# Patient Record
Sex: FEMALE | Race: WHITE | NOT HISPANIC OR LATINO | Employment: OTHER | URBAN - METROPOLITAN AREA
[De-identification: names, ages, dates, MRNs, and addresses within clinical notes are randomized per-mention and may not be internally consistent; named-entity substitution may affect disease eponyms.]

---

## 2017-01-27 ENCOUNTER — RESULTS ENCOUNTER (OUTPATIENT)
Dept: INTERNAL MEDICINE | Facility: CLINIC | Age: 62
End: 2017-01-27

## 2017-01-27 DIAGNOSIS — E78.5 HYPERLIPIDEMIA: ICD-10-CM

## 2017-01-27 DIAGNOSIS — E55.9 VITAMIN D DEFICIENCY: ICD-10-CM

## 2017-01-30 ENCOUNTER — LAB (OUTPATIENT)
Dept: INTERNAL MEDICINE | Facility: CLINIC | Age: 62
End: 2017-01-30

## 2017-01-30 ENCOUNTER — RESULTS ENCOUNTER (OUTPATIENT)
Dept: INTERNAL MEDICINE | Facility: CLINIC | Age: 62
End: 2017-01-30

## 2017-01-30 DIAGNOSIS — E78.5 HYPERLIPIDEMIA, UNSPECIFIED HYPERLIPIDEMIA TYPE: Primary | ICD-10-CM

## 2017-01-30 DIAGNOSIS — E55.9 VITAMIN D DEFICIENCY: ICD-10-CM

## 2017-01-30 LAB
25(OH)D3 SERPL-MCNC: 29.5 NG/ML (ref 30–100)
ALBUMIN SERPL-MCNC: 3.86 G/DL (ref 3.4–4.6)
ALBUMIN/GLOB SERPL: 1.2 G/DL
ALP SERPL-CCNC: 62 U/L (ref 46–116)
ALT SERPL W P-5'-P-CCNC: 18 U/L (ref 14–59)
ANION GAP SERPL CALCULATED.3IONS-SCNC: 9 MMOL/L
AST SERPL-CCNC: 17 U/L (ref 7–37)
BILIRUB SERPL-MCNC: 0.5 MG/DL (ref 0.2–1)
BUN BLD-MCNC: 14 MG/DL (ref 6–22)
BUN/CREAT SERPL: 16.5 (ref 7–25)
CALCIUM SPEC-SCNC: 8.8 MG/DL (ref 8.6–10.5)
CHLORIDE SERPL-SCNC: 104 MMOL/L (ref 95–107)
CHOLEST SERPL-MCNC: 223 MG/DL (ref 0–200)
CO2 SERPL-SCNC: 27 MMOL/L (ref 23–32)
CREAT BLD-MCNC: 0.85 MG/DL (ref 0.55–1.02)
GFR SERPL CREATININE-BSD FRML MDRD: 68 ML/MIN/1.73
GLOBULIN UR ELPH-MCNC: 3.3 GM/DL
GLUCOSE BLD-MCNC: 98 MG/DL (ref 70–100)
HDLC SERPL-MCNC: 53 MG/DL (ref 40–81)
LDLC SERPL CALC-MCNC: 145 MG/DL (ref 0–100)
LDLC/HDLC SERPL: 2.74 {RATIO}
POTASSIUM BLD-SCNC: 4 MMOL/L (ref 3.3–5.3)
PROT SERPL-MCNC: 7.2 G/DL (ref 6.3–8.4)
SODIUM BLD-SCNC: 140 MMOL/L (ref 136–145)
TRIGL SERPL-MCNC: 124 MG/DL (ref 0–150)
VLDLC SERPL-MCNC: 24.8 MG/DL

## 2017-01-30 PROCEDURE — 80061 LIPID PANEL: CPT | Performed by: INTERNAL MEDICINE

## 2017-01-30 PROCEDURE — 80053 COMPREHEN METABOLIC PANEL: CPT | Performed by: INTERNAL MEDICINE

## 2017-02-01 ENCOUNTER — OFFICE VISIT (OUTPATIENT)
Dept: INTERNAL MEDICINE | Facility: CLINIC | Age: 62
End: 2017-02-01

## 2017-02-01 VITALS
DIASTOLIC BLOOD PRESSURE: 60 MMHG | BODY MASS INDEX: 25.61 KG/M2 | HEIGHT: 64 IN | WEIGHT: 150 LBS | SYSTOLIC BLOOD PRESSURE: 104 MMHG

## 2017-02-01 DIAGNOSIS — E78.2 MIXED HYPERLIPIDEMIA: Primary | ICD-10-CM

## 2017-02-01 DIAGNOSIS — E55.9 VITAMIN D DEFICIENCY: ICD-10-CM

## 2017-02-01 DIAGNOSIS — Z00.00 HEALTH CARE MAINTENANCE: ICD-10-CM

## 2017-02-01 PROCEDURE — 99213 OFFICE O/P EST LOW 20 MIN: CPT | Performed by: INTERNAL MEDICINE

## 2017-02-01 NOTE — PROGRESS NOTES
"Subjective   Chris Moreno is a 61 y.o. female. Here for hyperlipidemia and Vitamin D deficiency f/u.    History of Present Illness     Visit Vitals   • /60   • Ht 64\" (162.6 cm)   • Wt 150 lb (68 kg)   • BMI 25.75 kg/m2       Current Outpatient Prescriptions:   •  Cholecalciferol (VITAMIN D) 2000 UNITS capsule, Take 2,000 Units by mouth daily., Disp: , Rfl:   •  fluticasone (FLONASE) 50 MCG/ACT nasal spray, 2 sprays into each nostril daily., Disp: 16 g, Rfl: 6  •  ketoconazole (NIZORAL) 2 % cream, Apply topically daily., Disp: , Rfl:   •  Loratadine (CLARITIN PO), Take  by mouth Daily., Disp: , Rfl:   •  Multiple Vitamins-Minerals (CENTRUM SILVER) tablet, Take 1 tablet by mouth daily., Disp: , Rfl:   •  olopatadine (PATADAY) 0.2 % solution ophthalmic solution, Apply to eye as needed., Disp: , Rfl:   •  raloxifene (EVISTA) 60 MG tablet, TAKE 1 TABLET DAILY, Disp: 90 tablet, Rfl: 1  •  rosuvastatin (CRESTOR) 20 MG tablet, Take 1 tablet by mouth daily., Disp: 90 tablet, Rfl: 3    Patient has been diagnosed with hyperlipidemia. Target LDL is < 100 mg/dl (CHD or \"CHD risk equivalent\" is present). Patient is on low fat diet with good compliance. Patient is compliant with treatment: daily use of Crestor (rozuvastatin). Side effects of medication: none. Exercise 3-4 days a week, walking and aerobic exercise.    Patient had been diagnosed with Vitamin D deficiency. Takes over the counter Vit D3 2000 IU a day. Patient admits that her compliance is not good, on average she takes supplement 2 days a week only.Patient does not have complaints of muscle or bone aches. Balance is good. No recent falls.     The following portions of the patient's history were reviewed and updated as appropriate: allergies, current medications, past family history, past medical history, past social history, past surgical history and problem list.    Review of Systems   Constitutional: Negative for chills and fever.   Eyes: Negative for pain " "and redness.   Respiratory: Negative for cough and shortness of breath.    Cardiovascular: Negative for chest pain and leg swelling.   Neurological: Negative for dizziness and headaches.       Objective   Physical Exam   Constitutional: She is oriented to person, place, and time. She appears well-developed and well-nourished.   HENT:   Head: Normocephalic and atraumatic.   Right Ear: Tympanic membrane, external ear and ear canal normal.   Left Ear: Tympanic membrane, external ear and ear canal normal.   Nose: Nose normal. Right sinus exhibits no maxillary sinus tenderness and no frontal sinus tenderness. Left sinus exhibits no maxillary sinus tenderness and no frontal sinus tenderness.   Mouth/Throat: Uvula is midline, oropharynx is clear and moist and mucous membranes are normal.   Eyes: Conjunctivae and EOM are normal. Pupils are equal, round, and reactive to light. Right eye exhibits no discharge. Left eye exhibits no discharge. No scleral icterus.   Neck: Neck supple. No JVD present.   Cardiovascular: Normal rate, regular rhythm and normal heart sounds.  Exam reveals no gallop and no friction rub.    No murmur heard.  Pulmonary/Chest: Effort normal and breath sounds normal. She has no wheezes. She has no rales.   Musculoskeletal: She exhibits no edema.   Lymphadenopathy:     She has no cervical adenopathy.   Neurological: She is alert and oriented to person, place, and time. No cranial nerve deficit.   Skin: Skin is warm and dry. No rash noted.   Psychiatric: She has a normal mood and affect. Her behavior is normal.   Vitals reviewed.      Assessment/Plan   Problems Addressed this Visit     Hyperlipidemia - Primary    Vitamin D deficiency      1. Hyperlipidemia - control had been a bit worse since the hol;iadays. Needs to improve the diet and continue medication and exercise. Hopefully her \"invisilines\" will help to reduce snacking.  2. Vit D deficiency - poor compliance with medication and the level is on a low " side: asked patient to take Vit D at the same time a Crestor, but daily.

## 2017-04-06 RX ORDER — RALOXIFENE HYDROCHLORIDE 60 MG/1
TABLET, FILM COATED ORAL
Qty: 90 TABLET | Refills: 0 | Status: SHIPPED | OUTPATIENT
Start: 2017-04-06 | End: 2017-07-05 | Stop reason: SDUPTHER

## 2017-05-01 ENCOUNTER — OFFICE VISIT (OUTPATIENT)
Dept: OBSTETRICS AND GYNECOLOGY | Facility: CLINIC | Age: 62
End: 2017-05-01

## 2017-05-01 VITALS
HEIGHT: 64 IN | SYSTOLIC BLOOD PRESSURE: 110 MMHG | WEIGHT: 153 LBS | BODY MASS INDEX: 26.12 KG/M2 | DIASTOLIC BLOOD PRESSURE: 70 MMHG

## 2017-05-01 DIAGNOSIS — Z85.3 HX: BREAST CANCER: ICD-10-CM

## 2017-05-01 DIAGNOSIS — M85.80 OSTEOPENIA: ICD-10-CM

## 2017-05-01 DIAGNOSIS — Z01.419 ENCOUNTER FOR GYNECOLOGICAL EXAMINATION WITHOUT ABNORMAL FINDING: Primary | ICD-10-CM

## 2017-05-01 PROCEDURE — 99396 PREV VISIT EST AGE 40-64: CPT | Performed by: OBSTETRICS & GYNECOLOGY

## 2017-05-03 LAB
CONV .: NORMAL
CYTOLOGIST CVX/VAG CYTO: NORMAL
CYTOLOGY CVX/VAG DOC THIN PREP: NORMAL
DX ICD CODE: NORMAL
HIV 1 & 2 AB SER-IMP: NORMAL
OTHER STN SPEC: NORMAL
PATH REPORT.FINAL DX SPEC: NORMAL
STAT OF ADQ CVX/VAG CYTO-IMP: NORMAL

## 2017-07-05 DIAGNOSIS — Z12.31 SCREENING MAMMOGRAM, ENCOUNTER FOR: ICD-10-CM

## 2017-07-05 RX ORDER — RALOXIFENE HYDROCHLORIDE 60 MG/1
TABLET, FILM COATED ORAL
Qty: 90 TABLET | Refills: 1 | Status: SHIPPED | OUTPATIENT
Start: 2017-07-05 | End: 2017-08-16 | Stop reason: SDUPTHER

## 2017-08-08 ENCOUNTER — LAB (OUTPATIENT)
Dept: LAB | Facility: HOSPITAL | Age: 62
End: 2017-08-08

## 2017-08-08 DIAGNOSIS — E55.9 VITAMIN D DEFICIENCY: ICD-10-CM

## 2017-08-08 DIAGNOSIS — Z00.00 HEALTH CARE MAINTENANCE: ICD-10-CM

## 2017-08-08 DIAGNOSIS — Z00.00 HEALTH MAINTENANCE EXAMINATION: Primary | ICD-10-CM

## 2017-08-08 LAB
25(OH)D3 SERPL-MCNC: 33.6 NG/ML (ref 30–100)
ALBUMIN SERPL-MCNC: 4.2 G/DL (ref 3.5–5.2)
ALBUMIN/GLOB SERPL: 1.3 G/DL
ALP SERPL-CCNC: 58 U/L (ref 39–117)
ALT SERPL W P-5'-P-CCNC: 16 U/L (ref 1–33)
ANION GAP SERPL CALCULATED.3IONS-SCNC: 15.2 MMOL/L
AST SERPL-CCNC: 18 U/L (ref 1–32)
BACTERIA UR QL AUTO: NORMAL /HPF
BASOPHILS # BLD AUTO: 0.04 10*3/MM3 (ref 0–0.2)
BASOPHILS NFR BLD AUTO: 0.7 % (ref 0–1.5)
BILIRUB SERPL-MCNC: 0.6 MG/DL (ref 0.1–1.2)
BILIRUB UR QL STRIP: NEGATIVE
BUN BLD-MCNC: 16 MG/DL (ref 8–23)
BUN/CREAT SERPL: 17.2 (ref 7–25)
CALCIUM SPEC-SCNC: 9.6 MG/DL (ref 8.6–10.5)
CHLORIDE SERPL-SCNC: 104 MMOL/L (ref 98–107)
CHOLEST SERPL-MCNC: 235 MG/DL (ref 0–200)
CLARITY UR: CLEAR
CO2 SERPL-SCNC: 21.8 MMOL/L (ref 22–29)
COLOR UR: YELLOW
CREAT BLD-MCNC: 0.93 MG/DL (ref 0.57–1)
DEPRECATED RDW RBC AUTO: 43.3 FL (ref 37–54)
EOSINOPHIL # BLD AUTO: 0.15 10*3/MM3 (ref 0–0.7)
EOSINOPHIL NFR BLD AUTO: 2.5 % (ref 0.3–6.2)
ERYTHROCYTE [DISTWIDTH] IN BLOOD BY AUTOMATED COUNT: 12.9 % (ref 11.7–13)
GFR SERPL CREATININE-BSD FRML MDRD: 61 ML/MIN/1.73
GLOBULIN UR ELPH-MCNC: 3.3 GM/DL
GLUCOSE BLD-MCNC: 98 MG/DL (ref 65–99)
GLUCOSE UR STRIP-MCNC: NEGATIVE MG/DL
HCT VFR BLD AUTO: 40 % (ref 35.6–45.5)
HDLC SERPL-MCNC: 48 MG/DL (ref 40–60)
HGB BLD-MCNC: 13.5 G/DL (ref 11.9–15.5)
HGB UR QL STRIP.AUTO: NEGATIVE
HYALINE CASTS UR QL AUTO: NORMAL /LPF
IMM GRANULOCYTES # BLD: 0 10*3/MM3 (ref 0–0.03)
IMM GRANULOCYTES NFR BLD: 0 % (ref 0–0.5)
KETONES UR QL STRIP: NEGATIVE
LDLC SERPL CALC-MCNC: 150 MG/DL (ref 0–100)
LDLC/HDLC SERPL: 3.12 {RATIO}
LEUKOCYTE ESTERASE UR QL STRIP.AUTO: NEGATIVE
LYMPHOCYTES # BLD AUTO: 2.6 10*3/MM3 (ref 0.9–4.8)
LYMPHOCYTES NFR BLD AUTO: 44 % (ref 19.6–45.3)
MCH RBC QN AUTO: 31.5 PG (ref 26.9–32)
MCHC RBC AUTO-ENTMCNC: 33.8 G/DL (ref 32.4–36.3)
MCV RBC AUTO: 93.5 FL (ref 80.5–98.2)
MONOCYTES # BLD AUTO: 0.35 10*3/MM3 (ref 0.2–1.2)
MONOCYTES NFR BLD AUTO: 5.9 % (ref 5–12)
NEUTROPHILS # BLD AUTO: 2.77 10*3/MM3 (ref 1.9–8.1)
NEUTROPHILS NFR BLD AUTO: 46.9 % (ref 42.7–76)
NITRITE UR QL STRIP: NEGATIVE
PH UR STRIP.AUTO: <=5 [PH] (ref 5–8)
PLATELET # BLD AUTO: 231 10*3/MM3 (ref 140–500)
PMV BLD AUTO: 11.6 FL (ref 6–12)
POTASSIUM BLD-SCNC: 4.1 MMOL/L (ref 3.5–5.2)
PROT SERPL-MCNC: 7.5 G/DL (ref 6–8.5)
PROT UR QL STRIP: NEGATIVE
RBC # BLD AUTO: 4.28 10*6/MM3 (ref 3.9–5.2)
RBC # UR: NORMAL /HPF
REF LAB TEST METHOD: NORMAL
SODIUM BLD-SCNC: 141 MMOL/L (ref 136–145)
SP GR UR STRIP: 1.02 (ref 1–1.03)
SQUAMOUS #/AREA URNS HPF: NORMAL /HPF
TRIGL SERPL-MCNC: 186 MG/DL (ref 0–150)
TSH SERPL DL<=0.05 MIU/L-ACNC: 3.02 MIU/ML (ref 0.27–4.2)
UROBILINOGEN UR QL STRIP: NORMAL
VLDLC SERPL-MCNC: 37.2 MG/DL (ref 5–40)
WBC NRBC COR # BLD: 5.91 10*3/MM3 (ref 4.5–10.7)
WBC UR QL AUTO: NORMAL /HPF

## 2017-08-08 PROCEDURE — 82306 VITAMIN D 25 HYDROXY: CPT

## 2017-08-08 PROCEDURE — 81001 URINALYSIS AUTO W/SCOPE: CPT

## 2017-08-08 PROCEDURE — 80053 COMPREHEN METABOLIC PANEL: CPT

## 2017-08-08 PROCEDURE — 36415 COLL VENOUS BLD VENIPUNCTURE: CPT

## 2017-08-08 PROCEDURE — 84443 ASSAY THYROID STIM HORMONE: CPT

## 2017-08-08 PROCEDURE — 80061 LIPID PANEL: CPT

## 2017-08-08 PROCEDURE — 85025 COMPLETE CBC W/AUTO DIFF WBC: CPT

## 2017-08-16 ENCOUNTER — OFFICE VISIT (OUTPATIENT)
Dept: INTERNAL MEDICINE | Facility: CLINIC | Age: 62
End: 2017-08-16

## 2017-08-16 VITALS
WEIGHT: 153 LBS | HEIGHT: 64 IN | BODY MASS INDEX: 26.12 KG/M2 | DIASTOLIC BLOOD PRESSURE: 62 MMHG | SYSTOLIC BLOOD PRESSURE: 104 MMHG

## 2017-08-16 DIAGNOSIS — E55.9 VITAMIN D DEFICIENCY: ICD-10-CM

## 2017-08-16 DIAGNOSIS — Z78.0 POST-MENOPAUSE: ICD-10-CM

## 2017-08-16 DIAGNOSIS — E78.2 MIXED HYPERLIPIDEMIA: ICD-10-CM

## 2017-08-16 DIAGNOSIS — M85.80 OSTEOPENIA: ICD-10-CM

## 2017-08-16 DIAGNOSIS — J30.9 ATOPIC RHINITIS: ICD-10-CM

## 2017-08-16 DIAGNOSIS — Z00.00 HEALTH CARE MAINTENANCE: Primary | ICD-10-CM

## 2017-08-16 PROCEDURE — 99396 PREV VISIT EST AGE 40-64: CPT | Performed by: INTERNAL MEDICINE

## 2017-08-16 RX ORDER — OLOPATADINE HYDROCHLORIDE 2 MG/ML
1 SOLUTION/ DROPS OPHTHALMIC DAILY
Qty: 2.5 ML | Refills: 11 | Status: SHIPPED | OUTPATIENT
Start: 2017-08-16 | End: 2018-11-02 | Stop reason: SDUPTHER

## 2017-08-16 RX ORDER — FLUTICASONE PROPIONATE 50 MCG
2 SPRAY, SUSPENSION (ML) NASAL DAILY
Qty: 16 G | Refills: 11 | Status: SHIPPED | OUTPATIENT
Start: 2017-08-16 | End: 2018-11-02 | Stop reason: SDUPTHER

## 2017-08-16 RX ORDER — RALOXIFENE HYDROCHLORIDE 60 MG/1
60 TABLET, FILM COATED ORAL DAILY
Qty: 90 TABLET | Refills: 3 | Status: SHIPPED | OUTPATIENT
Start: 2017-08-16 | End: 2018-08-24 | Stop reason: SDUPTHER

## 2017-08-16 RX ORDER — ROSUVASTATIN CALCIUM 20 MG/1
20 TABLET, COATED ORAL DAILY
Qty: 90 TABLET | Refills: 3 | Status: SHIPPED | OUTPATIENT
Start: 2017-08-16 | End: 2018-08-20 | Stop reason: SDUPTHER

## 2017-08-16 NOTE — PROGRESS NOTES
"Ish Moreno is a 61 y.o. female.     History of Present Illness   /62  Ht 64\" (162.6 cm)  Wt 153 lb (69.4 kg)  BMI 26.26 kg/m2  Patient is here for yearly CPE. Last CPE was  1 year ago.  PMH, SH and FH reviewed. Changes in the FH: none .  Patient rates her own health as: good.  Tobacco use: none.  Alcohol use: seldom.  Recreational drugs use: none  Medication list rewieved.  Diet: healthy heart.  Exercise: 2 times a week and aerobic exercise.  Marital status: .   Employment: retired.  Patient rates her stress level as: low.  Dental health: good. Brushes teeth 7 times a day, flosses 1 time a day . Dental visits: 2 times a year .  Vision correction: not needed  Hearing: normal.    Recent vaccinations:   Flu discussed and recommended, but patient declines  Tdap  is up to date  Zostavax completed    Patient is postmenopausal. Past pregnancies:none. Currently patient is not a candidate for HRT.      Current Outpatient Prescriptions:   •  Cholecalciferol (VITAMIN D) 2000 UNITS capsule, Take 2,000 Units by mouth daily., Disp: , Rfl:   •  fluticasone (FLONASE) 50 MCG/ACT nasal spray, 2 sprays into each nostril daily., Disp: 16 g, Rfl: 6  •  Loratadine (CLARITIN PO), Take  by mouth Daily., Disp: , Rfl:   •  Multiple Vitamins-Minerals (CENTRUM SILVER) tablet, Take 1 tablet by mouth daily., Disp: , Rfl:   •  olopatadine (PATADAY) 0.2 % solution ophthalmic solution, Apply to eye as needed., Disp: , Rfl:   •  raloxifene (EVISTA) 60 MG tablet, TAKE 1 TABLET DAILY, Disp: 90 tablet, Rfl: 1  •  rosuvastatin (CRESTOR) 20 MG tablet, Take 1 tablet by mouth daily., Disp: 90 tablet, Rfl: 3          The following portions of the patient's history were reviewed and updated as appropriate: allergies, current medications, past family history, past medical history, past social history, past surgical history and problem list.    Review of Systems   Constitutional: Negative for chills and fever.   HENT: Negative " for postnasal drip, sinus pressure and sore throat.    Eyes: Negative for pain and itching. Eye discharge: watery eyes from allergies.   Respiratory: Negative for cough and chest tightness.    Cardiovascular: Negative for chest pain and leg swelling.   Gastrointestinal: Negative for abdominal pain and blood in stool.   Endocrine: Negative for cold intolerance and heat intolerance.   Genitourinary: Negative for difficulty urinating and flank pain.   Musculoskeletal: Negative for back pain and neck pain.   Skin: Negative for color change and rash.   Neurological: Negative for dizziness, weakness and headaches.   Hematological: Negative for adenopathy. Does not bruise/bleed easily.   Psychiatric/Behavioral: Negative for sleep disturbance. The patient is not nervous/anxious.        Objective   Physical Exam   Constitutional: She is oriented to person, place, and time. She appears well-developed and well-nourished. No distress.   HENT:   Head: Normocephalic and atraumatic.   Right Ear: External ear normal.   Left Ear: External ear normal.   Nose: Nose normal.   Mouth/Throat: Oropharynx is clear and moist. No oropharyngeal exudate.   Eyes: Conjunctivae and EOM are normal. Pupils are equal, round, and reactive to light. Right eye exhibits no discharge. Left eye exhibits no discharge. No scleral icterus.   Neck: Normal range of motion. Neck supple. No JVD present. No thyromegaly present.   Cardiovascular: Normal rate, regular rhythm, S1 normal, S2 normal, normal heart sounds and intact distal pulses.  Exam reveals no gallop and no friction rub.    No murmur heard.  Pulmonary/Chest: Effort normal and breath sounds normal. No respiratory distress. She has no decreased breath sounds. She has no wheezes. She has no rhonchi. She has no rales. Right breast exhibits no inverted nipple, no mass, no nipple discharge, no skin change and no tenderness. Left breast exhibits no inverted nipple, no mass, no nipple discharge, no skin  change and no tenderness. Breasts are asymmetrical (s/p R mastectomy with reconstruction).   Abdominal: Soft. Bowel sounds are normal. She exhibits no distension and no mass. There is no tenderness. There is no rebound and no guarding.   Musculoskeletal: She exhibits no edema.   Lymphadenopathy:        Head (right side): No submental, no submandibular, no preauricular, no posterior auricular and no occipital adenopathy present.        Head (left side): No submental, no submandibular, no preauricular, no posterior auricular and no occipital adenopathy present.     She has no cervical adenopathy.        Right cervical: No superficial cervical, no deep cervical and no posterior cervical adenopathy present.       Left cervical: No superficial cervical, no deep cervical and no posterior cervical adenopathy present.     She has no axillary adenopathy.        Right: No supraclavicular adenopathy present.        Left: No supraclavicular adenopathy present.   Neurological: She is alert and oriented to person, place, and time. She has normal reflexes. She displays normal reflexes. No cranial nerve deficit. She exhibits normal muscle tone. Coordination normal.   Reflex Scores:       Bicep reflexes are 2+ on the right side and 2+ on the left side.       Patellar reflexes are 2+ on the right side and 2+ on the left side.  Skin: Skin is warm. No rash noted. She is not diaphoretic. No erythema.   numerous SK and cherry hemangiomas, few benign appearing papillomas   Psychiatric: She has a normal mood and affect. Her behavior is normal. Thought content normal.   Vitals reviewed.      Assessment/Plan   Chris was seen today for annual exam.    Diagnoses and all orders for this visit:    Health care maintenance    Post-menopause  -     DEXA Bone Density Axial; Future    Mixed hyperlipidemia  -     Comprehensive Metabolic Panel; Future  -     Lipid Panel; Future    Vitamin D deficiency  -     Vitamin D 25 Hydroxy; Future    Osteopenia  -      raloxifene (EVISTA) 60 MG tablet; Take 1 tablet by mouth Daily.    Atopic rhinitis  -     fluticasone (FLONASE) 50 MCG/ACT nasal spray; 2 sprays into each nostril Daily.  -     olopatadine (PATADAY) 0.2 % solution ophthalmic solution; Administer 1 drop to both eyes Daily.    Other orders  -     rosuvastatin (CRESTOR) 20 MG tablet; Take 1 tablet by mouth Daily.      Risk evaluation:  1. Cardiovascular risk factors: hyperlipidemia, family history of CAD   2. Diabetes risk factors: FH of diabetes and sedentary life style.  3. Cancer risk factors: nulliparity and skin type associated with high risk of skin cancers.  4. Risky behavior: none. Use of seat belts: regular. Use of sunscreens: regular. SPF 30+.   Tattoos: none. H/o blood transfusions/organ transplants before 1992 or clotting factor transfusion before 1987: none.     Prevention:  Cholesterol test  up to date.  Blood sugar test up to date.   Hep C testing (for patients born [id number]): completed..  Mammogram up to date. Recommended every year.. Breast self exams recommended once a month.  Colonoscopy: up to date. Recommended every 10 years. Next screening is due in 2026..  PAP smear : up to date. Recommendations per GYN..  DEXA : is due, will schedule. Benefits explained..

## 2017-08-16 NOTE — PATIENT INSTRUCTIONS
Risk evaluation:  1. Cardiovascular risk factors: hyperlipidemia, family history of CAD   2. Diabetes risk factors: FH of diabetes and sedentary life style.  3. Cancer risk factors: nulliparity and skin type associated with high risk of skin cancers.  4. Risky behavior: none. Use of seat belts: regular. Use of sunscreens: regular. SPF 30+.   Tattoos: none. H/o blood transfusions/organ transplants before 1992 or clotting factor transfusion before 1987: none.     Prevention:  Cholesterol test  up to date.  Blood sugar test up to date.   Hep C testing (for patients born [id number]): completed..  Mammogram up to date. Recommended every year.. Breast self exams recommended once a month.  Colonoscopy: up to date. Recommended every 10 years. Next screening is due in 2026..  PAP smear : up to date. Recommendations per GYN..  DEXA : is due, will schedule. Benefits explained..

## 2017-09-06 ENCOUNTER — CLINICAL SUPPORT (OUTPATIENT)
Dept: INTERNAL MEDICINE | Facility: CLINIC | Age: 62
End: 2017-09-06

## 2017-09-06 DIAGNOSIS — Z78.0 POST-MENOPAUSE: ICD-10-CM

## 2017-09-06 PROCEDURE — 77080 DXA BONE DENSITY AXIAL: CPT | Performed by: INTERNAL MEDICINE

## 2017-10-17 ENCOUNTER — LAB (OUTPATIENT)
Dept: LAB | Facility: HOSPITAL | Age: 62
End: 2017-10-17

## 2017-10-17 ENCOUNTER — OFFICE VISIT (OUTPATIENT)
Dept: ONCOLOGY | Facility: CLINIC | Age: 62
End: 2017-10-17

## 2017-10-17 VITALS
HEART RATE: 73 BPM | SYSTOLIC BLOOD PRESSURE: 116 MMHG | TEMPERATURE: 98.6 F | OXYGEN SATURATION: 98 % | WEIGHT: 154.4 LBS | DIASTOLIC BLOOD PRESSURE: 86 MMHG | BODY MASS INDEX: 26.36 KG/M2 | RESPIRATION RATE: 12 BRPM | HEIGHT: 64 IN

## 2017-10-17 DIAGNOSIS — M85.80 OSTEOPENIA, UNSPECIFIED LOCATION: ICD-10-CM

## 2017-10-17 DIAGNOSIS — Z85.3 HISTORY OF BREAST CANCER: Primary | ICD-10-CM

## 2017-10-17 DIAGNOSIS — Z85.820 HISTORY OF MELANOMA: ICD-10-CM

## 2017-10-17 DIAGNOSIS — E78.49 OTHER HYPERLIPIDEMIA: Primary | ICD-10-CM

## 2017-10-17 LAB
BASOPHILS # BLD AUTO: 0.05 10*3/MM3 (ref 0–0.1)
BASOPHILS NFR BLD AUTO: 0.8 % (ref 0–1.1)
DEPRECATED RDW RBC AUTO: 41.1 FL (ref 37–49)
EOSINOPHIL # BLD AUTO: 0.19 10*3/MM3 (ref 0–0.36)
EOSINOPHIL NFR BLD AUTO: 3.1 % (ref 1–5)
ERYTHROCYTE [DISTWIDTH] IN BLOOD BY AUTOMATED COUNT: 12.6 % (ref 11.7–14.5)
HCT VFR BLD AUTO: 38.4 % (ref 34–45)
HGB BLD-MCNC: 13.5 G/DL (ref 11.5–14.9)
IMM GRANULOCYTES # BLD: 0.03 10*3/MM3 (ref 0–0.03)
IMM GRANULOCYTES NFR BLD: 0.5 % (ref 0–0.5)
LYMPHOCYTES # BLD AUTO: 2.53 10*3/MM3 (ref 1–3.5)
LYMPHOCYTES NFR BLD AUTO: 41.8 % (ref 20–49)
MCH RBC QN AUTO: 31.4 PG (ref 27–33)
MCHC RBC AUTO-ENTMCNC: 35.2 G/DL (ref 32–35)
MCV RBC AUTO: 89.3 FL (ref 83–97)
MONOCYTES # BLD AUTO: 0.43 10*3/MM3 (ref 0.25–0.8)
MONOCYTES NFR BLD AUTO: 7.1 % (ref 4–12)
NEUTROPHILS # BLD AUTO: 2.82 10*3/MM3 (ref 1.5–7)
NEUTROPHILS NFR BLD AUTO: 46.7 % (ref 39–75)
NRBC BLD MANUAL-RTO: 0 /100 WBC (ref 0–0)
PLATELET # BLD AUTO: 235 10*3/MM3 (ref 150–375)
PMV BLD AUTO: 11 FL (ref 8.9–12.1)
RBC # BLD AUTO: 4.3 10*6/MM3 (ref 3.9–5)
WBC NRBC COR # BLD: 6.05 10*3/MM3 (ref 4–10)

## 2017-10-17 PROCEDURE — 99213 OFFICE O/P EST LOW 20 MIN: CPT | Performed by: INTERNAL MEDICINE

## 2017-10-17 PROCEDURE — 36415 COLL VENOUS BLD VENIPUNCTURE: CPT | Performed by: INTERNAL MEDICINE

## 2017-10-17 PROCEDURE — 85025 COMPLETE CBC W/AUTO DIFF WBC: CPT | Performed by: INTERNAL MEDICINE

## 2017-10-17 NOTE — PROGRESS NOTES
Subjective   REASONS FOR FOLLOWUP:   1. 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   2. Negative genetic testing for BRCA1 and BRCA2 in July 2006.   3. Osteopenia  HISTORY OF PRESENT ILLNESS:   The patient is a 62 y.o. female with the above-noted history of DCIS and microinvasion in the right breast in 2006. She underwent mastectomy and TRAM flap reconstruction. She was started on Evista as a breast cancer prevention and has elected to remain on Evista after completing 5 years of therapy due to the fact that she also has significant osteopenia.       She is up-to-date on her mammograms and bone density scans.  She has not had any other major health issues this year and looks great in the office today.  She also continues to follow-up with her dermatologist annually for a skin exam due to her past history of melanoma excised from the left lower leg.  History of Present Illness      Past Medical History, Past Surgical History, Social History, Family History have been reviewed and are without significant changes except as mentioned.    Review of Systems   Constitutional: Negative for activity change, appetite change, fatigue, fever and unexpected weight change.   HENT: Negative for hearing loss, nosebleeds, trouble swallowing and voice change.    Eyes: Negative for visual disturbance.   Respiratory: Negative for cough, shortness of breath and wheezing.    Cardiovascular: Negative for chest pain and palpitations.   Gastrointestinal: Negative for abdominal pain, diarrhea, nausea and vomiting.   Genitourinary: Negative for difficulty urinating, frequency, hematuria and urgency.   Musculoskeletal: Negative for back pain and neck pain.   Skin: Negative for rash.   Neurological: Negative for dizziness, seizures, syncope and headaches.   Hematological: Negative  "for adenopathy. Does not bruise/bleed easily.   Psychiatric/Behavioral: Negative for behavioral problems. The patient is not nervous/anxious.       A comprehensive 14 point review of systems was performed and was negative except as mentioned.    Medications:  The current medication list was reviewed in the EMR    ALLERGIES:  No Known Allergies    Objective      Vitals:    10/17/17 1047   BP: 116/86   Pulse: 73   Resp: 12   Temp: 98.6 °F (37 °C)   TempSrc: Oral   SpO2: 98%   Weight: 154 lb 6.4 oz (70 kg)   Height: 64.02\" (162.6 cm)  Comment: new height with out shoes on   PainSc: 0-No pain     Current Status 10/17/2017   ECOG score 0       Physical Exam   Constitutional: She is oriented to person, place, and time. She appears well-developed and well-nourished. No distress.   HENT:   Head: Normocephalic.   Eyes: EOM are normal. Pupils are equal, round, and reactive to light.   Neck: Neck supple. No JVD present. No thyromegaly present.   Cardiovascular: Normal rate and regular rhythm.  Exam reveals no gallop and no friction rub.    No murmur heard.  Pulmonary/Chest: Effort normal and breath sounds normal. She has no wheezes. She has no rales. Left breast exhibits no mass, no nipple discharge and no skin change.   Right TRAM reconstruction.   Abdominal: Soft. Bowel sounds are normal. She exhibits no distension and no mass. There is no tenderness.   Musculoskeletal: She exhibits no edema or deformity.   Neurological: She is alert and oriented to person, place, and time. She has normal reflexes. No cranial nerve deficit.   Skin: Skin is dry. No rash noted.   Psychiatric: She has a normal mood and affect. Judgment normal.        RECENT LABS:  Hematology WBC   Date Value Ref Range Status   10/17/2017 6.05 4.00 - 10.00 10*3/mm3 Final     RBC   Date Value Ref Range Status   10/17/2017 4.30 3.90 - 5.00 10*6/mm3 Final     Hemoglobin   Date Value Ref Range Status   10/17/2017 13.5 11.5 - 14.9 g/dL Final     Hematocrit   Date " Value Ref Range Status   10/17/2017 38.4 34.0 - 45.0 % Final     Platelets   Date Value Ref Range Status   10/17/2017 235 150 - 375 10*3/mm3 Final       Mammogram 6/16/2017   OK           DEXA 8/16/2017     Osteopenia    Assessment/Plan   1.  DCIS with microinvasion of the right breast status post mastectomy and TRAM flap reconstruction.  She also received therapy with Evista as breast cancer prevention strategy.  After 5 years of Evista she elected to remain on the medication due to her osteopenia.  2.  Melanoma excised from the leg.  She continues regular follow-up with her dermatologist.    Plan  1.  Return in 12 months for routine follow-up.  2.  Her next mammogram  will be due in June 2018.  3.  Next DEXA scan will be due in August 2019.                  10/17/2017      CC:

## 2018-02-07 ENCOUNTER — LAB (OUTPATIENT)
Dept: INTERNAL MEDICINE | Facility: CLINIC | Age: 63
End: 2018-02-07

## 2018-02-07 DIAGNOSIS — E78.2 MIXED HYPERLIPIDEMIA: ICD-10-CM

## 2018-02-07 DIAGNOSIS — E55.9 VITAMIN D DEFICIENCY: ICD-10-CM

## 2018-02-07 LAB
25(OH)D3 SERPL-MCNC: 57.2 NG/ML (ref 30–100)
ALBUMIN SERPL-MCNC: 4 G/DL (ref 3.5–5.2)
ALBUMIN/GLOB SERPL: 1.4 G/DL
ALP SERPL-CCNC: 60 U/L (ref 39–117)
ALT SERPL W P-5'-P-CCNC: 15 U/L (ref 1–33)
ANION GAP SERPL CALCULATED.3IONS-SCNC: 13.9 MMOL/L
AST SERPL-CCNC: 14 U/L (ref 1–32)
BILIRUB SERPL-MCNC: 0.5 MG/DL (ref 0.1–1.2)
BUN BLD-MCNC: 17 MG/DL (ref 8–23)
BUN/CREAT SERPL: 23.6 (ref 7–25)
CALCIUM SPEC-SCNC: 9 MG/DL (ref 8.6–10.5)
CHLORIDE SERPL-SCNC: 104 MMOL/L (ref 98–107)
CHOLEST SERPL-MCNC: 217 MG/DL (ref 0–200)
CO2 SERPL-SCNC: 23.1 MMOL/L (ref 22–29)
CREAT BLD-MCNC: 0.72 MG/DL (ref 0.57–1)
GFR SERPL CREATININE-BSD FRML MDRD: 82 ML/MIN/1.73
GLOBULIN UR ELPH-MCNC: 2.8 GM/DL
GLUCOSE BLD-MCNC: 99 MG/DL (ref 65–99)
HDLC SERPL-MCNC: 45 MG/DL (ref 40–60)
LDLC SERPL CALC-MCNC: 136 MG/DL (ref 0–100)
LDLC/HDLC SERPL: 3.01 {RATIO}
POTASSIUM BLD-SCNC: 4.1 MMOL/L (ref 3.5–5.2)
PROT SERPL-MCNC: 6.8 G/DL (ref 6–8.5)
SODIUM BLD-SCNC: 141 MMOL/L (ref 136–145)
TRIGL SERPL-MCNC: 182 MG/DL (ref 0–150)
VLDLC SERPL-MCNC: 36.4 MG/DL (ref 5–40)

## 2018-02-07 PROCEDURE — 36415 COLL VENOUS BLD VENIPUNCTURE: CPT | Performed by: INTERNAL MEDICINE

## 2018-02-07 PROCEDURE — 82306 VITAMIN D 25 HYDROXY: CPT | Performed by: INTERNAL MEDICINE

## 2018-02-07 PROCEDURE — 80061 LIPID PANEL: CPT | Performed by: INTERNAL MEDICINE

## 2018-02-07 PROCEDURE — 80053 COMPREHEN METABOLIC PANEL: CPT | Performed by: INTERNAL MEDICINE

## 2018-02-12 ENCOUNTER — OFFICE VISIT (OUTPATIENT)
Dept: INTERNAL MEDICINE | Facility: CLINIC | Age: 63
End: 2018-02-12

## 2018-02-12 VITALS
DIASTOLIC BLOOD PRESSURE: 62 MMHG | RESPIRATION RATE: 14 BRPM | SYSTOLIC BLOOD PRESSURE: 104 MMHG | HEIGHT: 64 IN | WEIGHT: 152 LBS | BODY MASS INDEX: 25.95 KG/M2

## 2018-02-12 DIAGNOSIS — Z00.00 HEALTH CARE MAINTENANCE: ICD-10-CM

## 2018-02-12 DIAGNOSIS — E55.9 VITAMIN D DEFICIENCY: ICD-10-CM

## 2018-02-12 DIAGNOSIS — E78.00 PURE HYPERCHOLESTEROLEMIA: Primary | ICD-10-CM

## 2018-02-12 PROCEDURE — 99213 OFFICE O/P EST LOW 20 MIN: CPT | Performed by: INTERNAL MEDICINE

## 2018-02-12 NOTE — PATIENT INSTRUCTIONS
"Hyperlipidemia - well controlled with statin. Normal liver function tests. LDL target is below < 100 mg/dl (CHD or \"CHD risk equivalent\" is present). Patient's 136, but for her this is 45% reduction. Continue same treatment. Regular exercise recommended.  Vitamin D deficiency - well compensated with over the counter Vit D3. Continue same dose for now and decrease the dose to 1000 IU a day in May. This supplement is fat-soluble and has to be taken with meals.    "

## 2018-02-12 NOTE — PROGRESS NOTES
"Subjective   Chris Moreno is a 62 y.o. female.     History of Present Illness     /62 (BP Location: Left arm, Patient Position: Sitting, Cuff Size: Adult)  Resp 14  Ht 162.6 cm (64\")  Wt 68.9 kg (152 lb)  BMI 26.09 kg/m2    Current Outpatient Prescriptions:   •  Cholecalciferol (VITAMIN D) 2000 UNITS capsule, Take 2,000 Units by mouth daily., Disp: , Rfl:   •  fluticasone (FLONASE) 50 MCG/ACT nasal spray, 2 sprays into each nostril Daily., Disp: 16 g, Rfl: 11  •  Loratadine (CLARITIN PO), Take  by mouth Daily., Disp: , Rfl:   •  Multiple Vitamins-Minerals (CENTRUM SILVER) tablet, Take 1 tablet by mouth daily., Disp: , Rfl:   •  olopatadine (PATADAY) 0.2 % solution ophthalmic solution, Administer 1 drop to both eyes Daily., Disp: 2.5 mL, Rfl: 11  •  raloxifene (EVISTA) 60 MG tablet, Take 1 tablet by mouth Daily., Disp: 90 tablet, Rfl: 3  •  rosuvastatin (CRESTOR) 20 MG tablet, Take 1 tablet by mouth Daily., Disp: 90 tablet, Rfl: 3    Patient has been diagnosed with hyperlipidemia. Target LDL is < 100 mg/dl (CHD or \"CHD risk equivalent\" is present). Patient is on low fat diet with good compliance. Patient is compliant with treatment: daily use of Crestor (rozuvastatin). Side effects of medication: none. Exercise 2 times a week and water aerobics.    Patient had been diagnosed with Vitamin D deficiency. Takes over the counter Vit D3 2000 IU a day. Patient does not have complaints of muscle or bone aches. Balance is good. No recent falls.       The following portions of the patient's history were reviewed and updated as appropriate: allergies, current medications, past family history, past medical history, past social history, past surgical history and problem list.    Review of Systems   Constitutional: Negative for chills and fever.   Eyes: Negative for pain and redness.   Respiratory: Negative for cough and shortness of breath.    Cardiovascular: Negative for chest pain and leg swelling.   Neurological: " Positive for headaches. Negative for dizziness.       Objective   Physical Exam   Constitutional: She is oriented to person, place, and time. She appears well-developed and well-nourished.   HENT:   Head: Normocephalic and atraumatic.   Right Ear: Tympanic membrane, external ear and ear canal normal.   Left Ear: Tympanic membrane, external ear and ear canal normal.   Nose: Nose normal. Right sinus exhibits no maxillary sinus tenderness and no frontal sinus tenderness. Left sinus exhibits no maxillary sinus tenderness and no frontal sinus tenderness.   Mouth/Throat: Uvula is midline, oropharynx is clear and moist and mucous membranes are normal.   Eyes: Conjunctivae and EOM are normal. Pupils are equal, round, and reactive to light. Right eye exhibits no discharge. Left eye exhibits no discharge. No scleral icterus.   Neck: Neck supple. No JVD present.   Cardiovascular: Normal rate, regular rhythm and normal heart sounds.  Exam reveals no gallop and no friction rub.    No murmur heard.  Pulmonary/Chest: Effort normal and breath sounds normal. She has no wheezes. She has no rales.   Musculoskeletal: She exhibits no edema.   Lymphadenopathy:     She has no cervical adenopathy.   Neurological: She is alert and oriented to person, place, and time. No cranial nerve deficit.   Skin: Skin is warm and dry. No rash noted.   Psychiatric: She has a normal mood and affect. Her behavior is normal.   Vitals reviewed.      Assessment/Plan   Chris was seen today for hyperlipidemia and vitamin d deficiency.    Diagnoses and all orders for this visit:    Pure hypercholesterolemia    Vitamin D deficiency  -     Vitamin D 25 Hydroxy; Future    Health care maintenance  -     CBC & Differential; Future  -     Comprehensive Metabolic Panel; Future  -     Lipid Panel; Future  -     TSH; Future  -     Urinalysis With / Microscopic If Indicated - Urine, Clean Catch; Future        Hyperlipidemia - well controlled with statin. Normal liver  "function tests. LDL target is below < 100 mg/dl (CHD or \"CHD risk equivalent\" is present). Patient's 136, but for her this is 45% reduction. Continue same treatment. Regular exercise recommended.  Vitamin D deficiency - well compensated with over the counter Vit D3. Continue same dose for now and decrease the dose to 1000 IU a day in May. This supplement is fat-soluble and has to be taken with meals.       "

## 2018-06-19 DIAGNOSIS — Z12.31 VISIT FOR SCREENING MAMMOGRAM: Primary | ICD-10-CM

## 2018-07-10 DIAGNOSIS — Z12.31 VISIT FOR SCREENING MAMMOGRAM: ICD-10-CM

## 2018-08-15 ENCOUNTER — LAB (OUTPATIENT)
Dept: INTERNAL MEDICINE | Facility: CLINIC | Age: 63
End: 2018-08-15

## 2018-08-15 DIAGNOSIS — E55.9 VITAMIN D DEFICIENCY: ICD-10-CM

## 2018-08-15 DIAGNOSIS — Z00.00 HEALTH CARE MAINTENANCE: ICD-10-CM

## 2018-08-15 LAB
25(OH)D3 SERPL-MCNC: 36.4 NG/ML (ref 30–100)
ALBUMIN SERPL-MCNC: 4.1 G/DL (ref 3.5–5.2)
ALBUMIN/GLOB SERPL: 1.5 G/DL
ALP SERPL-CCNC: 60 U/L (ref 39–117)
ALT SERPL W P-5'-P-CCNC: 13 U/L (ref 1–33)
ANION GAP SERPL CALCULATED.3IONS-SCNC: 11.8 MMOL/L
AST SERPL-CCNC: 15 U/L (ref 1–32)
BASOPHILS # BLD AUTO: 0.02 10*3/MM3 (ref 0–0.2)
BASOPHILS NFR BLD AUTO: 0.4 % (ref 0–2)
BILIRUB SERPL-MCNC: 0.5 MG/DL (ref 0.1–1.2)
BILIRUB UR QL STRIP: NEGATIVE
BUN BLD-MCNC: 15 MG/DL (ref 8–23)
BUN/CREAT SERPL: 20.3 (ref 7–25)
CALCIUM SPEC-SCNC: 9.1 MG/DL (ref 8.6–10.5)
CHLORIDE SERPL-SCNC: 106 MMOL/L (ref 98–107)
CHOLEST SERPL-MCNC: 214 MG/DL (ref 0–200)
CLARITY UR: CLEAR
CO2 SERPL-SCNC: 23.2 MMOL/L (ref 22–29)
COLOR UR: YELLOW
CREAT BLD-MCNC: 0.74 MG/DL (ref 0.57–1)
DEPRECATED RDW RBC AUTO: 41.8 FL (ref 37–54)
EOSINOPHIL # BLD AUTO: 0.17 10*3/MM3 (ref 0–0.7)
EOSINOPHIL NFR BLD AUTO: 3 % (ref 0–5)
ERYTHROCYTE [DISTWIDTH] IN BLOOD BY AUTOMATED COUNT: 12.7 % (ref 11.5–15)
GFR SERPL CREATININE-BSD FRML MDRD: 79 ML/MIN/1.73
GLOBULIN UR ELPH-MCNC: 2.7 GM/DL
GLUCOSE BLD-MCNC: 94 MG/DL (ref 65–99)
GLUCOSE UR STRIP-MCNC: NEGATIVE MG/DL
HCT VFR BLD AUTO: 38.7 % (ref 34.1–44.9)
HDLC SERPL-MCNC: 42 MG/DL (ref 40–60)
HGB BLD-MCNC: 13.2 G/DL (ref 11.2–15.7)
HGB UR QL STRIP.AUTO: NEGATIVE
KETONES UR QL STRIP: NEGATIVE
LDLC SERPL CALC-MCNC: 141 MG/DL (ref 0–100)
LDLC/HDLC SERPL: 3.35 {RATIO}
LEUKOCYTE ESTERASE UR QL STRIP.AUTO: NEGATIVE
LYMPHOCYTES # BLD AUTO: 2.32 10*3/MM3 (ref 0.8–7)
LYMPHOCYTES NFR BLD AUTO: 41.6 % (ref 10–60)
MCH RBC QN AUTO: 31.2 PG (ref 26–34)
MCHC RBC AUTO-ENTMCNC: 34.1 G/DL (ref 31–37)
MCV RBC AUTO: 91.5 FL (ref 80–100)
MONOCYTES # BLD AUTO: 0.38 10*3/MM3 (ref 0–1)
MONOCYTES NFR BLD AUTO: 6.8 % (ref 0–13)
NEUTROPHILS # BLD AUTO: 2.69 10*3/MM3 (ref 1–11)
NEUTROPHILS NFR BLD AUTO: 48.2 % (ref 30–85)
NITRITE UR QL STRIP: NEGATIVE
PH UR STRIP.AUTO: 5.5 [PH] (ref 5–8)
PLATELET # BLD AUTO: 215 10*3/MM3 (ref 150–450)
PMV BLD AUTO: 11.7 FL (ref 6–12)
POTASSIUM BLD-SCNC: 3.9 MMOL/L (ref 3.5–5.2)
PROT SERPL-MCNC: 6.8 G/DL (ref 6–8.5)
PROT UR QL STRIP: NEGATIVE
RBC # BLD AUTO: 4.23 10*6/MM3 (ref 3.93–5.22)
SODIUM BLD-SCNC: 141 MMOL/L (ref 136–145)
SP GR UR STRIP: 1.02 (ref 1–1.03)
TRIGL SERPL-MCNC: 157 MG/DL (ref 0–150)
TSH SERPL-ACNC: 2.93 MIU/ML (ref 0.27–4.2)
UROBILINOGEN UR QL STRIP: NORMAL
VLDLC SERPL-MCNC: 31.4 MG/DL (ref 5–40)
WBC NRBC COR # BLD: 5.58 10*3/MM3 (ref 5–10)

## 2018-08-15 PROCEDURE — 85025 COMPLETE CBC W/AUTO DIFF WBC: CPT | Performed by: INTERNAL MEDICINE

## 2018-08-15 PROCEDURE — 80061 LIPID PANEL: CPT | Performed by: INTERNAL MEDICINE

## 2018-08-15 PROCEDURE — 81003 URINALYSIS AUTO W/O SCOPE: CPT | Performed by: INTERNAL MEDICINE

## 2018-08-15 PROCEDURE — 80053 COMPREHEN METABOLIC PANEL: CPT | Performed by: INTERNAL MEDICINE

## 2018-08-20 ENCOUNTER — OFFICE VISIT (OUTPATIENT)
Dept: INTERNAL MEDICINE | Facility: CLINIC | Age: 63
End: 2018-08-20

## 2018-08-20 VITALS
DIASTOLIC BLOOD PRESSURE: 74 MMHG | OXYGEN SATURATION: 96 % | BODY MASS INDEX: 26.12 KG/M2 | SYSTOLIC BLOOD PRESSURE: 110 MMHG | WEIGHT: 153 LBS | HEIGHT: 64 IN | HEART RATE: 76 BPM

## 2018-08-20 DIAGNOSIS — Z00.00 HEALTH CARE MAINTENANCE: Primary | ICD-10-CM

## 2018-08-20 DIAGNOSIS — E78.00 PURE HYPERCHOLESTEROLEMIA: ICD-10-CM

## 2018-08-20 DIAGNOSIS — E55.9 VITAMIN D DEFICIENCY: ICD-10-CM

## 2018-08-20 DIAGNOSIS — Z23 NEED FOR PNEUMOCOCCAL VACCINATION: ICD-10-CM

## 2018-08-20 PROCEDURE — 99213 OFFICE O/P EST LOW 20 MIN: CPT | Performed by: INTERNAL MEDICINE

## 2018-08-20 PROCEDURE — G0402 INITIAL PREVENTIVE EXAM: HCPCS | Performed by: INTERNAL MEDICINE

## 2018-08-20 PROCEDURE — G0403 EKG FOR INITIAL PREVENT EXAM: HCPCS | Performed by: INTERNAL MEDICINE

## 2018-08-20 RX ORDER — ROSUVASTATIN CALCIUM 20 MG/1
20 TABLET, COATED ORAL DAILY
Qty: 90 TABLET | Refills: 3 | Status: SHIPPED | OUTPATIENT
Start: 2018-08-20 | End: 2019-08-26 | Stop reason: SDUPTHER

## 2018-08-20 NOTE — PROGRESS NOTES
"Subjective   Chris Moreno is a 62 y.o. female. Here for Welcome Medicare visit    History of Present Illness   /74 (BP Location: Left arm, Patient Position: Sitting, Cuff Size: Adult)   Pulse 76   Ht 162.6 cm (64\")   Wt 69.4 kg (153 lb)   SpO2 96%   BMI 26.26 kg/m²   Patient is being seen for subsequent wellness visit.  Hospitalizations in a past: 3, all surgical, none last year  Once per lifetime Medicare screening tests: ECG - not done yet    AAA risk assessment: 1. FH: none. 2.H/o tobacco smoking: none. 3. CV or PAD: none.    Tobacco use: none   Alcohol use: occasionally  Illicit drugs use: none  Diet: healthy heart  Exercise: 2 times a week and aerobic exercise    Anxiety screening: How many days in the last 2 weeks you were  1. Feeling anxious, nervous, on edge: one, when she had to give presentation  2. Unable to stop worrying: none  3. Worrying too much about different things: none  4. Having problems relaxing:none  5. Feeling restless or unable to sit still:none  6. Feeling irritable or easely annoyed: none  7. Being afraid that something awful might happen: none    Depression screening PHQ9:  Over the past 2 weeks how often have you been bothered by  1. Lack of interest or pleasuer in usual activities: none  2. Feeling down, depressed, hopeless:none  3. Troubles falling asleep/sleeping too long:none  4. Feeling tired, having little energy: none  5. Poor appetite or overeating: none  6. Feeling bad about yourself:none.  7. Trouble concentrating: at the baseline.  8. Moving or speaking too slow or much faster than usual: none  9. Thoughts about harming yourself:none.    Cognitive impairment screening:   Do you have difficulties with:  1. Language: no  2. Behavior: no  3. Learning/retaining new information: no  4. Handling complex tasks: no  5. Reasoning: no  6. Spacial ability and orientation: no    Hearing: normal.  Driving: unrestricted  ADL: independent  ADL details: Does patient needs help " "with:  telephone use: no  Transportation: no  Housework: no  Shopping: no  meal preparation: no  laundry: no  managing medication:no  Participate in the social activities: Y    Fall risk factors:   1.Use of alcohol: no    2.Polypharmacy: no  3.H/o of previous fall:  no  4. Mobility impairment:  no  5. Visual impairment:  no  6. Deconditioning: no  7. Use of antihypertensive medication:  no  8. Use of sedatives: no  9. Use of antidepressant: no    Home safety:   1.loose rugs: no   2.unfamiliar environment: no   3. Uneven floors: no   4. No grab bars in the bathroom: no  5. No banister on stairs: no      Advanced directives: has Living Will. Discussed. I agree with patient wishes and decision..    Co-managers: ophthalmology , dermatology , GYN , vascular surgery Dr.Salen Ann, general surgeon Dr.Antony Ann, oncologist         /74 (BP Location: Left arm, Patient Position: Sitting, Cuff Size: Adult)   Pulse 76   Ht 162.6 cm (64\")   Wt 69.4 kg (153 lb)   SpO2 96%   BMI 26.26 kg/m²     Current Outpatient Prescriptions:   •  Cholecalciferol (VITAMIN D) 2000 UNITS capsule, Take 2,000 Units by mouth daily., Disp: , Rfl:   •  fluticasone (FLONASE) 50 MCG/ACT nasal spray, 2 sprays into each nostril Daily., Disp: 16 g, Rfl: 11  •  Loratadine (CLARITIN PO), Take  by mouth Daily., Disp: , Rfl:   •  Multiple Vitamins-Minerals (CENTRUM SILVER) tablet, Take 1 tablet by mouth daily., Disp: , Rfl:   •  olopatadine (PATADAY) 0.2 % solution ophthalmic solution, Administer 1 drop to both eyes Daily., Disp: 2.5 mL, Rfl: 11  •  raloxifene (EVISTA) 60 MG tablet, Take 1 tablet by mouth Daily., Disp: 90 tablet, Rfl: 3  •  rosuvastatin (CRESTOR) 20 MG tablet, Take 1 tablet by mouth Daily., Disp: 90 tablet, Rfl: 3    Patient has been diagnosed with hyperlipidemia. Target LDL is < 130 mg/dl (2 or more risk factors are present). Patient is on low fat diet with good compliance. Patient is compliant " with treatment: daily use of Crestor (rozuvastatin). Side effects of medication: none. Exercise 3-4 days a week.    Patient had been diagnosed with Vitamin D deficiency. Takes over the counter Vit D3 1000 IU a day. Patient does not have complaints of muscle or bone aches. Balance is good. No recent falls.                                   The following portions of the patient's history were reviewed and updated as appropriate: allergies, current medications, past family history, past medical history, past social history, past surgical history and problem list.    Review of Systems   Constitutional: Negative for chills and fever.   HENT: Negative for congestion, postnasal drip, sinus pressure and sore throat.    Eyes: Negative for pain and itching.   Respiratory: Negative for cough and chest tightness.    Cardiovascular: Negative for chest pain and leg swelling.   Gastrointestinal: Negative for abdominal pain and blood in stool.   Endocrine: Negative for cold intolerance and heat intolerance.   Genitourinary: Negative for difficulty urinating and flank pain.   Musculoskeletal: Negative for back pain and neck pain.   Skin: Negative for color change, rash and wound.   Neurological: Negative for dizziness, weakness and headaches.   Hematological: Negative for adenopathy. Does not bruise/bleed easily.   Psychiatric/Behavioral: Negative for sleep disturbance. The patient is not nervous/anxious.        Objective   Physical Exam   Constitutional: She is oriented to person, place, and time. She appears well-developed and well-nourished. No distress.   HENT:   Head: Normocephalic and atraumatic.   Right Ear: External ear normal.   Left Ear: External ear normal.   Nose: Nose normal.   Mouth/Throat: Oropharynx is clear and moist. No oropharyngeal exudate.   Eyes: Pupils are equal, round, and reactive to light. Conjunctivae and EOM are normal. Right eye exhibits no discharge. Left eye exhibits no discharge. No scleral icterus.    Neck: Normal range of motion. Neck supple. No JVD present. No thyromegaly present.   Cardiovascular: Normal rate, regular rhythm, S1 normal, S2 normal, normal heart sounds and intact distal pulses.  Exam reveals no gallop and no friction rub.    No murmur heard.  Pulmonary/Chest: Effort normal and breath sounds normal. No respiratory distress. She has no decreased breath sounds. She has no wheezes. She has no rhonchi. She has no rales. Right breast exhibits no inverted nipple, no mass, no nipple discharge, no skin change and no tenderness. Left breast exhibits no inverted nipple, no mass, no nipple discharge, no skin change and no tenderness. Breasts are symmetrical.   Abdominal: Soft. Bowel sounds are normal. She exhibits no distension and no mass. There is no tenderness. There is no rebound and no guarding.   Musculoskeletal: She exhibits no edema.   Lymphadenopathy:        Head (right side): No submental, no submandibular, no preauricular, no posterior auricular and no occipital adenopathy present.        Head (left side): No submental, no submandibular, no preauricular, no posterior auricular and no occipital adenopathy present.     She has no cervical adenopathy.        Right cervical: No superficial cervical, no deep cervical and no posterior cervical adenopathy present.       Left cervical: No superficial cervical, no deep cervical and no posterior cervical adenopathy present.     She has no axillary adenopathy.        Right: No supraclavicular adenopathy present.        Left: No supraclavicular adenopathy present.   Neurological: She is alert and oriented to person, place, and time. She has normal reflexes. She displays normal reflexes. No cranial nerve deficit. She exhibits normal muscle tone. Coordination normal.   Reflex Scores:       Bicep reflexes are 2+ on the right side and 2+ on the left side.       Patellar reflexes are 2+ on the right side and 2+ on the left side.  Skin: Skin is warm. No rash  noted. She is not diaphoretic. No erythema.   Psychiatric: She has a normal mood and affect. Her behavior is normal. Thought content normal.   Vitals reviewed.    Reason for ECG:  screening  Rhythm: sinus  Arterial rate: 70  AK interval: nl  P waves:nl  QRS:nl  ST: nl   Comparison: unavailable   Impression: normal ECG  Assessment/Plan   Chris was seen today for subsequent medicare wellness.    Diagnoses and all orders for this visit:    Need for pneumococcal vaccination  -     Pneumococcal Conjugate Vaccine 13-Valent All    Health care maintenance    Pure hypercholesterolemia  -     Comprehensive Metabolic Panel; Future  -     Lipid Panel; Future  -     rosuvastatin (CRESTOR) 20 MG tablet; Take 1 tablet by mouth Daily.    Vitamin D deficiency  -     Vitamin D 25 Hydroxy; Future        Annual wellness visit with preventive exam as well as age and risk appropriate councelling completed.    Cardiovascular disease councelling: current. Recommended: Healthy Heart diet recommended., Continue statin.  Diabetes screening and councelling: current. Recommended: Not at risk for diabetes.  Breast cancer screening: up to date. Recommended every year..  Osteoporosis screening: up to date. Recommended frequency every 2 years. Next DEXA is due in 2019..  Glaucoma screening: up to date.   AAA screening: not needed..  Hep C screening (born between [birth year]-5214) completed..  Colorectal cancer screening: up to date. Recommended every 10 years. Next screening is due in 2026..    Immunizations:   1. Influenza vaccine  is up to date and recommended yearly.   2. Pneumococcal vaccines: Prevnar 13 will be given today, pneumovaccine 23 will be administered in a year.   3. Shingles prevention:  Zostavax completed, recommended to get new shingles vaccine Shindrix at [facility], benefits explained.      Advanced directives planning: completed and Living Will is on file in the hospital. Discussed. I agree with patients decision..    Medications  reviewed and medication list updated.   Potential harmful drug-disease interactions in the elderly:none.  High risk medication in elderly: none  ASA use: no indications.    Hyperlipidemia - well controlled with statin. Normal liver function tests. LDL target is below < 130 mg/dl (2 or more risk factors are present). Patient's 141. Continue same treatment. Regular exercise recommended.  Vitamin D deficiency - well compensated with over the counter Vit D3. Continue same dose. This supplement is fat-soluble and has to be taken with meals.

## 2018-08-21 PROCEDURE — 90670 PCV13 VACCINE IM: CPT | Performed by: INTERNAL MEDICINE

## 2018-08-21 PROCEDURE — G0009 ADMIN PNEUMOCOCCAL VACCINE: HCPCS | Performed by: INTERNAL MEDICINE

## 2018-08-24 DIAGNOSIS — M85.80 OSTEOPENIA: ICD-10-CM

## 2018-08-24 RX ORDER — RALOXIFENE HYDROCHLORIDE 60 MG/1
TABLET, FILM COATED ORAL
Qty: 90 TABLET | Refills: 3 | Status: SHIPPED | OUTPATIENT
Start: 2018-08-24 | End: 2018-11-02 | Stop reason: SDUPTHER

## 2018-10-29 ENCOUNTER — LAB (OUTPATIENT)
Dept: LAB | Facility: HOSPITAL | Age: 63
End: 2018-10-29

## 2018-10-29 ENCOUNTER — OFFICE VISIT (OUTPATIENT)
Dept: ONCOLOGY | Facility: CLINIC | Age: 63
End: 2018-10-29

## 2018-10-29 VITALS
OXYGEN SATURATION: 98 % | DIASTOLIC BLOOD PRESSURE: 70 MMHG | SYSTOLIC BLOOD PRESSURE: 118 MMHG | BODY MASS INDEX: 26.29 KG/M2 | HEIGHT: 64 IN | TEMPERATURE: 98.6 F | RESPIRATION RATE: 16 BRPM | HEART RATE: 76 BPM | WEIGHT: 154 LBS

## 2018-10-29 DIAGNOSIS — Z85.820 HISTORY OF MELANOMA: ICD-10-CM

## 2018-10-29 DIAGNOSIS — Z85.3 HISTORY OF BREAST CANCER: Primary | ICD-10-CM

## 2018-10-29 DIAGNOSIS — M35.01 KERATOCONJUNCTIVITIS SICCA (HCC): Primary | ICD-10-CM

## 2018-10-29 DIAGNOSIS — M81.0 AGE-RELATED OSTEOPOROSIS WITHOUT CURRENT PATHOLOGICAL FRACTURE: ICD-10-CM

## 2018-10-29 DIAGNOSIS — M85.80 OSTEOPENIA, UNSPECIFIED LOCATION: ICD-10-CM

## 2018-10-29 LAB
ALBUMIN SERPL-MCNC: 4.1 G/DL (ref 3.5–5.2)
ALBUMIN/GLOB SERPL: 1.5 G/DL (ref 1.1–2.4)
ALP SERPL-CCNC: 63 U/L (ref 38–116)
ALT SERPL W P-5'-P-CCNC: 11 U/L (ref 0–33)
ANION GAP SERPL CALCULATED.3IONS-SCNC: 13.6 MMOL/L
AST SERPL-CCNC: 16 U/L (ref 0–32)
BASOPHILS # BLD AUTO: 0.05 10*3/MM3 (ref 0–0.1)
BASOPHILS NFR BLD AUTO: 0.9 % (ref 0–1.1)
BILIRUB SERPL-MCNC: 0.5 MG/DL (ref 0.1–1.2)
BUN BLD-MCNC: 15 MG/DL (ref 6–20)
BUN/CREAT SERPL: 20.3 (ref 7.3–30)
CALCIUM SPEC-SCNC: 8.7 MG/DL (ref 8.5–10.2)
CHLORIDE SERPL-SCNC: 106 MMOL/L (ref 98–107)
CO2 SERPL-SCNC: 21.4 MMOL/L (ref 22–29)
CREAT BLD-MCNC: 0.74 MG/DL (ref 0.6–1.1)
DEPRECATED RDW RBC AUTO: 42.6 FL (ref 37–49)
EOSINOPHIL # BLD AUTO: 0.17 10*3/MM3 (ref 0–0.36)
EOSINOPHIL NFR BLD AUTO: 3 % (ref 1–5)
ERYTHROCYTE [DISTWIDTH] IN BLOOD BY AUTOMATED COUNT: 12.7 % (ref 11.7–14.5)
GFR SERPL CREATININE-BSD FRML MDRD: 79 ML/MIN/1.73
GLOBULIN UR ELPH-MCNC: 2.7 GM/DL (ref 1.8–3.5)
GLUCOSE BLD-MCNC: 99 MG/DL (ref 74–124)
HCT VFR BLD AUTO: 40.4 % (ref 34–45)
HGB BLD-MCNC: 13.5 G/DL (ref 11.5–14.9)
IMM GRANULOCYTES # BLD: 0 10*3/MM3 (ref 0–0.03)
IMM GRANULOCYTES NFR BLD: 0 % (ref 0–0.5)
LYMPHOCYTES # BLD AUTO: 2.5 10*3/MM3 (ref 1–3.5)
LYMPHOCYTES NFR BLD AUTO: 44.2 % (ref 20–49)
MCH RBC QN AUTO: 30.7 PG (ref 27–33)
MCHC RBC AUTO-ENTMCNC: 33.4 G/DL (ref 32–35)
MCV RBC AUTO: 91.8 FL (ref 83–97)
MONOCYTES # BLD AUTO: 0.39 10*3/MM3 (ref 0.25–0.8)
MONOCYTES NFR BLD AUTO: 6.9 % (ref 4–12)
NEUTROPHILS # BLD AUTO: 2.55 10*3/MM3 (ref 1.5–7)
NEUTROPHILS NFR BLD AUTO: 45 % (ref 39–75)
NRBC BLD MANUAL-RTO: 0 /100 WBC (ref 0–0)
PLATELET # BLD AUTO: 243 10*3/MM3 (ref 150–375)
PMV BLD AUTO: 10.9 FL (ref 8.9–12.1)
POTASSIUM BLD-SCNC: 4.2 MMOL/L (ref 3.5–4.7)
PROT SERPL-MCNC: 6.8 G/DL (ref 6.3–8)
RBC # BLD AUTO: 4.4 10*6/MM3 (ref 3.9–5)
SODIUM BLD-SCNC: 141 MMOL/L (ref 134–145)
WBC NRBC COR # BLD: 5.66 10*3/MM3 (ref 4–10)

## 2018-10-29 PROCEDURE — 80053 COMPREHEN METABOLIC PANEL: CPT

## 2018-10-29 PROCEDURE — 36415 COLL VENOUS BLD VENIPUNCTURE: CPT | Performed by: INTERNAL MEDICINE

## 2018-10-29 PROCEDURE — 85025 COMPLETE CBC W/AUTO DIFF WBC: CPT | Performed by: INTERNAL MEDICINE

## 2018-10-29 PROCEDURE — 99214 OFFICE O/P EST MOD 30 MIN: CPT | Performed by: INTERNAL MEDICINE

## 2018-10-29 NOTE — PROGRESS NOTES
Subjective   REASONS FOR FOLLOWUP:   1. 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   2. Negative genetic testing for BRCA1 and BRCA2 in July 2006.   3. Osteopenia  HISTORY OF PRESENT ILLNESS:   The patient is a 63 y.o. female with the above-noted history of DCIS and microinvasion in the right breast in 2006. She underwent mastectomy and TRAM flap reconstruction. She was started on Evista as a breast cancer prevention and has elected to remain on Evista after completing 5 years of therapy due to the fact that she also has significant osteopenia.       She is up-to-date on her mammograms and bone density scans.  She has not had any other major health issues this year and looks great in the office today.  She also continues to follow-up with her dermatologist annually for a skin exam due to her past history of melanoma excised from the left lower leg.  History of Present Illness      Past Medical History, Past Surgical History, Social History, Family History have been reviewed and are without significant changes except as mentioned.    Review of Systems   Constitutional: Negative for activity change, appetite change, fatigue, fever and unexpected weight change.   HENT: Negative for hearing loss, nosebleeds, trouble swallowing and voice change.    Eyes: Negative for visual disturbance.   Respiratory: Negative for cough, shortness of breath and wheezing.    Cardiovascular: Negative for chest pain and palpitations.   Gastrointestinal: Negative for abdominal pain, diarrhea, nausea and vomiting.   Genitourinary: Negative for difficulty urinating, frequency, hematuria and urgency.   Musculoskeletal: Negative for back pain and neck pain.   Skin: Negative for rash.   Neurological: Negative for dizziness, seizures, syncope and headaches.   Hematological: Negative  "for adenopathy. Does not bruise/bleed easily.   Psychiatric/Behavioral: Negative for behavioral problems. The patient is not nervous/anxious.       A comprehensive 14 point review of systems was performed and was negative except as mentioned.    Medications:  The current medication list was reviewed in the EMR    ALLERGIES:  No Known Allergies    Objective      Vitals:    10/29/18 1307   BP: 118/70   Pulse: 76   Resp: 16   Temp: 98.6 °F (37 °C)   TempSrc: Oral   SpO2: 98%   Weight: 69.9 kg (154 lb)   Height: 162 cm (63.78\")  Comment: new ht   PainSc: 0-No pain     Current Status 10/29/2018   ECOG score 0       Physical Exam   Constitutional: She is oriented to person, place, and time. She appears well-developed and well-nourished. No distress.   HENT:   Head: Normocephalic.   Eyes: Pupils are equal, round, and reactive to light. EOM are normal.   Neck: Neck supple. No JVD present. No thyromegaly present.   Cardiovascular: Normal rate and regular rhythm.  Exam reveals no gallop and no friction rub.    No murmur heard.  Pulmonary/Chest: Effort normal and breath sounds normal. She has no wheezes. She has no rales. Left breast exhibits no mass, no nipple discharge and no skin change.   Right TRAM reconstruction.   Abdominal: Soft. Bowel sounds are normal. She exhibits no distension and no mass. There is no tenderness.   Musculoskeletal: She exhibits no edema or deformity.   Neurological: She is alert and oriented to person, place, and time. She has normal reflexes. No cranial nerve deficit.   Skin: Skin is dry. No rash noted.   Psychiatric: She has a normal mood and affect. Judgment normal.        RECENT LABS:  Hematology WBC   Date Value Ref Range Status   10/29/2018 5.66 4.00 - 10.00 10*3/mm3 Final     RBC   Date Value Ref Range Status   10/29/2018 4.40 3.90 - 5.00 10*6/mm3 Final     Hemoglobin   Date Value Ref Range Status   10/29/2018 13.5 11.5 - 14.9 g/dL Final     Hematocrit   Date Value Ref Range Status "   10/29/2018 40.4 34.0 - 45.0 % Final     Platelets   Date Value Ref Range Status   10/29/2018 243 150 - 375 10*3/mm3 Final       Mammogram 6/19/2018   OK           DEXA 8/16/2017     Osteopenia    Assessment/Plan   1.  DCIS with microinvasion of the right breast status post mastectomy and TRAM flap reconstruction.  She also received therapy with Evista as breast cancer prevention strategy.  After 5 years of Evista she elected to remain on the medication due to her osteopenia.  2.  Melanoma excised from the leg.  She continues regular follow-up with her dermatologist.    Plan  1.  Return in 12 months for routine follow-up.  2.  Her next mammogram  will be due in June 2018.  3.  Next DEXA scan will be due in August 2019.                  10/29/2018      CC:

## 2018-11-02 ENCOUNTER — TELEPHONE (OUTPATIENT)
Dept: INTERNAL MEDICINE | Facility: CLINIC | Age: 63
End: 2018-11-02

## 2018-11-02 DIAGNOSIS — M85.80 OSTEOPENIA, UNSPECIFIED LOCATION: ICD-10-CM

## 2018-11-02 DIAGNOSIS — J30.9 ALLERGIC RHINITIS, UNSPECIFIED SEASONALITY, UNSPECIFIED TRIGGER: ICD-10-CM

## 2018-11-02 RX ORDER — OLOPATADINE HYDROCHLORIDE 2 MG/ML
1 SOLUTION/ DROPS OPHTHALMIC DAILY
Qty: 2.5 ML | Refills: 11 | Status: SHIPPED | OUTPATIENT
Start: 2018-11-02 | End: 2020-01-13

## 2018-11-02 RX ORDER — RALOXIFENE HYDROCHLORIDE 60 MG/1
60 TABLET, FILM COATED ORAL DAILY
Qty: 90 TABLET | Refills: 3 | Status: SHIPPED | OUTPATIENT
Start: 2018-11-02 | End: 2019-10-23 | Stop reason: SDUPTHER

## 2018-11-02 RX ORDER — FLUTICASONE PROPIONATE 50 MCG
2 SPRAY, SUSPENSION (ML) NASAL DAILY
Qty: 16 G | Refills: 11 | Status: SHIPPED | OUTPATIENT
Start: 2018-11-02 | End: 2021-02-18 | Stop reason: SDUPTHER

## 2018-11-02 NOTE — TELEPHONE ENCOUNTER
----- Message from Tamara Ventura sent at 11/2/2018 10:52 AM EDT -----  Contact: Pt   Pt is calling for a refill     FOR  fluticasone (FLONASE) 50 MCG/ACT nasal spray  olopatadine (PATADAY) 0.2 % solution ophthalmic solution  raloxifene (EVISTA) 60 MG tablet    Patient requests RX SENT TO   [facility]/pharmacy #[id number] - [place], [place] - [address] [phone] [facility] [phone] FX      Caller# [phone]    Please and thank you.

## 2019-01-21 ENCOUNTER — OFFICE VISIT (OUTPATIENT)
Dept: INTERNAL MEDICINE | Facility: CLINIC | Age: 64
End: 2019-01-21

## 2019-01-21 VITALS
HEIGHT: 64 IN | SYSTOLIC BLOOD PRESSURE: 110 MMHG | WEIGHT: 152 LBS | OXYGEN SATURATION: 97 % | DIASTOLIC BLOOD PRESSURE: 80 MMHG | TEMPERATURE: 99.3 F | HEART RATE: 86 BPM | BODY MASS INDEX: 25.95 KG/M2 | RESPIRATION RATE: 18 BRPM

## 2019-01-21 DIAGNOSIS — J40 BRONCHITIS: ICD-10-CM

## 2019-01-21 DIAGNOSIS — J02.9 SORE THROAT: Primary | ICD-10-CM

## 2019-01-21 LAB
EXPIRATION DATE: NORMAL
INTERNAL CONTROL: NORMAL
Lab: NORMAL
S PYO AG THROAT QL: NEGATIVE

## 2019-01-21 PROCEDURE — 87880 STREP A ASSAY W/OPTIC: CPT | Performed by: NURSE PRACTITIONER

## 2019-01-21 PROCEDURE — 99213 OFFICE O/P EST LOW 20 MIN: CPT | Performed by: NURSE PRACTITIONER

## 2019-01-21 RX ORDER — BENZONATATE 200 MG/1
200 CAPSULE ORAL 3 TIMES DAILY PRN
Qty: 30 CAPSULE | Refills: 0 | Status: SHIPPED | OUTPATIENT
Start: 2019-01-21 | End: 2019-02-28

## 2019-01-21 RX ORDER — GUAIFENESIN 600 MG/1
600 TABLET, EXTENDED RELEASE ORAL 2 TIMES DAILY
Qty: 14 TABLET | Refills: 0
Start: 2019-01-21 | End: 2019-01-28

## 2019-01-21 RX ORDER — AZITHROMYCIN 250 MG/1
TABLET, FILM COATED ORAL
Qty: 6 TABLET | Refills: 0 | Status: SHIPPED | OUTPATIENT
Start: 2019-01-21 | End: 2019-02-28

## 2019-01-21 NOTE — PATIENT INSTRUCTIONS
Acute Bronchitis, Adult  Acute bronchitis is when air tubes (bronchi) in the lungs suddenly get swollen. The condition can make it hard to breathe. It can also cause these symptoms:  · A cough.  · Coughing up clear, yellow, or green mucus.  · Wheezing.  · Chest congestion.  · Shortness of breath.  · A fever.  · Body aches.  · Chills.  · A sore throat.    Follow these instructions at home:  Medicines  · Take over-the-counter and prescription medicines only as told by your doctor.  · If you were prescribed an antibiotic medicine, take it as told by your doctor. Do not stop taking the antibiotic even if you start to feel better.  General instructions  · Rest.  · Drink enough fluids to keep your pee (urine) clear or pale yellow.  · Avoid smoking and secondhand smoke. If you smoke and you need help quitting, ask your doctor. Quitting will help your lungs heal faster.  · Use an inhaler, cool mist vaporizer, or humidifier as told by your doctor.  · Keep all follow-up visits as told by your doctor. This is important.  How is this prevented?  To lower your risk of getting this condition again:  · Wash your hands often with soap and water. If you cannot use soap and water, use hand .  · Avoid contact with people who have cold symptoms.  · Try not to touch your hands to your mouth, nose, or eyes.  · Make sure to get the flu shot every year.    Contact a doctor if:  · Your symptoms do not get better in 2 weeks.  Get help right away if:  · You cough up blood.  · You have chest pain.  · You have very bad shortness of breath.  · You become dehydrated.  · You faint (pass out) or keep feeling like you are going to pass out.  · You keep throwing up (vomiting).  · You have a very bad headache.  · Your fever or chills gets worse.  This information is not intended to replace advice given to you by your health care provider. Make sure you discuss any questions you have with your health care provider.  Document Released:  06/05/2009 Document Revised: 07/26/2017 Document Reviewed: 06/07/2017  [employer] Interactive Patient Education © 2018 [employer]  Pharyngitis  Pharyngitis is redness, pain, and swelling (inflammation) of the throat (pharynx). It is a very common cause of sore throat. Pharyngitis can be caused by a bacteria, but it is usually caused by a virus. Most cases of pharyngitis get better on their own without treatment.  What are the causes?  This condition may be caused by:  · Infection by viruses (viral). Viral pharyngitis spreads from person to person (is contagious) through coughing, sneezing, and sharing of personal items or utensils such as cups, forks, spoons, and toothbrushes.  · Infection by bacteria (bacterial). Bacterial pharyngitis may be spread by touching the nose or face after coming in contact with the bacteria, or through more intimate contact, such as kissing.  · Allergies. Allergies can cause buildup of mucus in the throat (post-nasal drip), leading to inflammation and irritation. Allergies can also cause blocked nasal passages, forcing breathing through the mouth, which dries and irritates the throat.    What increases the risk?  You are more likely to develop this condition if:  · You are 5-24 years old.  · You are exposed to crowded environments such as , school, or dormitory living.  · You live in a cold climate.  · You have a weakened disease-fighting (immune) system.    What are the signs or symptoms?  Symptoms of this condition vary by the cause (viral, bacterial, or allergies) and can include:  · Sore throat.  · Fatigue.  · Low-grade fever.  · Headache.  · Joint pain and muscle aches.  · Skin rashes.  · Swollen glands in the throat (lymph nodes).  · Plaque-like film on the throat or tonsils. This is often a symptom of bacterial pharyngitis.  · Vomiting.  · Stuffy nose (nasal congestion).  · Cough.  · Red, itchy eyes (conjunctivitis).  · Loss of appetite.    How is this diagnosed?  This  condition is often diagnosed based on your medical history and a physical exam. Your health care provider will ask you questions about your illness and your symptoms. A swab of your throat may be done to check for bacteria (rapid strep test). Other lab tests may also be done, depending on the suspected cause, but these are rare.  How is this treated?  This condition usually gets better in 3-4 days without medicine. Bacterial pharyngitis may be treated with antibiotic medicines.  Follow these instructions at home:  · Take over-the-counter and prescription medicines only as told by your health care provider.  ? If you were prescribed an antibiotic medicine, take it as told by your health care provider. Do not stop taking the antibiotic even if you start to feel better.  ? Do not give children aspirin because of the association with Reye syndrome.  · Drink enough water and fluids to keep your urine clear or pale yellow.  · Get a lot of rest.  · Gargle with a salt-water mixture 3-4 times a day or as needed. To make a salt-water mixture, completely dissolve ½-1 tsp of salt in 1 cup of warm water.  · If your health care provider approves, you may use throat lozenges or sprays to soothe your throat.  Contact a health care provider if:  · You have large, tender lumps in your neck.  · You have a rash.  · You cough up green, yellow-brown, or bloody spit.  Get help right away if:  · Your neck becomes stiff.  · You drool or are unable to swallow liquids.  · You cannot drink or take medicines without vomiting.  · You have severe pain that does not go away, even after you take medicine.  · You have trouble breathing, and it is not caused by a stuffy nose.  · You have new pain and swelling in your joints such as the knees, ankles, wrists, or elbows.  Summary  · Pharyngitis is redness, pain, and swelling (inflammation) of the throat (pharynx).  · While pharyngitis can be caused by a bacteria, the most common causes are  viral.  · Most cases of pharyngitis get better on their own without treatment.  · Bacterial pharyngitis is treated with antibiotic medicines.  This information is not intended to replace advice given to you by your health care provider. Make sure you discuss any questions you have with your health care provider.  Document Released: 12/18/2006 Document Revised: 01/23/2018 Document Reviewed: 01/23/2018  [employer] Interactive Patient Education © 2018 [employer]

## 2019-02-15 ENCOUNTER — LAB (OUTPATIENT)
Dept: INTERNAL MEDICINE | Facility: CLINIC | Age: 64
End: 2019-02-15

## 2019-02-15 DIAGNOSIS — E78.00 PURE HYPERCHOLESTEROLEMIA: ICD-10-CM

## 2019-02-15 DIAGNOSIS — E55.9 VITAMIN D DEFICIENCY: ICD-10-CM

## 2019-02-15 LAB
25(OH)D3 SERPL-MCNC: 36.4 NG/ML (ref 30–100)
ALBUMIN SERPL-MCNC: 4.4 G/DL (ref 3.5–5.2)
ALBUMIN/GLOB SERPL: 1.4 G/DL
ALP SERPL-CCNC: 59 U/L (ref 39–117)
ALT SERPL W P-5'-P-CCNC: 12 U/L (ref 1–33)
ANION GAP SERPL CALCULATED.3IONS-SCNC: 14.7 MMOL/L
AST SERPL-CCNC: 11 U/L (ref 1–32)
BILIRUB SERPL-MCNC: 0.5 MG/DL (ref 0.1–1.2)
BUN BLD-MCNC: 17 MG/DL (ref 8–23)
BUN/CREAT SERPL: 21.3 (ref 7–25)
CALCIUM SPEC-SCNC: 9.5 MG/DL (ref 8.6–10.5)
CHLORIDE SERPL-SCNC: 102 MMOL/L (ref 98–107)
CHOLEST SERPL-MCNC: 224 MG/DL (ref 0–200)
CO2 SERPL-SCNC: 23.3 MMOL/L (ref 22–29)
CREAT BLD-MCNC: 0.8 MG/DL (ref 0.57–1)
GFR SERPL CREATININE-BSD FRML MDRD: 72 ML/MIN/1.73
GLOBULIN UR ELPH-MCNC: 3.2 GM/DL
GLUCOSE BLD-MCNC: 96 MG/DL (ref 65–99)
HDLC SERPL-MCNC: 47 MG/DL (ref 40–60)
LDLC SERPL CALC-MCNC: 141 MG/DL (ref 0–100)
LDLC/HDLC SERPL: 3.01 {RATIO}
POTASSIUM BLD-SCNC: 4 MMOL/L (ref 3.5–5.2)
PROT SERPL-MCNC: 7.6 G/DL (ref 6–8.5)
SODIUM BLD-SCNC: 140 MMOL/L (ref 136–145)
TRIGL SERPL-MCNC: 178 MG/DL (ref 0–150)
VLDLC SERPL-MCNC: 35.6 MG/DL (ref 5–40)

## 2019-02-15 PROCEDURE — 36415 COLL VENOUS BLD VENIPUNCTURE: CPT | Performed by: INTERNAL MEDICINE

## 2019-02-15 PROCEDURE — 82306 VITAMIN D 25 HYDROXY: CPT | Performed by: INTERNAL MEDICINE

## 2019-02-15 PROCEDURE — 80053 COMPREHEN METABOLIC PANEL: CPT | Performed by: INTERNAL MEDICINE

## 2019-02-15 PROCEDURE — 80061 LIPID PANEL: CPT | Performed by: INTERNAL MEDICINE

## 2019-02-28 ENCOUNTER — OFFICE VISIT (OUTPATIENT)
Dept: INTERNAL MEDICINE | Facility: CLINIC | Age: 64
End: 2019-02-28

## 2019-02-28 VITALS
HEART RATE: 74 BPM | DIASTOLIC BLOOD PRESSURE: 72 MMHG | WEIGHT: 149 LBS | BODY MASS INDEX: 25.44 KG/M2 | OXYGEN SATURATION: 99 % | HEIGHT: 64 IN | SYSTOLIC BLOOD PRESSURE: 112 MMHG

## 2019-02-28 DIAGNOSIS — E78.00 PURE HYPERCHOLESTEROLEMIA: Primary | ICD-10-CM

## 2019-02-28 DIAGNOSIS — E55.9 VITAMIN D DEFICIENCY: ICD-10-CM

## 2019-02-28 PROCEDURE — 99214 OFFICE O/P EST MOD 30 MIN: CPT | Performed by: INTERNAL MEDICINE

## 2019-02-28 RX ORDER — EZETIMIBE 10 MG/1
10 TABLET ORAL DAILY
Qty: 30 TABLET | Refills: 11 | Status: SHIPPED | OUTPATIENT
Start: 2019-02-28 | End: 2019-05-09

## 2019-02-28 NOTE — PATIENT INSTRUCTIONS
"Hyperlipidemia - not well contrlled with statin. Normal liver function tests. LDL target is below < 100 mg/dl (CHD or \"CHD risk equivalent\" is present). Patient's 141 on high dose of Crestor, will add Zetia 10 mg a day. Reevaluate in 2 months. Regular exercise recommended.  Vitamin D deficiency - well compensated with over the counter Vit D3. Continue same dose. This supplement is fat-soluble and has to be taken with meals.        "

## 2019-02-28 NOTE — PROGRESS NOTES
"Subjective   Chris Moreno is a 63 y.o. female.     History of Present Illness     /72 (BP Location: Left arm, Patient Position: Sitting, Cuff Size: Adult)   Pulse 74   Wt 67.6 kg (149 lb)   SpO2 99%   BMI 25.75 kg/m²     Current Outpatient Medications:   •  Calcium Citrate (CITRACAL PO), Take  by mouth Daily., Disp: , Rfl:   •  Cholecalciferol (VITAMIN D) 2000 UNITS capsule, Take 2,000 Units by mouth daily., Disp: , Rfl:   •  fluticasone (FLONASE) 50 MCG/ACT nasal spray, 2 sprays into the nostril(s) as directed by provider Daily., Disp: 16 g, Rfl: 11  •  Loratadine (CLARITIN PO), Take  by mouth Daily., Disp: , Rfl:   •  olopatadine (PATADAY) 0.2 % solution ophthalmic solution, Administer 1 drop to both eyes Daily., Disp: 2.5 mL, Rfl: 11  •  raloxifene (EVISTA) 60 MG tablet, Take 1 tablet by mouth Daily., Disp: 90 tablet, Rfl: 3  •  rosuvastatin (CRESTOR) 20 MG tablet, Take 1 tablet by mouth Daily., Disp: 90 tablet, Rfl: 3  •  Unable to find, 1 each Daily. Diferil, acne treatment, Disp: , Rfl:     Patient has been diagnosed with hyperlipidemia. Target LDL is < 100 mg/dl (CHD or \"CHD risk equivalent\" is present). Patient is on low fat diet with good compliance. Patient is compliant with treatment: daily use of Crestor (rozuvastatin). Side effects of medication: none. Exercise 3-4 days a week and aerobic exercise.    Patient had been diagnosed with Vitamin D deficiency. Takes over the counter Vit D3 2000 IU a day. Patient does not have complaints of muscle or bone aches. Balance is good. No recent falls.     The following portions of the patient's history were reviewed and updated as appropriate: allergies, current medications, past family history, past medical history, past social history, past surgical history and problem list.    Review of Systems   Constitutional: Negative for chills and fever.   Eyes: Positive for redness. Negative for pain.   Respiratory: Negative for cough and shortness of breath.  " "  Cardiovascular: Negative for chest pain and leg swelling.   Neurological: Negative for dizziness and headaches.       Objective   Physical Exam   Constitutional: She is oriented to person, place, and time. She appears well-developed.   HENT:   Head: Normocephalic and atraumatic.   Right Ear: Tympanic membrane, external ear and ear canal normal.   Left Ear: Tympanic membrane, external ear and ear canal normal.   Nose: Nose normal. Right sinus exhibits no maxillary sinus tenderness and no frontal sinus tenderness. Left sinus exhibits no maxillary sinus tenderness and no frontal sinus tenderness.   Mouth/Throat: Uvula is midline, oropharynx is clear and moist and mucous membranes are normal.   Eyes: Conjunctivae and EOM are normal. Pupils are equal, round, and reactive to light. Right eye exhibits no discharge. Left eye exhibits no discharge. No scleral icterus.   Neck: Neck supple. No JVD present.   Cardiovascular: Normal rate, regular rhythm and normal heart sounds. Exam reveals no gallop and no friction rub.   No murmur heard.  Pulmonary/Chest: Effort normal and breath sounds normal. She has no wheezes. She has no rales.   Musculoskeletal: She exhibits no edema.   Lymphadenopathy:     She has no cervical adenopathy.   Neurological: She is alert and oriented to person, place, and time. No cranial nerve deficit.   Skin: Skin is warm and dry. No rash noted.   Psychiatric: She has a normal mood and affect. Her behavior is normal.   Vitals reviewed.      Assessment/Plan   Chris was seen today for hyperlipidemia and vitamin d deficiency.    Diagnoses and all orders for this visit:    Pure hypercholesterolemia  -     ezetimibe (ZETIA) 10 MG tablet; Take 1 tablet by mouth Daily.  -     CK; Future  -     Comprehensive Metabolic Panel; Future  -     Lipid Panel; Future    Vitamin D deficiency      Hyperlipidemia - not well contrlled with statin. Normal liver function tests. LDL target is below < 100 mg/dl (CHD or \"CHD risk " "equivalent\" is present). Patient's 141 on high dose of Crestor, will add Zetia 10 mg a day. Reevaluate in 2 months. Regular exercise recommended.  Vitamin D deficiency - well compensated with over the counter Vit D3. Continue same dose. This supplement is fat-soluble and has to be taken with meals.           "

## 2019-04-30 ENCOUNTER — LAB (OUTPATIENT)
Dept: INTERNAL MEDICINE | Facility: CLINIC | Age: 64
End: 2019-04-30

## 2019-04-30 DIAGNOSIS — E78.00 PURE HYPERCHOLESTEROLEMIA: ICD-10-CM

## 2019-04-30 LAB
ALBUMIN SERPL-MCNC: 4.4 G/DL (ref 3.5–5.2)
ALBUMIN/GLOB SERPL: 1.6 G/DL
ALP SERPL-CCNC: 59 U/L (ref 39–117)
ALT SERPL-CCNC: 15 U/L (ref 1–33)
AST SERPL-CCNC: 15 U/L (ref 1–32)
BILIRUB SERPL-MCNC: 0.5 MG/DL (ref 0.2–1.2)
BUN SERPL-MCNC: 16 MG/DL (ref 8–23)
BUN/CREAT SERPL: 21.1 (ref 7–25)
CALCIUM SERPL-MCNC: 9.5 MG/DL (ref 8.6–10.5)
CHLORIDE SERPL-SCNC: 104 MMOL/L (ref 98–107)
CHOLEST SERPL-MCNC: 184 MG/DL (ref 0–200)
CK SERPL-CCNC: 76 U/L (ref 20–180)
CO2 SERPL-SCNC: 23.3 MMOL/L (ref 22–29)
CREAT SERPL-MCNC: 0.76 MG/DL (ref 0.57–1)
GLOBULIN SER CALC-MCNC: 2.8 GM/DL
GLUCOSE SERPL-MCNC: 104 MG/DL (ref 65–99)
HDLC SERPL-MCNC: 48 MG/DL (ref 40–60)
LDLC SERPL CALC-MCNC: 116 MG/DL (ref 0–100)
POTASSIUM SERPL-SCNC: 4.5 MMOL/L (ref 3.5–5.2)
PROT SERPL-MCNC: 7.2 G/DL (ref 6–8.5)
SODIUM SERPL-SCNC: 140 MMOL/L (ref 136–145)
TRIGL SERPL-MCNC: 98 MG/DL (ref 0–150)
VLDLC SERPL-MCNC: 19.6 MG/DL

## 2019-04-30 PROCEDURE — 82550 ASSAY OF CK (CPK): CPT | Performed by: INTERNAL MEDICINE

## 2019-05-09 ENCOUNTER — OFFICE VISIT (OUTPATIENT)
Dept: INTERNAL MEDICINE | Facility: CLINIC | Age: 64
End: 2019-05-09

## 2019-05-09 VITALS
RESPIRATION RATE: 14 BRPM | SYSTOLIC BLOOD PRESSURE: 120 MMHG | BODY MASS INDEX: 25.95 KG/M2 | DIASTOLIC BLOOD PRESSURE: 74 MMHG | HEIGHT: 64 IN | WEIGHT: 152 LBS

## 2019-05-09 DIAGNOSIS — E78.00 PURE HYPERCHOLESTEROLEMIA: Primary | ICD-10-CM

## 2019-05-09 DIAGNOSIS — R73.01 IMPAIRED FASTING BLOOD SUGAR: ICD-10-CM

## 2019-05-09 DIAGNOSIS — E55.9 VITAMIN D DEFICIENCY: ICD-10-CM

## 2019-05-09 PROCEDURE — 99213 OFFICE O/P EST LOW 20 MIN: CPT | Performed by: INTERNAL MEDICINE

## 2019-05-09 RX ORDER — EZETIMIBE 10 MG/1
10 TABLET ORAL DAILY
Qty: 90 TABLET | Refills: 3 | Status: SHIPPED | OUTPATIENT
Start: 2019-05-09 | End: 2020-05-12

## 2019-05-09 NOTE — PROGRESS NOTES
"Subjective   hCris Moreno is a 63 y.o. female.     History of Present Illness   Chris Moreno 63 y.o. female presents today for hyperlipidemia f/u. Last was seen on 02/28/2019 and on that visit medication was changed due to inadequate control.We had added Zetia 10 mg.  Patient is compliant with treatment and denies  side effects. Patient reports no change in symptoms with medication change.    /74 (BP Location: Left arm, Patient Position: Sitting, Cuff Size: Adult)   Resp 14   Ht 161.9 cm (63.75\")   Wt 68.9 kg (152 lb)   BMI 26.30 kg/m²     Current Outpatient Medications:   •  Calcium Citrate (CITRACAL PO), Take  by mouth Daily., Disp: , Rfl:   •  Cholecalciferol (VITAMIN D) 2000 UNITS capsule, Take 2,000 Units by mouth daily., Disp: , Rfl:   •  ezetimibe (ZETIA) 10 MG tablet, Take 1 tablet by mouth Daily., Disp: 30 tablet, Rfl: 11  •  fluticasone (FLONASE) 50 MCG/ACT nasal spray, 2 sprays into the nostril(s) as directed by provider Daily., Disp: 16 g, Rfl: 11  •  Loratadine (CLARITIN PO), Take  by mouth Daily., Disp: , Rfl:   •  olopatadine (PATADAY) 0.2 % solution ophthalmic solution, Administer 1 drop to both eyes Daily., Disp: 2.5 mL, Rfl: 11  •  raloxifene (EVISTA) 60 MG tablet, Take 1 tablet by mouth Daily., Disp: 90 tablet, Rfl: 3  •  rosuvastatin (CRESTOR) 20 MG tablet, Take 1 tablet by mouth Daily., Disp: 90 tablet, Rfl: 3  •  Unable to find, 1 each Daily. Diferil, acne treatment, Disp: , Rfl:   The following portions of the patient's history were reviewed and updated as appropriate: allergies, current medications, past family history, past medical history, past social history, past surgical history and problem list.    Review of Systems   Constitutional: Negative for chills and fever.   Eyes: Negative for pain and redness.   Respiratory: Negative for cough and shortness of breath.    Cardiovascular: Negative for chest pain and leg swelling.   Neurological: Negative for dizziness and headaches. "       Objective   Physical Exam   Constitutional: She is oriented to person, place, and time. She appears well-developed and well-nourished.   HENT:   Head: Normocephalic and atraumatic.   Right Ear: Tympanic membrane, external ear and ear canal normal.   Left Ear: Tympanic membrane, external ear and ear canal normal.   Nose: Nose normal. Right sinus exhibits no maxillary sinus tenderness and no frontal sinus tenderness. Left sinus exhibits no maxillary sinus tenderness and no frontal sinus tenderness.   Mouth/Throat: Uvula is midline, oropharynx is clear and moist and mucous membranes are normal.   Eyes: Conjunctivae and EOM are normal. Pupils are equal, round, and reactive to light. Right eye exhibits no discharge. Left eye exhibits no discharge. No scleral icterus.   Neck: Neck supple. No JVD present.   Cardiovascular: Normal rate, regular rhythm and normal heart sounds. Exam reveals no gallop and no friction rub.   No murmur heard.  Pulmonary/Chest: Effort normal and breath sounds normal. She has no wheezes. She has no rales.   Musculoskeletal: She exhibits no edema.   Lymphadenopathy:     She has no cervical adenopathy.   Neurological: She is alert and oriented to person, place, and time. No cranial nerve deficit.   Skin: Skin is warm and dry. No rash noted.   Psychiatric: She has a normal mood and affect. Her behavior is normal.   Vitals reviewed.      Assessment/Plan   Chris was seen today for hyperlipidemia.    Diagnoses and all orders for this visit:    Pure hypercholesterolemia  -     CBC & Differential; Future  -     Comprehensive Metabolic Panel; Future  -     Lipid Panel; Future  -     TSH; Future  -     ezetimibe (ZETIA) 10 MG tablet; Take 1 tablet by mouth Daily.    Impaired fasting blood sugar    Vitamin D deficiency  -     Vitamin D 25 Hydroxy; Future      Hyperlipidemia - well controlled with combination of statin and Zetia. Normal liver function tests. LDL target is below < 130 mg/dl (2 or more  risk factors are present). Patient's 116. Continue same treatment. Regular exercise recommended.  Elevated fasting blood sugar. Low carb diet and regular exercise recommended. Weight loss will be very beneficial.

## 2019-05-09 NOTE — PATIENT INSTRUCTIONS
Hyperlipidemia - well controlled with combination of statin and Zetia. Normal liver function tests. LDL target is below < 130 mg/dl (2 or more risk factors are present). Patient's 116. Continue same treatment. Regular exercise recommended.  Elevated fasting blood sugar. Low carb diet and regular exercise recommended. Weight loss will be very beneficial.

## 2019-07-17 DIAGNOSIS — Z12.31 VISIT FOR SCREENING MAMMOGRAM: Primary | ICD-10-CM

## 2019-08-01 DIAGNOSIS — Z12.31 VISIT FOR SCREENING MAMMOGRAM: ICD-10-CM

## 2019-08-23 ENCOUNTER — LAB (OUTPATIENT)
Dept: INTERNAL MEDICINE | Facility: CLINIC | Age: 64
End: 2019-08-23

## 2019-08-23 DIAGNOSIS — E55.9 VITAMIN D DEFICIENCY: ICD-10-CM

## 2019-08-23 DIAGNOSIS — E78.00 PURE HYPERCHOLESTEROLEMIA: ICD-10-CM

## 2019-08-23 LAB
25(OH)D3 SERPL-MCNC: 42.2 NG/ML (ref 30–100)
ALBUMIN SERPL-MCNC: 4.2 G/DL (ref 3.5–5.2)
ALBUMIN/GLOB SERPL: 1.6 G/DL
ALP SERPL-CCNC: 54 U/L (ref 39–117)
ALT SERPL W P-5'-P-CCNC: 16 U/L (ref 1–33)
ANION GAP SERPL CALCULATED.3IONS-SCNC: 12.7 MMOL/L (ref 5–15)
AST SERPL-CCNC: 20 U/L (ref 1–32)
BASOPHILS # BLD AUTO: 0.01 10*3/MM3 (ref 0–0.2)
BASOPHILS NFR BLD AUTO: 0.2 % (ref 0–1.5)
BILIRUB SERPL-MCNC: 0.4 MG/DL (ref 0.2–1.2)
BUN BLD-MCNC: 14 MG/DL (ref 8–23)
BUN/CREAT SERPL: 18.2 (ref 7–25)
CALCIUM SPEC-SCNC: 8.2 MG/DL (ref 8.6–10.5)
CHLORIDE SERPL-SCNC: 106 MMOL/L (ref 98–107)
CHOLEST SERPL-MCNC: 158 MG/DL (ref 0–200)
CO2 SERPL-SCNC: 23.3 MMOL/L (ref 22–29)
CREAT BLD-MCNC: 0.77 MG/DL (ref 0.57–1)
DEPRECATED RDW RBC AUTO: 40.9 FL (ref 37–54)
EOSINOPHIL # BLD AUTO: 0.14 10*3/MM3 (ref 0–0.4)
EOSINOPHIL NFR BLD AUTO: 2.3 % (ref 0.3–6.2)
ERYTHROCYTE [DISTWIDTH] IN BLOOD BY AUTOMATED COUNT: 12.6 % (ref 12.3–15.4)
GFR SERPL CREATININE-BSD FRML MDRD: 75 ML/MIN/1.73
GLOBULIN UR ELPH-MCNC: 2.6 GM/DL
GLUCOSE BLD-MCNC: 98 MG/DL (ref 65–99)
HCT VFR BLD AUTO: 38.8 % (ref 34–46.6)
HDLC SERPL-MCNC: 40 MG/DL (ref 40–60)
HGB BLD-MCNC: 13.1 G/DL (ref 12–15.9)
LDLC SERPL CALC-MCNC: 93 MG/DL (ref 0–100)
LDLC/HDLC SERPL: 2.32 {RATIO}
LYMPHOCYTES # BLD AUTO: 2.4 10*3/MM3 (ref 0.7–3.1)
LYMPHOCYTES NFR BLD AUTO: 39.7 % (ref 19.6–45.3)
MCH RBC QN AUTO: 31 PG (ref 26.6–33)
MCHC RBC AUTO-ENTMCNC: 33.8 G/DL (ref 31.5–35.7)
MCV RBC AUTO: 91.9 FL (ref 79–97)
MONOCYTES # BLD AUTO: 0.44 10*3/MM3 (ref 0.1–0.9)
MONOCYTES NFR BLD AUTO: 7.3 % (ref 5–12)
NEUTROPHILS # BLD AUTO: 3.06 10*3/MM3 (ref 1.7–7)
NEUTROPHILS NFR BLD AUTO: 50.5 % (ref 42.7–76)
PLATELET # BLD AUTO: 223 10*3/MM3 (ref 140–450)
PMV BLD AUTO: 11.6 FL (ref 6–12)
POTASSIUM BLD-SCNC: 4 MMOL/L (ref 3.5–5.2)
PROT SERPL-MCNC: 6.8 G/DL (ref 6–8.5)
RBC # BLD AUTO: 4.22 10*6/MM3 (ref 3.77–5.28)
SODIUM BLD-SCNC: 142 MMOL/L (ref 136–145)
TRIGL SERPL-MCNC: 126 MG/DL (ref 0–150)
TSH SERPL DL<=0.05 MIU/L-ACNC: 3.64 MIU/ML (ref 0.27–4.2)
VLDLC SERPL-MCNC: 25.2 MG/DL
WBC NRBC COR # BLD: 6.05 10*3/MM3 (ref 3.4–10.8)

## 2019-08-23 PROCEDURE — 80061 LIPID PANEL: CPT | Performed by: INTERNAL MEDICINE

## 2019-08-23 PROCEDURE — 36415 COLL VENOUS BLD VENIPUNCTURE: CPT | Performed by: INTERNAL MEDICINE

## 2019-08-23 PROCEDURE — 80053 COMPREHEN METABOLIC PANEL: CPT | Performed by: INTERNAL MEDICINE

## 2019-08-23 PROCEDURE — 82306 VITAMIN D 25 HYDROXY: CPT | Performed by: INTERNAL MEDICINE

## 2019-08-23 PROCEDURE — 84443 ASSAY THYROID STIM HORMONE: CPT | Performed by: INTERNAL MEDICINE

## 2019-08-23 PROCEDURE — 85025 COMPLETE CBC W/AUTO DIFF WBC: CPT | Performed by: INTERNAL MEDICINE

## 2019-08-26 ENCOUNTER — OFFICE VISIT (OUTPATIENT)
Dept: INTERNAL MEDICINE | Facility: CLINIC | Age: 64
End: 2019-08-26

## 2019-08-26 VITALS
WEIGHT: 154 LBS | SYSTOLIC BLOOD PRESSURE: 116 MMHG | RESPIRATION RATE: 14 BRPM | BODY MASS INDEX: 26.29 KG/M2 | HEIGHT: 64 IN | DIASTOLIC BLOOD PRESSURE: 70 MMHG

## 2019-08-26 DIAGNOSIS — Z00.00 HEALTH CARE MAINTENANCE: Primary | ICD-10-CM

## 2019-08-26 DIAGNOSIS — E55.9 VITAMIN D DEFICIENCY: ICD-10-CM

## 2019-08-26 DIAGNOSIS — E78.00 PURE HYPERCHOLESTEROLEMIA: ICD-10-CM

## 2019-08-26 DIAGNOSIS — Z78.0 POST-MENOPAUSE: ICD-10-CM

## 2019-08-26 DIAGNOSIS — Z23 NEED FOR 23-POLYVALENT PNEUMOCOCCAL POLYSACCHARIDE VACCINE: ICD-10-CM

## 2019-08-26 PROCEDURE — 90732 PPSV23 VACC 2 YRS+ SUBQ/IM: CPT | Performed by: INTERNAL MEDICINE

## 2019-08-26 PROCEDURE — G0009 ADMIN PNEUMOCOCCAL VACCINE: HCPCS | Performed by: INTERNAL MEDICINE

## 2019-08-26 PROCEDURE — G0439 PPPS, SUBSEQ VISIT: HCPCS | Performed by: INTERNAL MEDICINE

## 2019-08-26 RX ORDER — ROSUVASTATIN CALCIUM 20 MG/1
20 TABLET, COATED ORAL DAILY
Qty: 90 TABLET | Refills: 3 | Status: SHIPPED | OUTPATIENT
Start: 2019-08-26 | End: 2020-08-31 | Stop reason: SDUPTHER

## 2019-08-26 RX ORDER — UBIDECARENONE 200 MG
CAPSULE ORAL
COMMUNITY
Start: 2019-06-01 | End: 2021-09-24

## 2019-08-26 RX ORDER — CETIRIZINE HYDROCHLORIDE 10 MG/1
TABLET ORAL
COMMUNITY
Start: 2019-08-01

## 2019-08-26 NOTE — PROGRESS NOTES
"Subjective   Chris Moreno is a 63 y.o. female.     History of Present Illness   /70 (BP Location: Left arm, Patient Position: Sitting, Cuff Size: Adult)   Resp 14   Ht 161.9 cm (63.75\")   Wt 69.9 kg (154 lb)   BMI 26.64 kg/m²   Patient is being seen for subsequent wellness visit.  Hospitalizations in a past: 3, all surgical, none last year  Once per lifetime Medicare screening tests: ECG - 08/20/2018, nl    AAA risk assessment: 1. FH: none. 2.H/o tobacco smoking: none. 3. CV or PAD: none.    Tobacco use: none   Alcohol use: seldom  Illicit drugs use: none  Diet: well balanced  Exercise: once a week and water aerobics    Anxiety screening: How many days in the last 2 weeks you were  1. Feeling anxious, nervous, on edge: none  2. Unable to stop worrying: none  3. Worrying too much about different things: none  4. Having problems relaxing:none  5. Feeling restless or unable to sit still:none  6. Feeling irritable or easely annoyed: none  7. Being afraid that something awful might happen: none    Depression screening PHQ9:  Over the past 2 weeks how often have you been bothered by  1. Lack of interest or pleasuer in usual activities: none  2. Feeling down, depressed, hopeless:none  3. Troubles falling asleep/sleeping too long:none  4. Feeling tired, having little energy: none  5. Poor appetite or overeating: none  6. Feeling bad about yourself:none.  7. Trouble concentrating: at the baseline.  8. Moving or speaking too slow or much faster than usual: none  9. Thoughts about harming yourself:none.    Cognitive impairment screening:   Do you have difficulties with:  1. Language: no  2. Behavior: no  3. Learning/retaining new information: no  4. Handling complex tasks: no  5. Reasoning: no  6. Spacial ability and orientation: no    Hearing: normal.  Driving: unrestricted  ADL: independent  ADL details: Does patient needs help with:  telephone use: no  Transportation: no  Housework: no  Shopping: no  meal " preparation: no  laundry: no  managing medication:no  Participate in the social activities: Y    Fall risk factors:   1.Use of alcohol: no    2.Polypharmacy: no  3.H/o of previous fall:  no  4. Mobility impairment:  no  5. Visual impairment:  no  6. Deconditioning: no  7. Use of antihypertensive medication:  no  8. Use of sedatives: no  9. Use of antidepressant: no    Home safety:   1.loose rugs: no   2.unfamiliar environment: no   3. Uneven floors: no   4. No grab bars in the bathroom: no  5. No banister on stairs: no      Advanced directives: has Living Will. Discussed. I agree with patient wishes and decision..    Co-managers: ophthalmology , dermatology , GYN , vascular surgery , [place], general surgeon Enoch Galan, oncologist                                           The following portions of the patient's history were reviewed and updated as appropriate: allergies, current medications, past family history, past medical history, past social history, past surgical history and problem list.    Review of Systems   Constitutional: Negative for chills and fever.   HENT: Negative for postnasal drip, sinus pressure and sore throat.    Eyes: Negative for pain and itching.   Respiratory: Negative for cough and chest tightness.    Cardiovascular: Negative for chest pain and leg swelling.   Gastrointestinal: Negative for abdominal pain and blood in stool.   Endocrine: Negative for cold intolerance and heat intolerance.   Genitourinary: Negative for difficulty urinating and flank pain.   Musculoskeletal: Negative for back pain and neck pain.   Skin: Negative for color change and rash.   Neurological: Negative for dizziness, weakness and headaches.   Hematological: Negative for adenopathy. Does not bruise/bleed easily.   Psychiatric/Behavioral: Negative for sleep disturbance. The patient is not nervous/anxious.        Objective   Physical Exam   Constitutional: She is oriented  to person, place, and time. She appears well-developed. No distress.   HENT:   Head: Normocephalic and atraumatic.   Right Ear: External ear normal.   Left Ear: External ear normal.   Nose: Nose normal.   Mouth/Throat: Oropharynx is clear and moist. No oropharyngeal exudate.   Eyes: Conjunctivae and EOM are normal. Pupils are equal, round, and reactive to light. Right eye exhibits no discharge. Left eye exhibits no discharge. No scleral icterus.   Neck: Normal range of motion. Neck supple. No JVD present. No thyromegaly present.   Cardiovascular: Normal rate, regular rhythm, S1 normal, S2 normal, normal heart sounds and intact distal pulses. Exam reveals no gallop and no friction rub.   No murmur heard.  Pulmonary/Chest: Effort normal and breath sounds normal. No respiratory distress. She has no decreased breath sounds. She has no wheezes. She has no rhonchi. She has no rales. Right breast exhibits no inverted nipple, no mass, no nipple discharge, no skin change and no tenderness. Left breast exhibits no inverted nipple, no mass, no nipple discharge, no skin change and no tenderness. Breasts are symmetrical.   Abdominal: Soft. Bowel sounds are normal. She exhibits no distension and no mass. There is no tenderness. There is no rebound and no guarding.   Musculoskeletal: She exhibits no edema.   Lymphadenopathy:        Head (right side): No submental, no submandibular, no preauricular, no posterior auricular and no occipital adenopathy present.        Head (left side): No submental, no submandibular, no preauricular, no posterior auricular and no occipital adenopathy present.     She has no cervical adenopathy.        Right cervical: No superficial cervical, no deep cervical and no posterior cervical adenopathy present.       Left cervical: No superficial cervical, no deep cervical and no posterior cervical adenopathy present.     She has no axillary adenopathy.        Right: No supraclavicular adenopathy present.         Left: No supraclavicular adenopathy present.   Neurological: She is alert and oriented to person, place, and time. She has normal reflexes. She displays normal reflexes. No cranial nerve deficit. She exhibits normal muscle tone. Coordination normal.   Reflex Scores:       Bicep reflexes are 2+ on the right side and 2+ on the left side.       Patellar reflexes are 2+ on the right side and 2+ on the left side.  Skin: Skin is warm. No rash noted. She is not diaphoretic. No erythema.   Psychiatric: She has a normal mood and affect. Her behavior is normal. Thought content normal.   Vitals reviewed.      Assessment/Plan   Chris was seen today for medicare wellness-subsequent, hyperlipidemia and vitamin d deficiency.    Diagnoses and all orders for this visit:    Health care maintenance    Pure hypercholesterolemia  -     rosuvastatin (CRESTOR) 20 MG tablet; Take 1 tablet by mouth Daily.  -     Comprehensive Metabolic Panel; Future  -     Lipid Panel; Future    Vitamin D deficiency  -     Comprehensive Metabolic Panel; Future  -     Vitamin D 25 Hydroxy; Future    Need for 23-polyvalent pneumococcal polysaccharide vaccine  -     Pneumococcal Polysaccharide Vaccine 23-Valent Greater Than or Equal To 1yo Subcutaneous / IM    Post-menopause  -     DEXA Bone Density Axial; Future        Annual wellness visit with preventive exam as well as age and risk appropriate councelling completed.    Cardiovascular disease councelling: current. Recommended: Excellent cholesterol.  Diabetes screening and councelling: current. Recommended: Low carb diet recommended. Needs to avoid starches and sweets., Regular aerobic exercise recommended.  Breast cancer screening: up to date. Recommended every year..  Osteoporosis screening: is due, will schedule. Benefits explained..  Glaucoma screening: up to date.   AAA screening: not needed..  Hep C screening (born between [birth year]75) completed..  Colorectal cancer screening: up to date. Recommended  "every 10 years. Next screening is due in 2026 - we might do Cologuard next time...    Immunizations:   1. Influenza vaccine  is up to date and recommended yearly.   2. Pneumococcal vaccines: Pneumovaccine will be administered today.   3. Shingles prevention:  Zostavax completed, recommended to get new shingles vaccine Shingrix at the pharmacy, benefits explained.      Advanced directives planning: has Living Will. Discussed. I agree with patient wishes and decision..    Medications reviewed and medication list updated.   Potential harmful drug-disease interactions in the elderly:none.  High risk medication in elderly: none  ASA use: no indications.      Hyperlipidemia - well controlled with statin and Zetia. Normal liver function tests. LDL target is below < 100 mg/dl (CHD or \"CHD risk equivalent\" is present). Patient's 93. Continue same treatment. Regular exercise recommended.  Vitamin D deficiency - well compensated with over the counter Vit D3. Continue same dose. This supplement is fat-soluble and has to be taken with meals.       "

## 2019-08-26 NOTE — PATIENT INSTRUCTIONS

## 2019-10-23 DIAGNOSIS — M85.80 OSTEOPENIA, UNSPECIFIED LOCATION: ICD-10-CM

## 2019-10-23 RX ORDER — RALOXIFENE HYDROCHLORIDE 60 MG/1
TABLET, FILM COATED ORAL
Qty: 90 TABLET | Refills: 3 | Status: SHIPPED | OUTPATIENT
Start: 2019-10-23 | End: 2021-03-01

## 2019-11-07 ENCOUNTER — APPOINTMENT (OUTPATIENT)
Dept: LAB | Facility: HOSPITAL | Age: 64
End: 2019-11-07

## 2019-11-07 ENCOUNTER — OFFICE VISIT (OUTPATIENT)
Dept: ONCOLOGY | Facility: CLINIC | Age: 64
End: 2019-11-07

## 2019-11-07 VITALS
OXYGEN SATURATION: 96 % | DIASTOLIC BLOOD PRESSURE: 81 MMHG | HEIGHT: 63 IN | BODY MASS INDEX: 27.09 KG/M2 | HEART RATE: 73 BPM | RESPIRATION RATE: 16 BRPM | TEMPERATURE: 97.9 F | SYSTOLIC BLOOD PRESSURE: 133 MMHG | WEIGHT: 152.9 LBS

## 2019-11-07 DIAGNOSIS — Z85.820 HISTORY OF MELANOMA: ICD-10-CM

## 2019-11-07 DIAGNOSIS — Z85.3 HISTORY OF BREAST CANCER: Primary | ICD-10-CM

## 2019-11-07 DIAGNOSIS — M35.01 KERATOCONJUNCTIVITIS SICCA (HCC): Primary | ICD-10-CM

## 2019-11-07 LAB
ALBUMIN SERPL-MCNC: 4.5 G/DL (ref 3.5–5.2)
ALBUMIN/GLOB SERPL: 1.5 G/DL (ref 1.1–2.4)
ALP SERPL-CCNC: 64 U/L (ref 38–116)
ALT SERPL W P-5'-P-CCNC: 15 U/L (ref 0–33)
ANION GAP SERPL CALCULATED.3IONS-SCNC: 14.6 MMOL/L (ref 5–15)
AST SERPL-CCNC: 19 U/L (ref 0–32)
BASOPHILS # BLD AUTO: 0.05 10*3/MM3 (ref 0–0.2)
BASOPHILS NFR BLD AUTO: 0.9 % (ref 0–1.5)
BILIRUB SERPL-MCNC: 0.4 MG/DL (ref 0.2–1.2)
BUN BLD-MCNC: 16 MG/DL (ref 6–20)
BUN/CREAT SERPL: 23.9 (ref 7.3–30)
CALCIUM SPEC-SCNC: 9.4 MG/DL (ref 8.5–10.2)
CHLORIDE SERPL-SCNC: 103 MMOL/L (ref 98–107)
CO2 SERPL-SCNC: 23.4 MMOL/L (ref 22–29)
CREAT BLD-MCNC: 0.67 MG/DL (ref 0.6–1.1)
DEPRECATED RDW RBC AUTO: 42.9 FL (ref 37–54)
EOSINOPHIL # BLD AUTO: 0.15 10*3/MM3 (ref 0–0.4)
EOSINOPHIL NFR BLD AUTO: 2.6 % (ref 0.3–6.2)
ERYTHROCYTE [DISTWIDTH] IN BLOOD BY AUTOMATED COUNT: 12.5 % (ref 12.3–15.4)
GFR SERPL CREATININE-BSD FRML MDRD: 88 ML/MIN/1.73
GLOBULIN UR ELPH-MCNC: 3.1 GM/DL (ref 1.8–3.5)
GLUCOSE BLD-MCNC: 89 MG/DL (ref 74–124)
HCT VFR BLD AUTO: 41.8 % (ref 34–46.6)
HGB BLD-MCNC: 14 G/DL (ref 12–15.9)
IMM GRANULOCYTES # BLD AUTO: 0.01 10*3/MM3 (ref 0–0.05)
IMM GRANULOCYTES NFR BLD AUTO: 0.2 % (ref 0–0.5)
LYMPHOCYTES # BLD AUTO: 2.68 10*3/MM3 (ref 0.7–3.1)
LYMPHOCYTES NFR BLD AUTO: 46 % (ref 19.6–45.3)
MCH RBC QN AUTO: 31 PG (ref 26.6–33)
MCHC RBC AUTO-ENTMCNC: 33.5 G/DL (ref 31.5–35.7)
MCV RBC AUTO: 92.7 FL (ref 79–97)
MONOCYTES # BLD AUTO: 0.37 10*3/MM3 (ref 0.1–0.9)
MONOCYTES NFR BLD AUTO: 6.4 % (ref 5–12)
NEUTROPHILS # BLD AUTO: 2.56 10*3/MM3 (ref 1.7–7)
NEUTROPHILS NFR BLD AUTO: 43.9 % (ref 42.7–76)
NRBC BLD AUTO-RTO: 0 /100 WBC (ref 0–0.2)
PLATELET # BLD AUTO: 256 10*3/MM3 (ref 140–450)
PMV BLD AUTO: 11.3 FL (ref 6–12)
POTASSIUM BLD-SCNC: 4.1 MMOL/L (ref 3.5–4.7)
PROT SERPL-MCNC: 7.6 G/DL (ref 6.3–8)
RBC # BLD AUTO: 4.51 10*6/MM3 (ref 3.77–5.28)
SODIUM BLD-SCNC: 141 MMOL/L (ref 134–145)
WBC NRBC COR # BLD: 5.82 10*3/MM3 (ref 3.4–10.8)

## 2019-11-07 PROCEDURE — 99213 OFFICE O/P EST LOW 20 MIN: CPT | Performed by: INTERNAL MEDICINE

## 2019-11-07 PROCEDURE — 36415 COLL VENOUS BLD VENIPUNCTURE: CPT

## 2019-11-07 PROCEDURE — 85025 COMPLETE CBC W/AUTO DIFF WBC: CPT

## 2019-11-07 PROCEDURE — G0463 HOSPITAL OUTPT CLINIC VISIT: HCPCS | Performed by: INTERNAL MEDICINE

## 2019-11-07 PROCEDURE — 80053 COMPREHEN METABOLIC PANEL: CPT

## 2020-01-12 DIAGNOSIS — J30.9 ALLERGIC RHINITIS, UNSPECIFIED SEASONALITY, UNSPECIFIED TRIGGER: ICD-10-CM

## 2020-01-13 RX ORDER — OLOPATADINE HYDROCHLORIDE 2 MG/ML
1 SOLUTION/ DROPS OPHTHALMIC DAILY
Qty: 2.5 ML | Refills: 11 | Status: SHIPPED | OUTPATIENT
Start: 2020-01-13

## 2020-02-19 ENCOUNTER — CLINICAL SUPPORT (OUTPATIENT)
Dept: INTERNAL MEDICINE | Facility: CLINIC | Age: 65
End: 2020-02-19

## 2020-02-19 ENCOUNTER — LAB (OUTPATIENT)
Dept: INTERNAL MEDICINE | Facility: CLINIC | Age: 65
End: 2020-02-19

## 2020-02-19 DIAGNOSIS — Z78.0 POST-MENOPAUSE: ICD-10-CM

## 2020-02-19 DIAGNOSIS — E78.00 PURE HYPERCHOLESTEROLEMIA: ICD-10-CM

## 2020-02-19 DIAGNOSIS — E55.9 VITAMIN D DEFICIENCY: ICD-10-CM

## 2020-02-19 LAB
25(OH)D3 SERPL-MCNC: 41.6 NG/ML (ref 30–100)
ALBUMIN SERPL-MCNC: 3.8 G/DL (ref 3.5–5.2)
ALBUMIN/GLOB SERPL: 1.2 G/DL
ALP SERPL-CCNC: 57 U/L (ref 39–117)
ALT SERPL W P-5'-P-CCNC: 12 U/L (ref 1–33)
ANION GAP SERPL CALCULATED.3IONS-SCNC: 14 MMOL/L (ref 5–15)
AST SERPL-CCNC: 15 U/L (ref 1–32)
BILIRUB SERPL-MCNC: 0.5 MG/DL (ref 0.2–1.2)
BUN BLD-MCNC: 17 MG/DL (ref 8–23)
BUN/CREAT SERPL: 22.7 (ref 7–25)
CALCIUM SPEC-SCNC: 9.2 MG/DL (ref 8.6–10.5)
CHLORIDE SERPL-SCNC: 106 MMOL/L (ref 98–107)
CHOLEST SERPL-MCNC: 179 MG/DL (ref 0–200)
CO2 SERPL-SCNC: 22 MMOL/L (ref 22–29)
CREAT BLD-MCNC: 0.75 MG/DL (ref 0.57–1)
GFR SERPL CREATININE-BSD FRML MDRD: 77 ML/MIN/1.73
GLOBULIN UR ELPH-MCNC: 3.1 GM/DL
GLUCOSE BLD-MCNC: 94 MG/DL (ref 65–99)
HDLC SERPL-MCNC: 46 MG/DL (ref 40–60)
LDLC SERPL CALC-MCNC: 109 MG/DL (ref 0–100)
LDLC/HDLC SERPL: 2.37 {RATIO}
POTASSIUM BLD-SCNC: 4.2 MMOL/L (ref 3.5–5.2)
PROT SERPL-MCNC: 6.9 G/DL (ref 6–8.5)
SODIUM BLD-SCNC: 142 MMOL/L (ref 136–145)
TRIGL SERPL-MCNC: 121 MG/DL (ref 0–150)
VLDLC SERPL-MCNC: 24.2 MG/DL (ref 5–40)

## 2020-02-19 PROCEDURE — 77080 DXA BONE DENSITY AXIAL: CPT | Performed by: INTERNAL MEDICINE

## 2020-02-19 PROCEDURE — 36415 COLL VENOUS BLD VENIPUNCTURE: CPT | Performed by: INTERNAL MEDICINE

## 2020-02-19 PROCEDURE — 80053 COMPREHEN METABOLIC PANEL: CPT | Performed by: INTERNAL MEDICINE

## 2020-02-19 PROCEDURE — 82306 VITAMIN D 25 HYDROXY: CPT | Performed by: INTERNAL MEDICINE

## 2020-02-19 PROCEDURE — 80061 LIPID PANEL: CPT | Performed by: INTERNAL MEDICINE

## 2020-03-10 ENCOUNTER — OFFICE VISIT (OUTPATIENT)
Dept: INTERNAL MEDICINE | Facility: CLINIC | Age: 65
End: 2020-03-10

## 2020-03-10 VITALS
RESPIRATION RATE: 14 BRPM | SYSTOLIC BLOOD PRESSURE: 108 MMHG | HEIGHT: 64 IN | WEIGHT: 154 LBS | BODY MASS INDEX: 26.29 KG/M2 | DIASTOLIC BLOOD PRESSURE: 62 MMHG

## 2020-03-10 DIAGNOSIS — M85.89 OSTEOPENIA OF MULTIPLE SITES: ICD-10-CM

## 2020-03-10 DIAGNOSIS — E78.00 PURE HYPERCHOLESTEROLEMIA: Primary | ICD-10-CM

## 2020-03-10 DIAGNOSIS — E55.9 VITAMIN D DEFICIENCY: ICD-10-CM

## 2020-03-10 PROCEDURE — 99214 OFFICE O/P EST MOD 30 MIN: CPT | Performed by: INTERNAL MEDICINE

## 2020-03-10 RX ORDER — ALENDRONATE SODIUM 70 MG/1
70 TABLET ORAL
Qty: 4 TABLET | Refills: 11 | Status: SHIPPED | OUTPATIENT
Start: 2020-03-10 | End: 2021-03-01 | Stop reason: SDUPTHER

## 2020-03-10 NOTE — PROGRESS NOTES
"Subjective   Chris Moreno is a 64 y.o. female. Here for hyperlipidemia, V it D deficiency and osteopenia per DEXA.    History of Present Illness     /62 (BP Location: Left arm, Patient Position: Sitting, Cuff Size: Adult)   Resp 14   Ht 161.9 cm (63.75\")   Wt 69.9 kg (154 lb)   BMI 26.64 kg/m²     Current Outpatient Medications:   •  Calcium Citrate (CITRACAL PO), Take  by mouth Daily., Disp: , Rfl:   •  cetirizine (ZYRTEC ALLERGY) 10 MG tablet, , Disp: , Rfl:   •  Cholecalciferol (VITAMIN D) 2000 UNITS capsule, Take 2,000 Units by mouth daily., Disp: , Rfl:   •  Coenzyme Q10 200 MG capsule, , Disp: , Rfl:   •  ezetimibe (ZETIA) 10 MG tablet, Take 1 tablet by mouth Daily., Disp: 90 tablet, Rfl: 3  •  fluticasone (FLONASE) 50 MCG/ACT nasal spray, 2 sprays into the nostril(s) as directed by provider Daily., Disp: 16 g, Rfl: 11  •  olopatadine (PATADAY) 0.2 % solution ophthalmic solution, ADMINISTER 1 DROP TO BOTH EYES DAILY., Disp: 2.5 mL, Rfl: 11  •  raloxifene (EVISTA) 60 MG tablet, TAKE 1 TABLET BY MOUTH EVERY DAY, Disp: 90 tablet, Rfl: 3  •  rosuvastatin (CRESTOR) 20 MG tablet, Take 1 tablet by mouth Daily., Disp: 90 tablet, Rfl: 3  •  Unable to find, 1 each Daily. Diferil, acne treatment, Disp: , Rfl:     Patient has been diagnosed with hyperlipidemia. Target LDL is < 100 mg/dl (CHD or \"CHD risk equivalent\" is present). Patient is on low fat diet with good compliance. Patient is compliant with treatment: daily use of Crestor (rozuvastatin) and Zetia. Side effects of medication: none. Exercise 2 times a week and aerobic exercise.    Patient had been diagnosed with Vitamin D deficiency. Takes Vit D as a part of MVIs. Patient does not have complaints of muscle or bone aches. Balance is good. No recent falls.     Patient is here for osteopenia f/u. There had been significant decline in her bone density: last DEXA had shown T score of the spine -1.9 (was -1.6 in 2017), hip -1.4 (was -1,3 in 2017), and " femoral neck T score -2/3 (down from -2.0 in 2017). Patient is on Evista, that she had started in 2006, after breast cancer diagnosis. God compliance. Exercise: some walking and aqua aerobics.      The following portions of the patient's history were reviewed and updated as appropriate: allergies, current medications, past family history, past medical history, past social history, past surgical history and problem list.    Review of Systems   Constitutional: Negative for chills and fever.   Eyes: Negative for pain and redness.   Respiratory: Negative for cough and shortness of breath.    Cardiovascular: Negative for chest pain and leg swelling.   Neurological: Negative for dizziness and headaches.       Objective   Physical Exam   Constitutional: She is oriented to person, place, and time. She appears well-developed and well-nourished.   HENT:   Head: Normocephalic and atraumatic.   Right Ear: Tympanic membrane, external ear and ear canal normal.   Left Ear: Tympanic membrane, external ear and ear canal normal.   Nose: Nose normal. Right sinus exhibits no maxillary sinus tenderness and no frontal sinus tenderness. Left sinus exhibits no maxillary sinus tenderness and no frontal sinus tenderness.   Mouth/Throat: Uvula is midline, oropharynx is clear and moist and mucous membranes are normal.   Eyes: Pupils are equal, round, and reactive to light. Conjunctivae and EOM are normal. Right eye exhibits no discharge. Left eye exhibits no discharge. No scleral icterus.   Neck: Neck supple. No JVD present.   Cardiovascular: Normal rate, regular rhythm and normal heart sounds. Exam reveals no gallop and no friction rub.   No murmur heard.  Pulmonary/Chest: Effort normal and breath sounds normal. She has no wheezes. She has no rales.   Musculoskeletal: She exhibits no edema.   Lymphadenopathy:     She has no cervical adenopathy.   Neurological: She is alert and oriented to person, place, and time. No cranial nerve deficit.  "  Skin: Skin is warm and dry. No rash noted.   Psychiatric: She has a normal mood and affect. Her behavior is normal.   Vitals reviewed.      Assessment/Plan   Chris was seen today for hyperlipidemia and vitamin d deficiency.    Diagnoses and all orders for this visit:    Pure hypercholesterolemia  -     CBC & Differential; Future  -     Comprehensive Metabolic Panel; Future  -     Lipid Panel; Future  -     TSH; Future    Vitamin D deficiency  -     Vitamin D 25 Hydroxy; Future    Osteopenia of multiple sites  -     alendronate (FOSAMAX) 70 MG tablet; Take 1 tablet by mouth Every 7 (Seven) Days. Take on empty stomach with full glass of water. For the next 30 min  Do not bend over, eat or lay down.        Hyperlipidemia - well controlled with statin. Normal liver function tests. LDL target is below < 100 mg/dl (CHD or \"CHD risk equivalent\" is present). Patient's 109. Continue same treatment. Regular exercise recommended.  Vitamin D deficiency - well compensated with over the counter Vit D3. Continue same dose. This supplement is fat-soluble and has to be taken with meals.  Worsening of osteopenia of the spine and hip, with high next 10 years femoral fracture risk at 6% - we will discontinue Raloxifen and start weekly Alendronate. Take first thing in the morning, with full glass of water, and for the next 30 min do not eat, lay down or bend over.Weight beating exercise discussed and recommended.       "

## 2020-03-10 NOTE — PATIENT INSTRUCTIONS
"Hyperlipidemia - well controlled with statin. Normal liver function tests. LDL target is below < 100 mg/dl (CHD or \"CHD risk equivalent\" is present). Patient's 109. Continue same treatment. Regular exercise recommended.  Vitamin D deficiency - well compensated with over the counter Vit D3. Continue same dose. This supplement is fat-soluble and has to be taken with meals.  Worsening of osteopenia of the spine and hip, with high next 10 years femoral fracture risk at 6% - we will discontinue Raloxifen and start weekly Alendronate. Take first thing in the morning, with full glass of water, and for the next 30 min do not eat, lay down or bend over. Weight beating exercise discussed and recommended.    Return in about 6 months (around 8/27/2020) for Medicare Wellness after 08/27/2020, cholest, .  "

## 2020-05-12 DIAGNOSIS — E78.00 PURE HYPERCHOLESTEROLEMIA: ICD-10-CM

## 2020-05-12 RX ORDER — EZETIMIBE 10 MG/1
TABLET ORAL
Qty: 90 TABLET | Refills: 2 | Status: SHIPPED | OUTPATIENT
Start: 2020-05-12 | End: 2021-02-18 | Stop reason: SDUPTHER

## 2020-06-25 ENCOUNTER — OFFICE VISIT (OUTPATIENT)
Dept: OBSTETRICS AND GYNECOLOGY | Facility: CLINIC | Age: 65
End: 2020-06-25

## 2020-06-25 VITALS
BODY MASS INDEX: 26.29 KG/M2 | DIASTOLIC BLOOD PRESSURE: 84 MMHG | SYSTOLIC BLOOD PRESSURE: 122 MMHG | HEIGHT: 64 IN | WEIGHT: 154 LBS

## 2020-06-25 DIAGNOSIS — Z01.419 ENCOUNTER FOR GYNECOLOGICAL EXAMINATION WITHOUT ABNORMAL FINDING: Primary | ICD-10-CM

## 2020-06-25 DIAGNOSIS — M85.80 OSTEOPENIA, UNSPECIFIED LOCATION: ICD-10-CM

## 2020-06-25 DIAGNOSIS — Z85.3 PERSONAL HISTORY OF BREAST CANCER: ICD-10-CM

## 2020-06-25 LAB
DEVELOPER EXPIRATION DATE: NORMAL
DEVELOPER LOT NUMBER: NORMAL
EXPIRATION DATE: NORMAL
FECAL OCCULT BLOOD SCREEN, POC: NEGATIVE
Lab: NORMAL
NEGATIVE CONTROL: NEGATIVE
POSITIVE CONTROL: POSITIVE

## 2020-06-25 PROCEDURE — G0328 FECAL BLOOD SCRN IMMUNOASSAY: HCPCS | Performed by: OBSTETRICS & GYNECOLOGY

## 2020-06-25 PROCEDURE — G0101 CA SCREEN;PELVIC/BREAST EXAM: HCPCS | Performed by: OBSTETRICS & GYNECOLOGY

## 2020-06-25 RX ORDER — KETOCONAZOLE 20 MG/G
CREAM TOPICAL DAILY
COMMUNITY

## 2020-06-25 NOTE — PROGRESS NOTES
Subjective    Chief Complaint   Patient presents with   • Gynecologic Exam     AE      History of Present Illness    Chris Moreno is a 64 y.o. female who presents for annual exam.  Non-smoker.  DEXA scan February 2020 showed osteopenia and patient was switched from Evista to Fosamax.  Mammogram due in August.  Previous right mastectomy in 2006.  Colonoscopy not due for another 5 years.  No bleeding and no problems.    Obstetric History:  OB History    None        Menstrual History:     No LMP recorded.       Past Medical History:   Diagnosis Date   • Breast cancer (CMS/[facility])     03/2006 Ductal carcinoma in situ T1N0M0, multifocal, R breast, s/p total R mastectomy and axillary lymphatic mapping, .   • Dry eye syndrome    • Fracture, humerus     11/2008 R lateral humeral epicondilar Fx, s/p Sx    • HLD (hyperlipidemia)    • Hypercholesterolemia    • Hyperglycemia    • Melanoma in situ (CMS/[facility])     1996, L calf melanoma, s/p removal   • Nulliparity    • Osteopenia    • Subclinical hypothyroidism    • Vitamin D deficiency      Family History   Problem Relation Age of Onset   • Diabetes Mother    • Breast cancer Mother    • Melanoma Mother    • Lung cancer Father    • Hypertension Sister    • Breast cancer Sister    • Lung cancer Maternal Grandmother    • Diabetes Maternal Grandmother    • Glaucoma Paternal Grandmother    • Diabetes Paternal Grandmother    • Lung cancer Maternal Grandfather    • Breast cancer Sister      Social History     Tobacco Use   Smoking Status Never Smoker   Smokeless Tobacco Never Used         The following portions of the patient's history were reviewed and updated as appropriate: allergies, current medications, past family history, past medical history, past social history, past surgical history and problem list.    Review of Systems   Constitutional: Negative.  Negative for fever and unexpected weight change.   HENT: Negative.    Respiratory: Negative for shortness of breath  "and wheezing.    Cardiovascular: Negative for chest pain, palpitations and leg swelling.   Gastrointestinal: Negative for abdominal pain, anal bleeding and blood in stool.   Genitourinary: Negative for dysuria, pelvic pain, urgency, vaginal bleeding, vaginal discharge and vaginal pain.   Skin: Negative.    Neurological: Negative.    Hematological: Negative.  Negative for adenopathy.   Psychiatric/Behavioral: Negative.  Negative for dysphoric mood. The patient is not nervous/anxious.             Objective   Physical Exam   Constitutional: She is oriented to person, place, and time. Vital signs are normal. She appears well-developed and well-nourished.   HENT:   Head: Normocephalic.   Neck: Trachea normal. No tracheal deviation present. No thyromegaly present.   Cardiovascular: Normal rate, regular rhythm and normal heart sounds.   No murmur heard.  Pulmonary/Chest: Effort normal and breath sounds normal.   Breasts without masses, tenderness or nipple discharge   Abdominal: Soft. Normal appearance. She exhibits no mass. There is no hepatosplenomegaly. There is no tenderness. No hernia.   Genitourinary: Rectum normal, vagina normal and uterus normal. Rectal exam shows guaiac negative stool. Uterus is not enlarged and not tender. Cervix exhibits no motion tenderness. Right adnexum displays no mass and no tenderness. Left adnexum displays no mass and no tenderness. No vaginal discharge found.   Genitourinary Comments: External genitalia normal    Lymphadenopathy:     She has no cervical adenopathy.     She has no axillary adenopathy.   Neurological: She is alert and oriented to person, place, and time.   Skin: Skin is warm and dry. No rash noted.   Psychiatric: She has a normal mood and affect. Her behavior is normal. Cognition and memory are normal.       /84   Ht 161.9 cm (63.75\")   Wt 69.9 kg (154 lb)   BMI 26.64 kg/m²     Assessment/Plan   Chris was seen today for gynecologic exam.    Diagnoses and all orders " for this visit:    Encounter for gynecological examination without abnormal finding  -     IGP,rfx Aptima HPV All Pth  -     POC Occult Blood Stool    Personal history of breast cancer    Osteopenia, unspecified location        Mammogram.  Return to office 1 year.  Counseled about continuing calcium with vitamin D.

## 2020-06-26 DIAGNOSIS — Z12.31 VISIT FOR SCREENING MAMMOGRAM: Primary | ICD-10-CM

## 2020-06-29 LAB
CONV .: NORMAL
CYTOLOGIST CVX/VAG CYTO: NORMAL
CYTOLOGY CVX/VAG DOC CYTO: NORMAL
CYTOLOGY CVX/VAG DOC THIN PREP: NORMAL
DX ICD CODE: NORMAL
HIV 1 & 2 AB SER-IMP: NORMAL
OTHER STN SPEC: NORMAL
STAT OF ADQ CVX/VAG CYTO-IMP: NORMAL

## 2020-08-19 DIAGNOSIS — Z12.31 VISIT FOR SCREENING MAMMOGRAM: ICD-10-CM

## 2020-08-28 ENCOUNTER — OFFICE VISIT (OUTPATIENT)
Dept: INTERNAL MEDICINE | Facility: CLINIC | Age: 65
End: 2020-08-28

## 2020-08-28 VITALS
SYSTOLIC BLOOD PRESSURE: 120 MMHG | BODY MASS INDEX: 26.29 KG/M2 | HEART RATE: 76 BPM | OXYGEN SATURATION: 95 % | HEIGHT: 64 IN | DIASTOLIC BLOOD PRESSURE: 78 MMHG | TEMPERATURE: 97.9 F | WEIGHT: 154 LBS

## 2020-08-28 DIAGNOSIS — Z13.1 SCREENING FOR DIABETES MELLITUS: ICD-10-CM

## 2020-08-28 DIAGNOSIS — Z13.0 SCREENING FOR DEFICIENCY ANEMIA: ICD-10-CM

## 2020-08-28 DIAGNOSIS — Z00.00 MEDICARE ANNUAL WELLNESS VISIT, SUBSEQUENT: Primary | ICD-10-CM

## 2020-08-28 DIAGNOSIS — E55.9 VITAMIN D DEFICIENCY: ICD-10-CM

## 2020-08-28 DIAGNOSIS — E78.00 PURE HYPERCHOLESTEROLEMIA: ICD-10-CM

## 2020-08-28 LAB
25(OH)D3+25(OH)D2 SERPL-MCNC: 40.5 NG/ML (ref 30–100)
ALBUMIN SERPL-MCNC: 4.7 G/DL (ref 3.5–5.2)
ALBUMIN/GLOB SERPL: 2.2 G/DL
ALP SERPL-CCNC: 54 U/L (ref 39–117)
ALT SERPL-CCNC: 34 U/L (ref 1–33)
AST SERPL-CCNC: 26 U/L (ref 1–32)
BILIRUB SERPL-MCNC: 0.7 MG/DL (ref 0–1.2)
BUN SERPL-MCNC: 18 MG/DL (ref 8–23)
BUN/CREAT SERPL: 23.4 (ref 7–25)
CALCIUM SERPL-MCNC: 9.1 MG/DL (ref 8.6–10.5)
CHLORIDE SERPL-SCNC: 105 MMOL/L (ref 98–107)
CHOLEST SERPL-MCNC: 186 MG/DL (ref 0–200)
CO2 SERPL-SCNC: 24.2 MMOL/L (ref 22–29)
CREAT SERPL-MCNC: 0.77 MG/DL (ref 0.57–1)
ERYTHROCYTE [DISTWIDTH] IN BLOOD BY AUTOMATED COUNT: 12.8 % (ref 12.3–15.4)
GLOBULIN SER CALC-MCNC: 2.1 GM/DL
GLUCOSE SERPL-MCNC: 101 MG/DL (ref 65–99)
HCT VFR BLD AUTO: 40.5 % (ref 34–46.6)
HDLC SERPL-MCNC: 40 MG/DL (ref 40–60)
HGB BLD-MCNC: 13.3 G/DL (ref 12–15.9)
LDLC SERPL CALC-MCNC: 113 MG/DL (ref 0–100)
MCH RBC QN AUTO: 30.6 PG (ref 26.6–33)
MCHC RBC AUTO-ENTMCNC: 32.8 G/DL (ref 31.5–35.7)
MCV RBC AUTO: 93.1 FL (ref 79–97)
PLATELET # BLD AUTO: 225 10*3/MM3 (ref 140–450)
POTASSIUM SERPL-SCNC: 4.1 MMOL/L (ref 3.5–5.2)
PROT SERPL-MCNC: 6.8 G/DL (ref 6–8.5)
RBC # BLD AUTO: 4.35 10*6/MM3 (ref 3.77–5.28)
SODIUM SERPL-SCNC: 139 MMOL/L (ref 136–145)
TRIGL SERPL-MCNC: 166 MG/DL (ref 0–150)
VLDLC SERPL CALC-MCNC: 33.2 MG/DL
WBC # BLD AUTO: 5.03 10*3/MM3 (ref 3.4–10.8)

## 2020-08-28 PROCEDURE — G0439 PPPS, SUBSEQ VISIT: HCPCS | Performed by: FAMILY MEDICINE

## 2020-08-28 NOTE — PROGRESS NOTES
The ABCs of the Annual Wellness Visit  Subsequent Medicare Wellness Visit    Chief Complaint   Patient presents with   • Medicare Wellness-subsequent   • Hyperlipidemia       Subjective   History of Present Illness:  Chris Moreno is a 65 y.o. female who presents for a Subsequent Medicare Wellness Visit.    HEALTH RISK ASSESSMENT    Recent Hospitalizations:  No hospitalization(s) within the last year.    Current Medical Providers:  Patient Care Team:  Boyd Abarca MD as PCP - General (Family Medicine)  Belkis Rangel MD as PCP - Claims Attributed  Gilbert Perez MD as Consulting Physician (Hematology and Oncology)    Smoking Status:  Social History     Tobacco Use   Smoking Status Never Smoker   Smokeless Tobacco Never Used       Alcohol Consumption:  Social History     Substance and Sexual Activity   Alcohol Use Yes    Comment: social       Depression Screen:   PHQ-2/PHQ-9 Depression Screening 8/28/2020   Little interest or pleasure in doing things 0   Feeling down, depressed, or hopeless 0   Trouble falling or staying asleep, or sleeping too much 1   Feeling tired or having little energy 0   Poor appetite or overeating 0   Feeling bad about yourself - or that you are a failure or have let yourself or your family down 0   Trouble concentrating on things, such as reading the newspaper or watching television 0   Moving or speaking so slowly that other people could have noticed. Or the opposite - being so fidgety or restless that you have been moving around a lot more than usual 0   Thoughts that you would be better off dead, or of hurting yourself in some way 0   Total Score 1   If you checked off any problems, how difficult have these problems made it for you to do your work, take care of things at home, or get along with other people? Not difficult at all       Fall Risk Screen:  STEADI Fall Risk Assessment was completed, and patient is at HIGH risk for falls. Assessment completed on:8/28/2020  Patient had  a mechanical fall in her home which resulted in a foot fracture.    Health Habits and Functional and Cognitive Screening:  Functional & Cognitive Status 8/28/2020   Do you have difficulty preparing food and eating? No   Do you have difficulty bathing yourself, getting dressed or grooming yourself? No   Do you have difficulty using the toilet? No   Do you have difficulty moving around from place to place? No   Do you have trouble with steps or getting out of a bed or a chair? No   Current Diet Well Balanced Diet   Dental Exam Up to date   Eye Exam Up to date   Exercise (times per week) 3 times per week   Current Exercise Activities Include Walking   Do you need help using the phone?  No   Are you deaf or do you have serious difficulty hearing?  No   Do you need help with transportation? No   Do you need help shopping? No   Do you need help preparing meals?  No   Do you need help with housework?  No   Do you need help with laundry? No   Do you need help taking your medications? No   Do you need help managing money? No   Do you ever drive or ride in a car without wearing a seat belt? No   Have you felt unusual stress, anger or loneliness in the last month? No   Who do you live with? Spouse   If you need help, do you have trouble finding someone available to you? No   Have you been bothered in the last four weeks by sexual problems? No   Do you have difficulty concentrating, remembering or making decisions? No         Does the patient have evidence of cognitive impairment? No    Asprin use counseling:Does not need ASA (and currently is not on it)    Age-appropriate Screening Schedule:  Refer to the list below for future screening recommendations based on patient's age, sex and/or medical conditions. Orders for these recommended tests are listed in the plan section. The patient has been provided with a written plan.    Health Maintenance   Topic Date Due   • ZOSTER VACCINE (2 of 2) 10/23/2013   • TDAP/TD VACCINES (1 -  Tdap) 01/01/2021 (Originally 8/28/1966)   • LIPID PANEL  02/19/2021   • MAMMOGRAM  08/19/2021   • DXA SCAN  02/19/2022   • COLONOSCOPY  09/16/2026   • INFLUENZA VACCINE  Discontinued          The following portions of the patient's history were reviewed and updated as appropriate: allergies, current medications, past family history, past medical history, past social history, past surgical history and problem list.    Outpatient Medications Prior to Visit   Medication Sig Dispense Refill   • alendronate (FOSAMAX) 70 MG tablet Take 1 tablet by mouth Every 7 (Seven) Days. Take on empty stomach with full glass of water. For the next 30 min  Do not bend over, eat or lay down. 4 tablet 11   • Calcium Citrate (CITRACAL PO) Take  by mouth Daily.     • cetirizine (ZYRTEC ALLERGY) 10 MG tablet      • Cholecalciferol (VITAMIN D) 2000 UNITS capsule Take 2,000 Units by mouth daily.     • Coenzyme Q10 200 MG capsule      • ezetimibe (ZETIA) 10 MG tablet TAKE 1 TABLET BY MOUTH EVERY DAY 90 tablet 2   • fluticasone (FLONASE) 50 MCG/ACT nasal spray 2 sprays into the nostril(s) as directed by provider Daily. 16 g 11   • ketoconazole (NIZORAL) 2 % cream Apply  topically to the appropriate area as directed Daily.     • olopatadine (PATADAY) 0.2 % solution ophthalmic solution ADMINISTER 1 DROP TO BOTH EYES DAILY. 2.5 mL 11   • raloxifene (EVISTA) 60 MG tablet TAKE 1 TABLET BY MOUTH EVERY DAY 90 tablet 3   • rosuvastatin (CRESTOR) 20 MG tablet Take 1 tablet by mouth Daily. 90 tablet 3   • Unable to find 1 each Daily. Diferil, acne treatment       No facility-administered medications prior to visit.        Patient Active Problem List   Diagnosis   • Atopic rhinitis   • Pure hypercholesterolemia   • Keratoconjunctivitis sicca (CMS/HCC)   • Osteopenia   • Varicose veins   • Vitamin D deficiency   • Health care maintenance   • History of breast cancer   • History of melanoma   • Impaired fasting blood sugar     GYN: Dr. Rodriguez.  PAP completed  "this year,  Mammogram completed this year on the left breast.  Right breast missing after mastectomy and reconstruction.  Hx of stage 1 breast cancer.    Advanced Care Planning:  ACP discussion was held with the patient during this visit. Patient has an advance directive (not in EMR), copy requested.    Review of Systems    Compared to one year ago, the patient feels her physical health is the same.  Compared to one year ago, the patient feels her mental health is the same.    Reviewed chart for potential of high risk medication in the elderly: yes  Reviewed chart for potential of harmful drug interactions in the elderly:yes    Objective         Vitals:    08/28/20 0805   BP: 120/78   BP Location: Left arm   Patient Position: Sitting   Cuff Size: Adult   Pulse: 76   Temp: 97.9 °F (36.6 °C)   TempSrc: Oral   SpO2: 95%   Weight: 69.9 kg (154 lb)   Height: 161.9 cm (63.75\")   PainSc:   4   PainLoc: Ankle       Body mass index is 26.64 kg/m².  Discussed the patient's BMI with her. The BMI is in the acceptable range.    Physical Exam          Assessment/Plan   Medicare Risks and Personalized Health Plan  CMS Preventative Services Quick Reference  Advance Directive Discussion  Breast Cancer/Mammogram Screening  Cardiovascular risk  Depression/Dysphoria  Fall Risk  Polypharmacy    The above risks/problems have been discussed with the patient.  Pertinent information has been shared with the patient in the After Visit Summary.  Follow up plans and orders are seen below in the Assessment/Plan Section.    Diagnoses and all orders for this visit:    1. Medicare annual wellness visit, subsequent (Primary)  -     CBC (No Diff)  -     Comprehensive Metabolic Panel  -     Lipid Panel  -     Vitamin D 25 Hydroxy    2. Screening for deficiency anemia  -     CBC (No Diff)    3. Screening for diabetes mellitus  -     Comprehensive Metabolic Panel    4. Vitamin D deficiency  -     Vitamin D 25 Hydroxy    5. Pure hypercholesterolemia  -  "    Lipid Panel      After discussing the fall with the patient it seems like it was mechanical and it was just too much stuff in the floor.  She has since removed the clutter.  She is healing well.    Follow Up:  Return in about 6 months (around 3/1/2021) for Next scheduled follow up.     An After Visit Summary and PPPS were given to the patient.

## 2020-08-30 ENCOUNTER — PATIENT MESSAGE (OUTPATIENT)
Dept: INTERNAL MEDICINE | Facility: CLINIC | Age: 65
End: 2020-08-30

## 2020-08-30 DIAGNOSIS — E55.9 VITAMIN D DEFICIENCY: Primary | ICD-10-CM

## 2020-08-30 DIAGNOSIS — E78.00 PURE HYPERCHOLESTEROLEMIA: ICD-10-CM

## 2020-08-30 DIAGNOSIS — R73.01 IMPAIRED FASTING BLOOD SUGAR: ICD-10-CM

## 2020-08-31 DIAGNOSIS — E78.00 PURE HYPERCHOLESTEROLEMIA: ICD-10-CM

## 2020-08-31 RX ORDER — ROSUVASTATIN CALCIUM 20 MG/1
20 TABLET, COATED ORAL DAILY
Qty: 90 TABLET | Refills: 3 | Status: SHIPPED | OUTPATIENT
Start: 2020-08-31 | End: 2021-09-06

## 2020-08-31 NOTE — TELEPHONE ENCOUNTER
From: Chris Moreno  To: Boyd Abarca MD  Sent: 2020 11:37 AM EDT  Subject: Prescription Question    Hi,   My prescription for the drug Rouvastatin Calcium 20 MG, 90 day supply , # [id number], previously prescribed by Dr. Rangel, has . I have about 20 tablets left. Could you please issue a new prescription to [facility] on [facility]. Thanks! Chris Moreno

## 2020-11-09 ENCOUNTER — LAB (OUTPATIENT)
Dept: LAB | Facility: HOSPITAL | Age: 65
End: 2020-11-09

## 2020-11-09 ENCOUNTER — OFFICE VISIT (OUTPATIENT)
Dept: ONCOLOGY | Facility: CLINIC | Age: 65
End: 2020-11-09

## 2020-11-09 VITALS
WEIGHT: 151.6 LBS | RESPIRATION RATE: 16 BRPM | OXYGEN SATURATION: 98 % | SYSTOLIC BLOOD PRESSURE: 121 MMHG | HEART RATE: 76 BPM | TEMPERATURE: 97.2 F | DIASTOLIC BLOOD PRESSURE: 85 MMHG | HEIGHT: 64 IN | BODY MASS INDEX: 25.88 KG/M2

## 2020-11-09 DIAGNOSIS — Z85.3 HISTORY OF BREAST CANCER: Primary | ICD-10-CM

## 2020-11-09 DIAGNOSIS — M85.80 OSTEOPENIA, UNSPECIFIED LOCATION: ICD-10-CM

## 2020-11-09 DIAGNOSIS — Z85.820 HISTORY OF MELANOMA: ICD-10-CM

## 2020-11-09 LAB
ALBUMIN SERPL-MCNC: 4.5 G/DL (ref 3.5–5.2)
ALBUMIN/GLOB SERPL: 1.7 G/DL (ref 1.1–2.4)
ALP SERPL-CCNC: 57 U/L (ref 38–116)
ALT SERPL W P-5'-P-CCNC: 21 U/L (ref 0–33)
ANION GAP SERPL CALCULATED.3IONS-SCNC: 11.1 MMOL/L (ref 5–15)
AST SERPL-CCNC: 22 U/L (ref 0–32)
BASOPHILS # BLD AUTO: 0.05 10*3/MM3 (ref 0–0.2)
BASOPHILS NFR BLD AUTO: 0.9 % (ref 0–1.5)
BILIRUB SERPL-MCNC: 0.8 MG/DL (ref 0.2–1.2)
BUN SERPL-MCNC: 16 MG/DL (ref 6–20)
BUN/CREAT SERPL: 21.6 (ref 7.3–30)
CALCIUM SPEC-SCNC: 9.5 MG/DL (ref 8.5–10.2)
CHLORIDE SERPL-SCNC: 106 MMOL/L (ref 98–107)
CO2 SERPL-SCNC: 23.9 MMOL/L (ref 22–29)
CREAT SERPL-MCNC: 0.74 MG/DL (ref 0.6–1.1)
DEPRECATED RDW RBC AUTO: 42 FL (ref 37–54)
EOSINOPHIL # BLD AUTO: 0.14 10*3/MM3 (ref 0–0.4)
EOSINOPHIL NFR BLD AUTO: 2.6 % (ref 0.3–6.2)
ERYTHROCYTE [DISTWIDTH] IN BLOOD BY AUTOMATED COUNT: 12.5 % (ref 12.3–15.4)
GFR SERPL CREATININE-BSD FRML MDRD: 78 ML/MIN/1.73
GLOBULIN UR ELPH-MCNC: 2.6 GM/DL (ref 1.8–3.5)
GLUCOSE SERPL-MCNC: 112 MG/DL (ref 74–124)
HCT VFR BLD AUTO: 40.3 % (ref 34–46.6)
HGB BLD-MCNC: 13.8 G/DL (ref 12–15.9)
IMM GRANULOCYTES # BLD AUTO: 0.01 10*3/MM3 (ref 0–0.05)
IMM GRANULOCYTES NFR BLD AUTO: 0.2 % (ref 0–0.5)
LYMPHOCYTES # BLD AUTO: 2.36 10*3/MM3 (ref 0.7–3.1)
LYMPHOCYTES NFR BLD AUTO: 44 % (ref 19.6–45.3)
MCH RBC QN AUTO: 31.4 PG (ref 26.6–33)
MCHC RBC AUTO-ENTMCNC: 34.2 G/DL (ref 31.5–35.7)
MCV RBC AUTO: 91.6 FL (ref 79–97)
MONOCYTES # BLD AUTO: 0.35 10*3/MM3 (ref 0.1–0.9)
MONOCYTES NFR BLD AUTO: 6.5 % (ref 5–12)
NEUTROPHILS NFR BLD AUTO: 2.45 10*3/MM3 (ref 1.7–7)
NEUTROPHILS NFR BLD AUTO: 45.8 % (ref 42.7–76)
NRBC BLD AUTO-RTO: 0 /100 WBC (ref 0–0.2)
PLATELET # BLD AUTO: 249 10*3/MM3 (ref 140–450)
PMV BLD AUTO: 10.6 FL (ref 6–12)
POTASSIUM SERPL-SCNC: 4 MMOL/L (ref 3.5–4.7)
PROT SERPL-MCNC: 7.1 G/DL (ref 6.3–8)
RBC # BLD AUTO: 4.4 10*6/MM3 (ref 3.77–5.28)
SODIUM SERPL-SCNC: 141 MMOL/L (ref 134–145)
WBC # BLD AUTO: 5.36 10*3/MM3 (ref 3.4–10.8)

## 2020-11-09 PROCEDURE — 36415 COLL VENOUS BLD VENIPUNCTURE: CPT

## 2020-11-09 PROCEDURE — 85025 COMPLETE CBC W/AUTO DIFF WBC: CPT

## 2020-11-09 PROCEDURE — 99213 OFFICE O/P EST LOW 20 MIN: CPT | Performed by: INTERNAL MEDICINE

## 2020-11-09 PROCEDURE — 80053 COMPREHEN METABOLIC PANEL: CPT

## 2020-11-09 RX ORDER — TERBINAFINE HYDROCHLORIDE 250 MG/1
250 TABLET ORAL DAILY
COMMUNITY
Start: 2020-09-16 | End: 2021-03-01

## 2020-11-09 RX ORDER — INFLUENZA A VIRUS A/MICHIGAN/45/2015 X-275 (H1N1) ANTIGEN (FORMALDEHYDE INACTIVATED), INFLUENZA A VIRUS A/SINGAPORE/INFIMH-16-0019/2016 IVR-186 (H3N2) ANTIGEN (FORMALDEHYDE INACTIVATED), INFLUENZA B VIRUS B/PHUKET/3073/2013 ANTIGEN (FORMALDEHYDE INACTIVATED), AND INFLUENZA B VIRUS B/MARYLAND/15/2016 BX-69A ANTIGEN (FORMALDEHYDE INACTIVATED) 60; 60; 60; 60 UG/.7ML; UG/.7ML; UG/.7ML; UG/.7ML
INJECTION, SUSPENSION INTRAMUSCULAR
COMMUNITY
Start: 2020-11-03 | End: 2021-03-01

## 2020-11-09 NOTE — PROGRESS NOTES
Subjective   REASONS FOR FOLLOWUP:   1. 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   2. Negative genetic testing for BRCA1 and BRCA2 in July 2006.   3. Osteopenia  HISTORY OF PRESENT ILLNESS:   The patient is a 65 y.o. female with the above-noted history of DCIS and microinvasion in the right breast in 2006. She underwent mastectomy and TRAM flap reconstruction. She was started on Evista as a breast cancer prevention and has elected to remain on Evista after completing 5 years of therapy due to the fact that she also has significant osteopenia.       She is up-to-date on her mammograms with her most recent screening mammogram of the remaining left breast having been performed 08-.  She has not had any other major health issues this year and looks great in the office today.  She also continues to follow-up with her dermatologist annually for a skin exam due to her past history of melanoma excised from the left lower leg.    DEXA scan from 02- continue to show osteopenia with a slight decrease in bone density compared to the prior study.  At that time her primary care physician Dr. Rangel elected to discontinue the Evista and started her on weekly Fosamax.  She seems to be tolerating this well.  History of Present Illness      Past Medical History, Past Surgical History, Social History, Family History have been reviewed and are without significant changes except as mentioned.    Review of Systems   Constitutional: Negative for activity change, appetite change, fatigue, fever and unexpected weight change.   HENT: Negative for hearing loss, nosebleeds, trouble swallowing and voice change.    Eyes: Negative for visual disturbance.   Respiratory: Negative for cough, shortness of breath and wheezing.    Cardiovascular: Negative for chest pain and  "palpitations.   Gastrointestinal: Negative for abdominal pain, diarrhea, nausea and vomiting.   Genitourinary: Negative for difficulty urinating, frequency, hematuria and urgency.   Musculoskeletal: Negative for back pain and neck pain.   Skin: Negative for rash.   Neurological: Negative for dizziness, seizures, syncope and headaches.   Hematological: Negative for adenopathy. Does not bruise/bleed easily.   Psychiatric/Behavioral: Negative for behavioral problems. The patient is not nervous/anxious.    Unchanged on 11-  A comprehensive 14 point review of systems was performed and was negative except as mentioned.    Medications:  The current medication list was reviewed in the EMR    ALLERGIES:  No Known Allergies    Objective      Vitals:    11/09/20 1058   BP: 121/85   Pulse: 76   Resp: 16   Temp: 97.2 °F (36.2 °C)   TempSrc: Skin   SpO2: 98%   Weight: 68.8 kg (151 lb 9.6 oz)   Height: 161.9 cm (63.74\")  Comment: new height   PainSc: 0-No pain     Current Status 11/7/2019   ECOG score 0       Physical Exam   Constitutional: She is oriented to person, place, and time. She appears well-developed. No distress.   HENT:   Head: Normocephalic.   Eyes: Pupils are equal, round, and reactive to light.   Neck: Neck supple. No JVD present. No thyromegaly present.   Cardiovascular: Normal rate and regular rhythm. Exam reveals no gallop and no friction rub.   No murmur heard.  Pulmonary/Chest: Effort normal and breath sounds normal. She has no wheezes. She has no rales. Left breast exhibits no mass, no nipple discharge and no skin change.   Right TRAM reconstruction.   Abdominal: Soft. Bowel sounds are normal. She exhibits no distension and no mass. There is no abdominal tenderness.   Musculoskeletal: No deformity.   Neurological: She is alert and oriented to person, place, and time. She has normal reflexes. No cranial nerve deficit.   Skin: Skin is dry. No rash noted.   Psychiatric: Judgment normal.   Repeated and " unchanged on 11-    RECENT LABS:  Hematology WBC   Date Value Ref Range Status   11/09/2020 5.36 3.40 - 10.80 10*3/mm3 Final   08/28/2020 5.03 3.40 - 10.80 10*3/mm3 Final     RBC   Date Value Ref Range Status   11/09/2020 4.40 3.77 - 5.28 10*6/mm3 Final   08/28/2020 4.35 3.77 - 5.28 10*6/mm3 Final     Hemoglobin   Date Value Ref Range Status   11/09/2020 13.8 12.0 - 15.9 g/dL Final     Hematocrit   Date Value Ref Range Status   11/09/2020 40.3 34.0 - 46.6 % Final     Platelets   Date Value Ref Range Status   11/09/2020 249 140 - 450 10*3/mm3 Final       Mammogram 8/20/2020   OK           DEXA 2/19/2020     Osteopenia    Assessment/Plan   1.  DCIS with microinvasion of the right breast status post mastectomy and TRAM flap reconstruction.  She also received therapy with Evista as breast cancer prevention strategy.  After 5 years of Evista she elected to remain on the medication due to her osteopenia.  As noted above, this has since been discontinued and she is now on weekly Fosamax prescribed by her primary care physician.  2.  Melanoma excised from the leg.  She continues regular follow-up with her dermatologist.    Plan  1.  Return in 12 months for routine follow-up.  2.  Her next mammogram  will be due in August 2021.  3.  She will continue weekly Fosamax and will have her bone density scans scheduled through her primary care office.                    11/9/2020      CC:

## 2020-12-08 ENCOUNTER — TELEPHONE (OUTPATIENT)
Dept: INTERNAL MEDICINE | Facility: CLINIC | Age: 65
End: 2020-12-08

## 2020-12-08 NOTE — TELEPHONE ENCOUNTER
Patient called and has sinus drainage and headache for about 2 weeks now. She gets around this time every year. Would like script called in if possible. Best call back [phone].    [id number] - [place], [place] - [address] AT [address]AR [address] - [phone]  - [phone]   [phone]

## 2020-12-10 ENCOUNTER — OFFICE VISIT (OUTPATIENT)
Dept: INTERNAL MEDICINE | Facility: CLINIC | Age: 65
End: 2020-12-10

## 2020-12-10 DIAGNOSIS — J01.10 ACUTE NON-RECURRENT FRONTAL SINUSITIS: Primary | ICD-10-CM

## 2020-12-10 PROCEDURE — 99442 PR PHYS/QHP TELEPHONE EVALUATION 11-20 MIN: CPT | Performed by: FAMILY MEDICINE

## 2020-12-10 RX ORDER — AMOXICILLIN 500 MG/1
1000 CAPSULE ORAL 3 TIMES DAILY
Qty: 60 CAPSULE | Refills: 0 | Status: SHIPPED | OUTPATIENT
Start: 2020-12-10 | End: 2020-12-20

## 2020-12-10 NOTE — PROGRESS NOTES
"Subjective   Chris Moreno is a 65 y.o. female.  Sinus pressure and congestion    You have chosen to receive care through a telephone visit. Do you consent to use a telephone visit for your medical care today? Yes      History of Present Illness     Patient checking in because she had been dealing with sinus pressure and headache with congestion.  Having the symptoms since around Thanksgiving Day and was low key at home with .  She had a \"head cold\" with congestion and postnasal drip. Endorses a headache which has been nagging over the eye.  Tylenol has been helping some.  She has been home with  and no exposure to Covid-19.  Denies fevers/chills, cough, shortness of breath, any cough or shortness of breath,  loss of your taste or smell, no exposures to Covid 19.  Not tried mucinex.    The following portions of the patient's history were reviewed and updated as appropriate: allergies, current medications, past family history, past medical history, past social history, past surgical history and problem list.    Review of Systems   Constitutional: Positive for fatigue. Negative for fever.   HENT: Positive for sinus pressure.    Respiratory: Negative for shortness of breath and wheezing.    Cardiovascular: Negative for chest pain and palpitations.   Gastrointestinal: Negative for constipation and nausea.   Neurological: Positive for headaches.       Objective   Physical Exam N/A    Assessment/Plan   Diagnoses and all orders for this visit:    1. Acute non-recurrent frontal sinusitis (Primary)  -     amoxicillin (AMOXIL) 500 MG capsule; Take 2 capsules by mouth 3 (Three) Times a Day for 10 days.  Dispense: 60 capsule; Refill: 0      Covid-19 negative by screen.  Recommend treating for bacterial sinus infection with amoxil.  She will  some Mucinex.   Recommend plenty of fluids and rest.  I do not see any need for her to quarantine beyond typical precautions for COVID-19.    I spent 10 minutes on the " call and another 7 minutes completing the encounter along with research.

## 2021-01-26 ENCOUNTER — TELEPHONE (OUTPATIENT)
Dept: INTERNAL MEDICINE | Facility: CLINIC | Age: 66
End: 2021-01-26

## 2021-01-26 NOTE — TELEPHONE ENCOUNTER
Pt is asking if she needs labs done prior to her next visit? I explained we weren't doing labs prior to visit because we are trying to cut down on the amount of people waiting in the waiting room.  She then asked what would be the point of the visit if you didn't have her cholesterol numbers?  I advised her I would check with you and if you'd like labs done prior to her visit she can get them done at [facility] or an out patient labs  Please advise if you would like labs ordered and I will put in the orders and get them faxed

## 2021-02-18 DIAGNOSIS — J30.9 ALLERGIC RHINITIS, UNSPECIFIED SEASONALITY, UNSPECIFIED TRIGGER: ICD-10-CM

## 2021-02-18 DIAGNOSIS — E78.00 PURE HYPERCHOLESTEROLEMIA: ICD-10-CM

## 2021-02-18 RX ORDER — FLUTICASONE PROPIONATE 50 MCG
2 SPRAY, SUSPENSION (ML) NASAL DAILY
Qty: 16 G | Refills: 11 | Status: SHIPPED | OUTPATIENT
Start: 2021-02-18 | End: 2022-04-06

## 2021-02-18 RX ORDER — EZETIMIBE 10 MG/1
10 TABLET ORAL DAILY
Qty: 90 TABLET | Refills: 2 | Status: SHIPPED | OUTPATIENT
Start: 2021-02-18 | End: 2021-11-02

## 2021-02-18 NOTE — TELEPHONE ENCOUNTER
Caller: Chris Moreno    Relationship: Self    Best call back number: [phone] (H)    Medication needed:   ezetimibe (ZETIA) 10 MG tablet   2 ordered         Summary: TAKE 1 TABLET BY MOUTH EVERY DAY, Normal   Start: 5/12/2020  Ord/Sold: 5/12/2020         fluticasone (FLONASE) 50 MCG/ACT nasal spray  2 spray, Daily 11 ordered         Summary: 2 sprays into the nostril(s) as directed by provider Daily., Starting Fri 11/2/2018, Normal   Dose, Route, Frequency: 2 spray, Nasal, Daily              When do you need the refill by: ASAP    What details did the patient provide when requesting the medication: PATIENT CALLED TO REQUEST A MEDICATION REFILL. PATIENT STATES THAT SHE HAS AN APPOINTMENT WITH DR. VITA AYALA ON 03/01/21. PATIENT STATES THAT SHE HAS 4 PILLS LEFT.     Does the patient have less than a 3 day supply:  [] Yes  [] No    What is the patient's preferred pharmacy:    [id number] - [place], [place] - [address] AT [address]AR [facility] [address] [phone] [facility] [phone] FX      THANKS

## 2021-02-22 ENCOUNTER — LAB (OUTPATIENT)
Dept: LAB | Facility: HOSPITAL | Age: 66
End: 2021-02-22

## 2021-02-22 PROCEDURE — 80053 COMPREHEN METABOLIC PANEL: CPT | Performed by: FAMILY MEDICINE

## 2021-02-22 PROCEDURE — 82306 VITAMIN D 25 HYDROXY: CPT | Performed by: FAMILY MEDICINE

## 2021-02-22 PROCEDURE — 85027 COMPLETE CBC AUTOMATED: CPT | Performed by: FAMILY MEDICINE

## 2021-02-22 PROCEDURE — 80061 LIPID PANEL: CPT | Performed by: FAMILY MEDICINE

## 2021-03-01 ENCOUNTER — OFFICE VISIT (OUTPATIENT)
Dept: INTERNAL MEDICINE | Facility: CLINIC | Age: 66
End: 2021-03-01

## 2021-03-01 VITALS
BODY MASS INDEX: 25.44 KG/M2 | HEIGHT: 64 IN | SYSTOLIC BLOOD PRESSURE: 118 MMHG | OXYGEN SATURATION: 98 % | HEART RATE: 84 BPM | WEIGHT: 149 LBS | DIASTOLIC BLOOD PRESSURE: 76 MMHG | TEMPERATURE: 98.2 F

## 2021-03-01 DIAGNOSIS — E78.00 PURE HYPERCHOLESTEROLEMIA: ICD-10-CM

## 2021-03-01 DIAGNOSIS — E55.9 VITAMIN D DEFICIENCY: ICD-10-CM

## 2021-03-01 DIAGNOSIS — M85.89 OSTEOPENIA OF MULTIPLE SITES: Primary | ICD-10-CM

## 2021-03-01 PROCEDURE — 99214 OFFICE O/P EST MOD 30 MIN: CPT | Performed by: FAMILY MEDICINE

## 2021-03-01 RX ORDER — ALENDRONATE SODIUM 70 MG/1
70 TABLET ORAL
Qty: 12 TABLET | Refills: 3 | Status: SHIPPED | OUTPATIENT
Start: 2021-03-01 | End: 2022-01-05

## 2021-03-01 NOTE — PROGRESS NOTES
"Chief Complaint  Hyperlipidemia and Med Refill    Subjective          Chris Moreno presents to [facility]  History of Present Illness     Patient taking alendronate for osteopenia for spine and hips.  Due for next BMD 2/2022.  She was previously on Evista.      HLD-stable.  Patient taking rosuvastatin as prescribed.  Trying to adhere to a low-fat diet.    Will need vitamin D checked in the future given that she has the osteopenia of her spine and hips and takes the calcium with vitamin D.  Also takes 2000 units of vitamin D per day for deficiency.        Review of Systems   Constitutional: Negative for activity change, fatigue and fever.   Respiratory: Negative for cough and shortness of breath.    Cardiovascular: Negative for chest pain and palpitations.   Gastrointestinal: Negative for abdominal pain.     Objective   Vital Signs:   /76 (BP Location: Left arm, Patient Position: Sitting, Cuff Size: Adult)   Pulse 84   Temp 98.2 °F (36.8 °C) (Tympanic)   Ht 161.9 cm (63.75\")   Wt 67.6 kg (149 lb)   SpO2 98%   BMI 25.78 kg/m²     Physical Exam  Vitals signs and nursing note reviewed.   Constitutional:       General: She is not in acute distress.     Appearance: Normal appearance.   Cardiovascular:      Rate and Rhythm: Normal rate and regular rhythm.      Heart sounds: Normal heart sounds.   Pulmonary:      Effort: Pulmonary effort is normal.      Breath sounds: Normal breath sounds.   Neurological:      Mental Status: She is alert.        Result Review :   The following data was reviewed by: Boyd Abarca MD on 03/01/2021:  Common labs    Common Labsle 8/28/20 8/28/20 8/28/20 11/9/20 11/9/20 2/22/21 2/22/21 2/22/21    0917 0917 0917 1051 1051 0921 0921 0921   Glucose     112  92    Glucose  101 (A)         BUN  18   16  15    Creatinine  0.77   0.74  0.76    eGFR Non African Am  75   78  76    eGFR African Am  91         Sodium  139   141  140    Potassium  4.1   4.0  4.1 "    Chloride  105   106  106    Calcium  9.1   9.5  9.5    Total Protein  6.8         Albumin  4.70   4.50  4.50    Total Bilirubin  0.7   0.8  0.7    Alkaline Phosphatase  54   57  57    AST (SGOT)  26   22  21    ALT (SGPT)  34 (A)   21  18    WBC 5.03   5.36  5.16     Hemoglobin 13.3   13.8  14.3     Hematocrit 40.5   40.3  41.9     Platelets 225   249  225     Total Cholesterol        174   Total Cholesterol   186        Triglycerides   166 (A)     140   HDL Cholesterol   40     45   LDL Cholesterol    113 (A)     104 (A)   (A) Abnormal value                      Assessment and Plan {CC Problem List  Visit Diagnosis  ROS  Review (Popup)  Health Maintenance  Quality  BestPractice  Medications  SmartSets  SnapShot Encounters  Media :23}   Diagnoses and all orders for this visit:    1. Osteopenia of multiple sites (Primary)  -     alendronate (Fosamax) 70 MG tablet; Take 1 tablet by mouth Every 7 (Seven) Days. Take on empty stomach with full glass of water. For the next 30 min  Do not bend over, eat or lay down.  Dispense: 12 tablet; Refill: 3  -     CBC (No Diff); Future  -     Comprehensive Metabolic Panel; Future    2. Pure hypercholesterolemia  -     CBC (No Diff); Future  -     Comprehensive Metabolic Panel; Future  -     Lipid Panel; Future    3. Vitamin D deficiency  -     Vitamin D 25 Hydroxy; Future      Ordered labs for the future.  Reordered alendronate.  Continue current medications as prescribed.        Follow Up   Return in about 6 months (around 9/1/2021) for Medicare Wellness.  Patient was given instructions and counseling regarding her condition or for health maintenance advice. Please see specific information pulled into the AVS if appropriate.       Answers for HPI/ROS submitted by the patient on 2/27/2021   What is the primary reason for your visit?: Other  Please describe your symptoms.: 6 month follow up on cholesterol screening., Inquire as to a few side effects I am experiencing  might be a result of newer medication for prevention of osteoporosis:  possible lactose intolerance (churning in abdomen)and achy spot on the outside  of my left foot when I walk and tenderness on the top side of my right rib cage.  Have you had these symptoms before?: No  How long have you been having these symptoms?: Greater than 2 weeks  Please list any medications you are currently taking for this condition.: The drug my symptoms refer to is the tablet I take once a week, Adendronate Sodium Tablets, USP.  70 mg.  Please describe any probable cause for these symptoms. : The foot symptom could be an after effect from when I broke 2 bones ( below the 2nd and 3rd metatarsal s in my foot this past July., When I avoid milk or ice cream, most abdominal symptoms seem to diminish.

## 2021-03-01 NOTE — PATIENT INSTRUCTIONS
Osteopenia    Osteopenia is a loss of thickness (density) inside of the bones. Another name for osteopenia is low bone mass.  Mild osteopenia is a normal part of aging. It is not a disease, and it does not cause symptoms. However, if you have osteopenia and continue to lose bone mass, you could develop a condition that causes the bones to become thin and break more easily (osteoporosis). You may also lose some height, have back pain, and have a stooped posture. Although osteopenia is not a disease, making changes to your lifestyle and diet can help to prevent osteopenia from developing into osteoporosis.  What are the causes?  Osteopenia is caused by loss of calcium in the bones.   Bones are constantly changing. Old bone cells are continually being replaced with new bone cells. This process builds new bone. The mineral calcium is needed to build new bone and maintain bone density. Bone density is usually highest around age 35. After that, most people's bodies cannot replace all the bone they have lost with new bone.  What increases the risk?  You are more likely to develop this condition if:  · You are older than age 50.  · You are a woman who went through menopause early.  · You have a long illness that keeps you in bed.  · You do not get enough exercise.  · You lack certain nutrients (malnutrition).  · You have an overactive thyroid gland (hyperthyroidism).  · You smoke.  · You drink a lot of alcohol.  · You are taking medicines that weaken the bones, such as steroids.  What are the signs or symptoms?  This condition does not cause any symptoms. You may have a slightly higher risk for bone breaks (fractures), so getting fractures more easily than normal may be an indication of osteopenia.  How is this diagnosed?  Your health care provider can diagnose this condition with a special type of X-ray exam that measures bone density (dual-energy X-ray absorptiometry, DEXA). This test can measure bone density in your  hips, spine, and wrists.  Osteopenia has no symptoms, so this condition is usually diagnosed after a routine bone density screening test is done for osteoporosis. This routine screening is usually done for:  · Women who are age 65 or older.  · Men who are age 70 or older.  If you have risk factors for osteopenia, you may have the screening test at an earlier age.  How is this treated?  Making dietary and lifestyle changes can lower your risk for osteoporosis. If you have severe osteopenia that is close to becoming osteoporosis, your health care provider may prescribe medicines and dietary supplements such as calcium and vitamin D. These supplements help to rebuild bone density.  Follow these instructions at home:    · Take over-the-counter and prescription medicines only as told by your health care provider. These include vitamins and supplements.  · Eat a diet that is high in calcium and vitamin D.  ? Calcium is found in dairy products, beans, salmon, and leafy green vegetables like spinach and broccoli.  ? Look for foods that have vitamin D and calcium added to them (fortified foods), such as orange juice, cereal, and bread.  · Do 30 or more minutes of a weight-bearing exercise every day, such as walking, jogging, or playing a sport. These types of exercises strengthen the bones.  · Take precautions at home to lower your risk of falling, such as:  ? Keeping rooms well-lit and free of clutter, such as cords.  ? Installing safety rails on stairs.  ? Using rubber mats in the bathroom or other areas that are often wet or slippery.  · Do not use any products that contain nicotine or tobacco, such as cigarettes and e-cigarettes. If you need help quitting, ask your health care provider.  · Avoid alcohol or limit alcohol intake to no more than 1 drink a day for nonpregnant women and 2 drinks a day for men. One drink equals 12 oz of beer, 5 oz of wine, or 1½ oz of hard liquor.  · Keep all follow-up visits as told by your  health care provider. This is important.  Contact a health care provider if:  · You have not had a bone density screening for osteoporosis and you are:  ? A woman, age 65 or older.  ? A man, age 70 or older.  · You are a postmenopausal woman who has not had a bone density screening for osteoporosis.  · You are older than age 50 and you want to know if you should have bone density screening for osteoporosis.  Summary  · Osteopenia is a loss of thickness (density) inside of the bones. Another name for osteopenia is low bone mass.  · Osteopenia is not a disease, but it may increase your risk for a condition that causes the bones to become thin and break more easily (osteoporosis).  · You may be at risk for osteopenia if you are older than age 50 or if you are a woman who went through early menopause.  · Osteopenia does not cause any symptoms, but it can be diagnosed with a bone density screening test.  · Dietary and lifestyle changes are the first treatment for osteopenia. These may lower your risk for osteoporosis.  This information is not intended to replace advice given to you by your health care provider. Make sure you discuss any questions you have with your health care provider.  Document Revised: 11/30/2018 Document Reviewed: 09/26/2018  [employer] Patient Education © 2020 [employer]

## 2021-03-19 ENCOUNTER — BULK ORDERING (OUTPATIENT)
Dept: CASE MANAGEMENT | Facility: OTHER | Age: 66
End: 2021-03-19

## 2021-03-19 DIAGNOSIS — Z23 IMMUNIZATION DUE: ICD-10-CM

## 2021-08-10 DIAGNOSIS — Z12.31 ENCOUNTER FOR SCREENING MAMMOGRAM FOR MALIGNANT NEOPLASM OF BREAST: Primary | ICD-10-CM

## 2021-09-01 DIAGNOSIS — Z12.31 ENCOUNTER FOR SCREENING MAMMOGRAM FOR MALIGNANT NEOPLASM OF BREAST: ICD-10-CM

## 2021-09-05 DIAGNOSIS — E78.00 PURE HYPERCHOLESTEROLEMIA: ICD-10-CM

## 2021-09-06 RX ORDER — ROSUVASTATIN CALCIUM 20 MG/1
TABLET, COATED ORAL
Qty: 90 TABLET | Refills: 3 | Status: SHIPPED | OUTPATIENT
Start: 2021-09-06 | End: 2022-09-28

## 2021-09-24 ENCOUNTER — OFFICE VISIT (OUTPATIENT)
Dept: INTERNAL MEDICINE | Facility: CLINIC | Age: 66
End: 2021-09-24

## 2021-09-24 VITALS
WEIGHT: 151 LBS | OXYGEN SATURATION: 98 % | BODY MASS INDEX: 25.78 KG/M2 | DIASTOLIC BLOOD PRESSURE: 70 MMHG | SYSTOLIC BLOOD PRESSURE: 112 MMHG | HEART RATE: 80 BPM | TEMPERATURE: 98.2 F | HEIGHT: 64 IN

## 2021-09-24 DIAGNOSIS — E55.9 VITAMIN D DEFICIENCY: Chronic | ICD-10-CM

## 2021-09-24 DIAGNOSIS — Z00.00 MEDICARE ANNUAL WELLNESS VISIT, SUBSEQUENT: Primary | ICD-10-CM

## 2021-09-24 DIAGNOSIS — E78.00 PURE HYPERCHOLESTEROLEMIA: Chronic | ICD-10-CM

## 2021-09-24 DIAGNOSIS — M85.80 OSTEOPENIA, UNSPECIFIED LOCATION: Chronic | ICD-10-CM

## 2021-09-24 DIAGNOSIS — Z85.3 HISTORY OF BREAST CANCER: ICD-10-CM

## 2021-09-24 DIAGNOSIS — J30.2 SEASONAL ALLERGIES: ICD-10-CM

## 2021-09-24 DIAGNOSIS — Z85.820 HISTORY OF MELANOMA: ICD-10-CM

## 2021-09-24 PROCEDURE — G0439 PPPS, SUBSEQ VISIT: HCPCS | Performed by: NURSE PRACTITIONER

## 2021-09-24 NOTE — PATIENT INSTRUCTIONS
Allergic Rhinitis, Adult  Allergic rhinitis is a reaction to allergens. Allergens are things that can cause an allergic reaction. This condition affects the lining inside the nose (mucous membrane).  There are two types of allergic rhinitis:  · Seasonal. This type is also called hay fever. It happens only during some times of the year.  · Perennial. This type can happen at any time of the year.  This condition cannot be spread from person to person (is not contagious). It can be mild, worse, or very bad. It can develop at any age and may be outgrown.  What are the causes?  This condition may be caused by:  · Pollen from grasses, trees, and weeds.  · Dust mites.  · Smoke.  · Mold.  · Car fumes.  · The pee (urine), spit, or dander of pets. Dander is dead skin cells from a pet.  What increases the risk?  You are more likely to develop this condition if:  · You have allergies in your family.  · You have problems like allergies in your family. You may have:  ? Swelling of parts of your eyes and eyelids.  ? Asthma. This affects how you breathe.  ? Long-term redness and swelling on your skin.  ? Food allergies.  What are the signs or symptoms?  The main symptom of this condition is a runny or stuffy nose (nasal congestion). Other symptoms may include:  · Sneezing or coughing.  · Itching and tearing of your eyes.  · Mucus that drips down the back of your throat (postnasal drip).  · Trouble sleeping.  · Feeling tired.  · Headache.  · Sore throat.  How is this treated?  There is no cure for this condition. You should avoid things that you are allergic to. Treatment can help to relieve symptoms. This may include:  · Medicines that block allergy symptoms, such as corticosteroids or antihistamines. These may be given as a shot, nasal spray, or pill.  · Avoiding things you are allergic to.  · Medicines that give you bits of what you are allergic to over time. This is called immunotherapy. It is done if other treatments do not  help. You may get:  ? Shots.  ? Medicine under your tongue.  · Stronger medicines, if other treatments do not help.  Follow these instructions at home:  Avoiding allergens  Find out what things you are allergic to and avoid them. To do this, try these things:  · If you get allergies any time of year:  ? Replace carpet with wood, tile, or vinyl julian. Carpet can trap pet dander and dust.  ? Do not smoke. Do not allow smoking in your home.  ? Change your heating and air conditioning filters at least once a month.  · If you get allergies only some times of the year:  ? Keep windows closed when you can.  ? Plan things to do outside when pollen counts are lowest. Check pollen counts before you plan things to do outside.  ? When you come indoors, change your clothes and shower before you sit on furniture or bedding.  · If you are allergic to a pet:  ? Keep the pet out of your bedroom.  ? Vacuum, sweep, and dust often.    General instructions  · Take over-the-counter and prescription medicines only as told by your doctor.  · Drink enough fluid to keep your pee (urine) pale yellow.  · Keep all follow-up visits as told by your doctor. This is important.  Where to find more information  · American Academy of Allergy, Asthma & Immunology: www.aaaai.org  Contact a doctor if:  · You have a fever.  · You get a cough that does not go away.  · You make whistling sounds when you breathe (wheeze).  · Your symptoms slow you down.  · Your symptoms stop you from doing your normal things each day.  Get help right away if:  · You are short of breath.  This symptom may be an emergency. Do not wait to see if the symptom will go away. Get medical help right away. Call your local emergency services (911 in the [place] Do not drive yourself to the hospital.  Summary  · Allergic rhinitis may be treated by taking medicines and avoiding things you are allergic to.  · If you have allergies only some of the year, keep windows closed when you can  at those times.  · Contact your doctor if you get a fever or a cough that does not go away.  This information is not intended to replace advice given to you by your health care provider. Make sure you discuss any questions you have with your health care provider.  Document Revised: 02/08/2021 Document Reviewed: 12/15/2020  [employer] Patient Education © 2021 [employer]    Dyslipidemia  Dyslipidemia is an imbalance of waxy, fat-like substances (lipids) in the blood. The body needs lipids in small amounts. Dyslipidemia often involves a high level of cholesterol or triglycerides, which are types of lipids.  Common forms of dyslipidemia include:  · High levels of LDL cholesterol. LDL is the type of cholesterol that causes fatty deposits (plaques) to build up in the blood vessels that carry blood away from your heart (arteries).  · Low levels of HDL cholesterol. HDL cholesterol is the type of cholesterol that protects against heart disease. High levels of HDL remove the LDL buildup from arteries.  · High levels of triglycerides. Triglycerides are a fatty substance in the blood that is linked to a buildup of plaques in the arteries.  What are the causes?  Primary dyslipidemia is caused by changes (mutations) in genes that are passed down through families (inherited). These mutations cause several types of dyslipidemia.  Secondary dyslipidemia is caused by lifestyle choices and diseases that lead to dyslipidemia, such as:  · Eating a diet that is high in animal fat.  · Not getting enough exercise.  · Having diabetes, kidney disease, liver disease, or thyroid disease.  · Drinking large amounts of alcohol.  · Using certain medicines.  What increases the risk?  You are more likely to develop this condition if you are an older man or if you are a woman who has gone through menopause. Other risk factors include:  · Having a family history of dyslipidemia.  · Taking certain medicines, including birth control pills, steroids,  some diuretics, and beta-blockers.  · Smoking cigarettes.  · Eating a high-fat diet.  · Having certain medical conditions such as diabetes, polycystic ovary syndrome (PCOS), kidney disease, liver disease, or hypothyroidism.  · Not exercising regularly.  · Being overweight or obese with too much belly fat.  What are the signs or symptoms?  In most cases, dyslipidemia does not usually cause any symptoms.  In severe cases, very high lipid levels can cause:  · Fatty bumps under the skin (xanthomas).  · White or gray ring around the black center (pupil) of the eye.  Very high triglyceride levels can cause inflammation of the pancreas (pancreatitis).  How is this diagnosed?  Your health care provider may diagnose dyslipidemia based on a routine blood test (fasting blood test). Because most people do not have symptoms of the condition, this blood testing (lipid profile) is done on adults age 20 and older and is repeated every 5 years. This test checks:  · Total cholesterol. This measures the total amount of cholesterol in your blood, including LDL cholesterol, HDL cholesterol, and triglycerides. A healthy number is below 200.  · LDL cholesterol. The target number for LDL cholesterol is different for each person, depending on individual risk factors. Ask your health care provider what your LDL cholesterol should be.  · HDL cholesterol. An HDL level of 60 or higher is best because it helps to protect against heart disease. A number below 40 for men or below 50 for women increases the risk for heart disease.  · Triglycerides. A healthy triglyceride number is below 150.  If your lipid profile is abnormal, your health care provider may do other blood tests.  How is this treated?  Treatment depends on the type of dyslipidemia that you have and your other risk factors for heart disease and stroke. Your health care provider will have a target range for your lipid levels based on this information.  For many people, this condition  may be treated by lifestyle changes, such as diet and exercise. Your health care provider may recommend that you:  · Get regular exercise.  · Make changes to your diet.  · Quit smoking if you smoke.  If diet changes and exercise do not help you reach your goals, your health care provider may also prescribe medicine to lower lipids. The most commonly prescribed type of medicine lowers your LDL cholesterol (statin drug). If you have a high triglyceride level, your provider may prescribe another type of drug (fibrate) or an omega-3 fish oil supplement, or both.  Follow these instructions at home:    Eating and drinking  · Follow instructions from your health care provider or dietitian about eating or drinking restrictions.  · Eat a healthy diet as told by your health care provider. This can help you reach and maintain a healthy weight, lower your LDL cholesterol, and raise your HDL cholesterol. This may include:  ? Limiting your calories, if you are overweight.  ? Eating more fruits, vegetables, whole grains, fish, and lean meats.  ? Limiting saturated fat, trans fat, and cholesterol.  · If you drink alcohol:  ? Limit how much you use.  ? Be aware of how much alcohol is in your drink. In the [place] one drink equals one 12 oz bottle of beer (355 mL), one 5 oz glass of wine (148 mL), or one 1½ oz glass of hard liquor (44 mL).  · Do not drink alcohol if:  ? Your health care provider tells you not to drink.  ? You are pregnant, may be pregnant, or are planning to become pregnant.  Activity  · Get regular exercise. Start an exercise and strength training program as told by your health care provider. Ask your health care provider what activities are safe for you. Your health care provider may recommend:  ? 30 minutes of aerobic activity 4-6 days a week. Brisk walking is an example of aerobic activity.  ? Strength training 2 days a week.  General instructions  · Do not use any products that contain nicotine or tobacco, such as  cigarettes, e-cigarettes, and chewing tobacco. If you need help quitting, ask your health care provider.  · Take over-the-counter and prescription medicines only as told by your health care provider. This includes supplements.  · Keep all follow-up visits as told by your health care provider.  Contact a health care provider if:  · You are:  ? Having trouble sticking to your exercise or diet plan.  ? Struggling to quit smoking or control your use of alcohol.  Summary  · Dyslipidemia often involves a high level of cholesterol or triglycerides, which are types of lipids.  · Treatment depends on the type of dyslipidemia that you have and your other risk factors for heart disease and stroke.  · For many people, treatment starts with lifestyle changes, such as diet and exercise.  · Your health care provider may prescribe medicine to lower lipids.  This information is not intended to replace advice given to you by your health care provider. Make sure you discuss any questions you have with your health care provider.  Document Revised: 08/12/2019 Document Reviewed: 07/19/2019  [employer] Patient Education © 2021 [employer]    Health Maintenance, Female  Adopting a healthy lifestyle and getting preventive care are important in promoting health and wellness. Ask your health care provider about:  · The right schedule for you to have regular tests and exams.  · Things you can do on your own to prevent diseases and keep yourself healthy.  What should I know about diet, weight, and exercise?  Eat a healthy diet    · Eat a diet that includes plenty of vegetables, fruits, low-fat dairy products, and lean protein.  · Do not eat a lot of foods that are high in solid fats, added sugars, or sodium.    Maintain a healthy weight  Body mass index (BMI) is used to identify weight problems. It estimates body fat based on height and weight. Your health care provider can help determine your BMI and help you achieve or maintain a healthy  weight.  Get regular exercise  Get regular exercise. This is one of the most important things you can do for your health. Most adults should:  · Exercise for at least 150 minutes each week. The exercise should increase your heart rate and make you sweat (moderate-intensity exercise).  · Do strengthening exercises at least twice a week. This is in addition to the moderate-intensity exercise.  · Spend less time sitting. Even light physical activity can be beneficial.  Watch cholesterol and blood lipids  Have your blood tested for lipids and cholesterol at 20 years of age, then have this test every 5 years.  Have your cholesterol levels checked more often if:  · Your lipid or cholesterol levels are high.  · You are older than 40 years of age.  · You are at high risk for heart disease.  What should I know about cancer screening?  Depending on your health history and family history, you may need to have cancer screening at various ages. This may include screening for:  · Breast cancer.  · Cervical cancer.  · Colorectal cancer.  · Skin cancer.  · Lung cancer.  What should I know about heart disease, diabetes, and high blood pressure?  Blood pressure and heart disease  · High blood pressure causes heart disease and increases the risk of stroke. This is more likely to develop in people who have high blood pressure readings, are of  descent, or are overweight.  · Have your blood pressure checked:  ? Every 3-5 years if you are 18-39 years of age.  ? Every year if you are 40 years old or older.  Diabetes  Have regular diabetes screenings. This checks your fasting blood sugar level. Have the screening done:  · Once every three years after age 40 if you are at a normal weight and have a low risk for diabetes.  · More often and at a younger age if you are overweight or have a high risk for diabetes.  What should I know about preventing infection?  Hepatitis B  If you have a higher risk for hepatitis B, you should be  screened for this virus. Talk with your health care provider to find out if you are at risk for hepatitis B infection.  Hepatitis C  Testing is recommended for:  · Everyone born from 1945 through 1965.  · Anyone with known risk factors for hepatitis C.  Sexually transmitted infections (STIs)  · Get screened for STIs, including gonorrhea and chlamydia, if:  ? You are sexually active and are younger than 24 years of age.  ? You are older than 24 years of age and your health care provider tells you that you are at risk for this type of infection.  ? Your sexual activity has changed since you were last screened, and you are at increased risk for chlamydia or gonorrhea. Ask your health care provider if you are at risk.  · Ask your health care provider about whether you are at high risk for HIV. Your health care provider may recommend a prescription medicine to help prevent HIV infection. If you choose to take medicine to prevent HIV, you should first get tested for HIV. You should then be tested every 3 months for as long as you are taking the medicine.  Pregnancy  · If you are about to stop having your period (premenopausal) and you may become pregnant, seek counseling before you get pregnant.  · Take 400 to 800 micrograms (mcg) of folic acid every day if you become pregnant.  · Ask for birth control (contraception) if you want to prevent pregnancy.  Osteoporosis and menopause  Osteoporosis is a disease in which the bones lose minerals and strength with aging. This can result in bone fractures. If you are 65 years old or older, or if you are at risk for osteoporosis and fractures, ask your health care provider if you should:  · Be screened for bone loss.  · Take a calcium or vitamin D supplement to lower your risk of fractures.  · Be given hormone replacement therapy (HRT) to treat symptoms of menopause.  Follow these instructions at home:  Lifestyle  · Do not use any products that contain nicotine or tobacco, such as  cigarettes, e-cigarettes, and chewing tobacco. If you need help quitting, ask your health care provider.  · Do not use street drugs.  · Do not share needles.  · Ask your health care provider for help if you need support or information about quitting drugs.  Alcohol use  · Do not drink alcohol if:  ? Your health care provider tells you not to drink.  ? You are pregnant, may be pregnant, or are planning to become pregnant.  · If you drink alcohol:  ? Limit how much you use to 0-1 drink a day.  ? Limit intake if you are breastfeeding.  · Be aware of how much alcohol is in your drink. In the [place] one drink equals one 12 oz bottle of beer (355 mL), one 5 oz glass of wine (148 mL), or one 1½ oz glass of hard liquor (44 mL).  General instructions  · Schedule regular health, dental, and eye exams.  · Stay current with your vaccines.  · Tell your health care provider if:  ? You often feel depressed.  ? You have ever been abused or do not feel safe at home.  Summary  · Adopting a healthy lifestyle and getting preventive care are important in promoting health and wellness.  · Follow your health care provider's instructions about healthy diet, exercising, and getting tested or screened for diseases.  · Follow your health care provider's instructions on monitoring your cholesterol and blood pressure.  This information is not intended to replace advice given to you by your health care provider. Make sure you discuss any questions you have with your health care provider.  Document Revised: 12/11/2019 Document Reviewed: 12/11/2019  [employer] Patient Education © 2021 [employer]    Health Maintenance After Age 65  After age 65, you are at a higher risk for certain long-term diseases and infections as well as injuries from falls. Falls are a major cause of broken bones and head injuries in people who are older than age 65. Getting regular preventive care can help to keep you healthy and well. Preventive care includes getting  regular testing and making lifestyle changes as recommended by your health care provider. Talk with your health care provider about:  · Which screenings and tests you should have. A screening is a test that checks for a disease when you have no symptoms.  · A diet and exercise plan that is right for you.  What should I know about screenings and tests to prevent falls?  Screening and testing are the best ways to find a health problem early. Early diagnosis and treatment give you the best chance of managing medical conditions that are common after age 65. Certain conditions and lifestyle choices may make you more likely to have a fall. Your health care provider may recommend:  · Regular vision checks. Poor vision and conditions such as cataracts can make you more likely to have a fall. If you wear glasses, make sure to get your prescription updated if your vision changes.  · Medicine review. Work with your health care provider to regularly review all of the medicines you are taking, including over-the-counter medicines. Ask your health care provider about any side effects that may make you more likely to have a fall. Tell your health care provider if any medicines that you take make you feel dizzy or sleepy.  · Osteoporosis screening. Osteoporosis is a condition that causes the bones to get weaker. This can make the bones weak and cause them to break more easily.  · Blood pressure screening. Blood pressure changes and medicines to control blood pressure can make you feel dizzy.  · Strength and balance checks. Your health care provider may recommend certain tests to check your strength and balance while standing, walking, or changing positions.  · Foot health exam. Foot pain and numbness, as well as not wearing proper footwear, can make you more likely to have a fall.  · Depression screening. You may be more likely to have a fall if you have a fear of falling, feel emotionally low, or feel unable to do activities that  you used to do.  · Alcohol use screening. Using too much alcohol can affect your balance and may make you more likely to have a fall.  What actions can I take to lower my risk of falls?  General instructions  · Talk with your health care provider about your risks for falling. Tell your health care provider if:  ? You fall. Be sure to tell your health care provider about all falls, even ones that seem minor.  ? You feel dizzy, sleepy, or off-balance.  · Take over-the-counter and prescription medicines only as told by your health care provider. These include any supplements.  · Eat a healthy diet and maintain a healthy weight. A healthy diet includes low-fat dairy products, low-fat (lean) meats, and fiber from whole grains, beans, and lots of fruits and vegetables.  Home safety  · Remove any tripping hazards, such as rugs, cords, and clutter.  · Install safety equipment such as grab bars in bathrooms and safety rails on stairs.  · Keep rooms and walkways well-lit.  Activity    · Follow a regular exercise program to stay fit. This will help you maintain your balance. Ask your health care provider what types of exercise are appropriate for you.  · If you need a cane or walker, use it as recommended by your health care provider.  · Wear supportive shoes that have nonskid soles.    Lifestyle  · Do not drink alcohol if your health care provider tells you not to drink.  · If you drink alcohol, limit how much you have:  ? 0-1 drink a day for women.  ? 0-2 drinks a day for men.  · Be aware of how much alcohol is in your drink. In the [place] one drink equals one typical bottle of beer (12 oz), one-half glass of wine (5 oz), or one shot of hard liquor (1½ oz).  · Do not use any products that contain nicotine or tobacco, such as cigarettes and e-cigarettes. If you need help quitting, ask your health care provider.  Summary  · Having a healthy lifestyle and getting preventive care can help to protect your health and wellness after  age 65.  · Screening and testing are the best way to find a health problem early and help you avoid having a fall. Early diagnosis and treatment give you the best chance for managing medical conditions that are more common for people who are older than age 65.  · Falls are a major cause of broken bones and head injuries in people who are older than age 65. Take precautions to prevent a fall at home.  · Work with your health care provider to learn what changes you can make to improve your health and wellness and to prevent falls.  This information is not intended to replace advice given to you by your health care provider. Make sure you discuss any questions you have with your health care provider.  Document Revised: 04/09/2020 Document Reviewed: 10/31/2018  [employer] Patient Education © 2021 [employer]    Health Maintenance for Postmenopausal Women  Menopause is a normal process in which your ability to get pregnant comes to an end. This process happens slowly over many months or years, usually between the ages of 48 and 55. Menopause is complete when you have missed your menstrual periods for 12 months.  It is important to talk with your health care provider about some of the most common conditions that affect women after menopause (postmenopausal women). These include heart disease, cancer, and bone loss (osteoporosis). Adopting a healthy lifestyle and getting preventive care can help to promote your health and wellness. The actions you take can also lower your chances of developing some of these common conditions.  What should I know about menopause?  During menopause, you may get a number of symptoms, such as:  · Hot flashes. These can be moderate or severe.  · Night sweats.  · Decrease in sex drive.  · Mood swings.  · Headaches.  · Tiredness.  · Irritability.  · Memory problems.  · Insomnia.  Choosing to treat or not to treat these symptoms is a decision that you make with your health care provider.  Do I  need hormone replacement therapy?  · Hormone replacement therapy is effective in treating symptoms that are caused by menopause, such as hot flashes and night sweats.  · Hormone replacement carries certain risks, especially as you become older. If you are thinking about using estrogen or estrogen with progestin, discuss the benefits and risks with your health care provider.  What is my risk for heart disease and stroke?  The risk of heart disease, heart attack, and stroke increases as you age. One of the causes may be a change in the body's hormones during menopause. This can affect how your body uses dietary fats, triglycerides, and cholesterol. Heart attack and stroke are medical emergencies. There are many things that you can do to help prevent heart disease and stroke.  Watch your blood pressure  · High blood pressure causes heart disease and increases the risk of stroke. This is more likely to develop in people who have high blood pressure readings, are of  descent, or are overweight.  · Have your blood pressure checked:  ? Every 3-5 years if you are 18-39 years of age.  ? Every year if you are 40 years old or older.  Eat a healthy diet    · Eat a diet that includes plenty of vegetables, fruits, low-fat dairy products, and lean protein.  · Do not eat a lot of foods that are high in solid fats, added sugars, or sodium.    Get regular exercise  Get regular exercise. This is one of the most important things you can do for your health. Most adults should:  · Try to exercise for at least 150 minutes each week. The exercise should increase your heart rate and make you sweat (moderate-intensity exercise).  · Try to do strengthening exercises at least twice each week. Do these in addition to the moderate-intensity exercise.  · Spend less time sitting. Even light physical activity can be beneficial.  Other tips  · Work with your health care provider to achieve or maintain a healthy weight.  · Do not use any  products that contain nicotine or tobacco, such as cigarettes, e-cigarettes, and chewing tobacco. If you need help quitting, ask your health care provider.  · Know your numbers. Ask your health care provider to check your cholesterol and your blood sugar (glucose). Continue to have your blood tested as directed by your health care provider.  Do I need screening for cancer?  Depending on your health history and family history, you may need to have cancer screening at different stages of your life. This may include screening for:  · Breast cancer.  · Cervical cancer.  · Lung cancer.  · Colorectal cancer.  What is my risk for osteoporosis?  After menopause, you may be at increased risk for osteoporosis. Osteoporosis is a condition in which bone destruction happens more quickly than new bone creation. To help prevent osteoporosis or the bone fractures that can happen because of osteoporosis, you may take the following actions:  · If you are 19-50 years old, get at least 1,000 mg of calcium and at least 600 mg of vitamin D per day.  · If you are older than age 50 but younger than age 70, get at least 1,200 mg of calcium and at least 600 mg of vitamin D per day.  · If you are older than age 70, get at least 1,200 mg of calcium and at least 800 mg of vitamin D per day.  Smoking and drinking excessive alcohol increase the risk of osteoporosis. Eat foods that are rich in calcium and vitamin D, and do weight-bearing exercises several times each week as directed by your health care provider.  How does menopause affect my mental health?  Depression may occur at any age, but it is more common as you become older. Common symptoms of depression include:  · Low or sad mood.  · Changes in sleep patterns.  · Changes in appetite or eating patterns.  · Feeling an overall lack of motivation or enjoyment of activities that you previously enjoyed.  · Frequent crying spells.  Talk with your health care provider if you think that you are  experiencing depression.  General instructions  See your health care provider for regular wellness exams and vaccines. This may include:  · Scheduling regular health, dental, and eye exams.  · Getting and maintaining your vaccines. These include:  ? Influenza vaccine. Get this vaccine each year before the flu season begins.  ? Pneumonia vaccine.  ? Shingles vaccine.  ? Tetanus, diphtheria, and pertussis (Tdap) booster vaccine.  Your health care provider may also recommend other immunizations.  Tell your health care provider if you have ever been abused or do not feel safe at home.  Summary  · Menopause is a normal process in which your ability to get pregnant comes to an end.  · This condition causes hot flashes, night sweats, decreased interest in sex, mood swings, headaches, or lack of sleep.  · Treatment for this condition may include hormone replacement therapy.  · Take actions to keep yourself healthy, including exercising regularly, eating a healthy diet, watching your weight, and checking your blood pressure and blood sugar levels.  · Get screened for cancer and depression. Make sure that you are up to date with all your vaccines.  This information is not intended to replace advice given to you by your health care provider. Make sure you discuss any questions you have with your health care provider.  Document Revised: 12/11/2019 Document Reviewed: 12/11/2019  [employer] Patient Education © 2021 [employer]    High Cholesterol    High cholesterol is a condition in which the blood has high levels of a white, waxy substance similar to fat (cholesterol). The liver makes all the cholesterol that the body needs. The human body needs small amounts of cholesterol to help build cells. A person gets extra or excess cholesterol from the food that he or she eats.  The blood carries cholesterol from the liver to the rest of the body. If you have high cholesterol, deposits (plaques) may build up on the walls of your  "arteries. Arteries are the blood vessels that carry blood away from your heart. These plaques make the arteries narrow and stiff.  Cholesterol plaques increase your risk for heart attack and stroke. Work with your health care provider to keep your cholesterol levels in a healthy range.  What increases the risk?  The following factors may make you more likely to develop this condition:  · Eating foods that are high in animal fat (saturated fat) or cholesterol.  · Being overweight.  · Not getting enough exercise.  · A family history of high cholesterol (familial hypercholesterolemia).  · Use of tobacco products.  · Having diabetes.  What are the signs or symptoms?  There are no symptoms of this condition.  How is this diagnosed?  This condition may be diagnosed based on the results of a blood test.  · If you are older than 20 years of age, your health care provider may check your cholesterol levels every 4-6 years.  · You may be checked more often if you have high cholesterol or other risk factors for heart disease.  The blood test for cholesterol measures:  · \"Bad\" cholesterol, or LDL cholesterol. This is the main type of cholesterol that causes heart disease. The desired level is less than 100 mg/dL.  · \"Good\" cholesterol, or HDL cholesterol. HDL helps protect against heart disease by cleaning the arteries and carrying the LDL to the liver for processing. The desired level for HDL is 60 mg/dL or higher.  · Triglycerides. These are fats that your body can store or burn for energy. The desired level is less than 150 mg/dL.  · Total cholesterol. This measures the total amount of cholesterol in your blood and includes LDL, HDL, and triglycerides. The desired level is less than 200 mg/dL.  How is this treated?  This condition may be treated with:  · Diet changes. You may be asked to eat foods that have more fiber and less saturated fats or added sugar.  · Lifestyle changes. These may include regular exercise, maintaining " a healthy weight, and quitting use of tobacco products.  · Medicines. These are given when diet and lifestyle changes have not worked. You may be prescribed a statin medicine to help lower your cholesterol levels.  Follow these instructions at home:  Eating and drinking    · Eat a healthy, balanced diet. This diet includes:  ? Daily servings of a variety of fresh, frozen, or canned fruits and vegetables.  ? Daily servings of whole grain foods that are rich in fiber.  ? Foods that are low in saturated fats and trans fats. These include poultry and fish without skin, lean cuts of meat, and low-fat dairy products.  ? A variety of fish, especially oily fish that contain omega-3 fatty acids. Aim to eat fish at least 2 times a week.  · Avoid foods and drinks that have added sugar.  · Use healthy cooking methods, such as roasting, grilling, broiling, baking, poaching, steaming, and stir-frying. Do not marroquin your food except for stir-frying.    Lifestyle    · Get regular exercise. Aim to exercise for a total of 150 minutes a week. Increase your activity level by doing activities such as gardening, walking, and taking the stairs.  · Do not use any products that contain nicotine or tobacco, such as cigarettes, e-cigarettes, and chewing tobacco. If you need help quitting, ask your health care provider.    General instructions  · Take over-the-counter and prescription medicines only as told by your health care provider.  · Keep all follow-up visits as told by your health care provider. This is important.  Where to find more information  · American Heart Association: www.heart.org  · National Heart, Lung, and Blood [place]: www.nhlbi.nih.gov  Contact a health care provider if:  · You have trouble achieving or maintaining a healthy diet or weight.  · You are starting an exercise program.  · You are unable to stop smoking.  Get help right away if:  · You have chest pain.  · You have trouble breathing.  · You have any symptoms of a  "stroke. \"BE FAST\" is an easy way to remember the main warning signs of a stroke:  ? B - Balance. Signs are dizziness, sudden trouble walking, or loss of balance.  ? E - Eyes. Signs are trouble seeing or a sudden change in vision.  ? F - Face. Signs are sudden weakness or numbness of the face, or the face or eyelid drooping on one side.  ? A - Arms. Signs are weakness or numbness in an arm. This happens suddenly and usually on one side of the body.  ? S - Speech. Signs are sudden trouble speaking, slurred speech, or trouble understanding what people say.  ? T - Time. Time to call emergency services. Write down what time symptoms started.  · You have other signs of a stroke, such as:  ? A sudden, severe headache with no known cause.  ? Nausea or vomiting.  ? Seizure.  These symptoms may represent a serious problem that is an emergency. Do not wait to see if the symptoms will go away. Get medical help right away. Call your local emergency services (911 in the [place] Do not drive yourself to the hospital.  Summary  · Cholesterol plaques increase your risk for heart attack and stroke. Work with your health care provider to keep your cholesterol levels in a healthy range.  · Eat a healthy, balanced diet, get regular exercise, and maintain a healthy weight.  · Do not use any products that contain nicotine or tobacco, such as cigarettes, e-cigarettes, and chewing tobacco.  · Get help right away if you have any symptoms of a stroke.  This information is not intended to replace advice given to you by your health care provider. Make sure you discuss any questions you have with your health care provider.  Document Revised: 11/16/2020 Document Reviewed: 11/16/2020  [employer] Patient Education © 2021 [employer]    Preventing High Cholesterol  Cholesterol is a white, waxy substance similar to fat that the human body needs to help build cells. The liver makes all the cholesterol that a person's body needs. Having high cholesterol " (hypercholesterolemia) increases your risk for heart disease and stroke. Extra or excess cholesterol comes from the food that you eat.  High cholesterol can often be prevented with diet and lifestyle changes. If you already have high cholesterol, you can control it with diet, lifestyle changes, and medicines.  How can high cholesterol affect me?  If you have high cholesterol, fatty deposits (plaques) may build up on the walls of your blood vessels. The blood vessels that carry blood away from your heart are called arteries. Plaques make the arteries narrower and stiffer. This in turn can:  · Restrict or block blood flow and cause blood clots to form.  · Increase your risk for heart attack and stroke.  What can increase my risk for high cholesterol?  This condition is more likely to develop in people who:  · Eat foods that are high in saturated fat or cholesterol. Saturated fat is mostly found in foods that come from animal sources.  · Are overweight.  · Are not getting enough exercise.  · Have a family history of high cholesterol (familial hypercholesterolemia).  What actions can I take to prevent this?  Nutrition    · Eat less saturated fat.  · Avoid trans fats (partially hydrogenated oils). These are often found in margarine and in some baked goods, fried foods, and snacks bought in packages.  · Avoid precooked or cured meat, such as oyst, sausages, or meat loaves.  · Avoid foods and drinks that have added sugars.  · Eat more fruits, vegetables, and whole grains.  · Choose healthy sources of protein, such as fish, poultry, lean cuts of red meat, beans, peas, lentils, and nuts.  · Choose healthy sources of fat, such as:  ? Nuts.  ? Vegetable oils, especially olive oil.  ? Fish that have healthy fats, such as omega-3 fatty acids. These fish include mackerel or salmon.    Lifestyle  · Lose weight if you are overweight. Maintaining a healthy body mass index (BMI) can help prevent or control high cholesterol. It can  also lower your risk for diabetes and high blood pressure. Ask your health care provider to help you with a diet and exercise plan to lose weight safely.  · Do not use any products that contain nicotine or tobacco, such as cigarettes, e-cigarettes, and chewing tobacco. If you need help quitting, ask your health care provider.  Alcohol use  · Do not drink alcohol if:  ? Your health care provider tells you not to drink.  ? You are pregnant, may be pregnant, or are planning to become pregnant.  · If you drink alcohol:  ? Limit how much you use to:  § 0-1 drink a day for women.  § 0-2 drinks a day for men.  ? Be aware of how much alcohol is in your drink. In the [place] one drink equals one 12 oz bottle of beer (355 mL), one 5 oz glass of wine (148 mL), or one 1½ oz glass of hard liquor (44 mL).  Activity    · Get enough exercise. Do exercises as told by your health care provider.  · Each week, do at least 150 minutes of exercise that takes a medium level of effort (moderate-intensity exercise). This kind of exercise:  ? Makes your heart beat faster while allowing you to still be able to talk.  ? Can be done in short sessions several times a day or longer sessions a few times a week. For example, on 5 days each week, you could walk fast or ride your bike 3 times a day for 10 minutes each time.    Medicines  · Your health care provider may recommend medicines to help lower cholesterol. This may be a medicine to lower the amount of cholesterol that your liver makes. You may need medicine if:  ? Diet and lifestyle changes have not lowered your cholesterol enough.  ? You have high cholesterol and other risk factors for heart disease or stroke.  · Take over-the-counter and prescription medicines only as told by your health care provider.  General information  · Manage your risk factors for high cholesterol. Talk with your health care provider about all your risk factors and how to lower your risk.  · Manage other conditions  that you have, such as diabetes or high blood pressure (hypertension).  · Have blood tests to check your cholesterol levels at regular points in time as told by your health care provider.  · Keep all follow-up visits as told by your health care provider. This is important.  Where to find more information  · American Heart Association: www.heart.org  · National Heart, Lung, and Blood [place]: www.nhlbi.nih.gov  Summary  · High cholesterol increases your risk for heart disease and stroke. By keeping your cholesterol level low, you can reduce your risk for these conditions.  · High cholesterol can often be prevented with diet and lifestyle changes.  · Work with your health care provider to manage your risk factors, and have your blood tested regularly.  This information is not intended to replace advice given to you by your health care provider. Make sure you discuss any questions you have with your health care provider.  Document Revised: 09/29/2020 Document Reviewed: 09/29/2020  [employer] Patient Education © 2021 [employer]

## 2021-09-24 NOTE — PROGRESS NOTES
The ABCs of the Annual Wellness Visit  Subsequent Medicare Wellness Visit    Chief Complaint   Patient presents with   • Medicare Wellness-subsequent   • Cough     cough possibly de to sinus drainage      Subjective    History of Present Illness:  Chris Moreno is a 66 y.o. female who presents for a Subsequent Medicare Wellness Visit.    The following portions of the patient's history were reviewed and   updated as appropriate: allergies, current medications, past family history, past medical history, past social history, past surgical history and problem list.    Compared to one year ago, the patient feels her physical   health is the same.    Compared to one year ago, the patient feels her mental   health is the same.    Recent Hospitalizations:  She was not admitted to the hospital during the last year.       Current Medical Providers:  Patient Care Team:  Boyd Abarca MD as PCP - General (Family Medicine)  Gilbert Perez MD as Consulting Physician (Hematology and Oncology)    Outpatient Medications Prior to Visit   Medication Sig Dispense Refill   • alendronate (Fosamax) 70 MG tablet Take 1 tablet by mouth Every 7 (Seven) Days. Take on empty stomach with full glass of water. For the next 30 min  Do not bend over, eat or lay down. 12 tablet 3   • Calcium Citrate (CITRACAL PO) Take  by mouth Daily.     • cetirizine (ZYRTEC ALLERGY) 10 MG tablet      • Cholecalciferol (VITAMIN D) 2000 UNITS capsule Take 2,000 Units by mouth daily.     • ezetimibe (ZETIA) 10 MG tablet Take 1 tablet by mouth Daily. 90 tablet 2   • fluticasone (FLONASE) 50 MCG/ACT nasal spray 2 sprays into the nostril(s) as directed by provider Daily. 16 g 11   • ketoconazole (NIZORAL) 2 % cream Apply  topically to the appropriate area as directed Daily.     • olopatadine (PATADAY) 0.2 % solution ophthalmic solution ADMINISTER 1 DROP TO BOTH EYES DAILY. 2.5 mL 11   • rosuvastatin (CRESTOR) 20 MG tablet TAKE 1 TABLET BY MOUTH EVERY DAY 90 tablet  "3   • Unable to find 1 each Daily. Diferil, acne treatment     • Coenzyme Q10 200 MG capsule        No facility-administered medications prior to visit.       No opioid medication identified on active medication list. I have reviewed chart for other potential  high risk medication/s and harmful drug interactions in the elderly.          Aspirin is not on active medication list.  Aspirin use is not indicated based on review of current medical condition/s. Risk of harm outweighs potential benefits.  .    Patient Active Problem List   Diagnosis   • Atopic rhinitis   • Pure hypercholesterolemia   • Keratoconjunctivitis sicca (CMS/HCC)   • Osteopenia   • Varicose veins   • Vitamin D deficiency   • Health care maintenance   • History of breast cancer   • History of melanoma   • Impaired fasting blood sugar   • Medicare annual wellness visit, subsequent   • Seasonal allergies     Advance Care Planning  Advance Directive is not on file.  ACP discussion was held with the patient during this visit. Patient has an advance directive (not in EMR), copy requested.          Objective    Vitals:    09/24/21 0907   BP: 112/70   Pulse: 80   Temp: 98.2 °F (36.8 °C)   SpO2: 98%   Weight: 68.5 kg (151 lb)   Height: 161.9 cm (63.75\")   PainSc: 0-No pain  Comment: does have some tenderness rib under right breast     BMI Readings from Last 1 Encounters:   09/24/21 26.12 kg/m²   BMI is above normal parameters. Recommendations include: exercise counseling and nutrition counseling    Does the patient have evidence of cognitive impairment? No    Physical Exam  Vitals and nursing note reviewed.   Constitutional:       Appearance: Normal appearance. She is normal weight.   HENT:      Head: Normocephalic and atraumatic.   Eyes:      Extraocular Movements: Extraocular movements intact.      Conjunctiva/sclera: Conjunctivae normal.      Pupils: Pupils are equal, round, and reactive to light.   Cardiovascular:      Rate and Rhythm: Normal rate and " regular rhythm.      Pulses: Normal pulses.      Heart sounds: No murmur heard.   No friction rub. No gallop.    Pulmonary:      Effort: Pulmonary effort is normal. No respiratory distress.      Breath sounds: Normal breath sounds. No stridor. No wheezing, rhonchi or rales.   Abdominal:      General: Bowel sounds are normal. There is no distension.      Palpations: Abdomen is soft.      Tenderness: There is no abdominal tenderness.   Musculoskeletal:         General: Normal range of motion.      Cervical back: Normal range of motion and neck supple.   Skin:     General: Skin is warm and dry.          Neurological:      General: No focal deficit present.      Mental Status: She is alert and oriented to person, place, and time. Mental status is at baseline.   Psychiatric:         Mood and Affect: Mood normal.         Behavior: Behavior normal.         Thought Content: Thought content normal.         Judgment: Judgment normal.       Lab Results   Component Value Date    GLU 97 09/13/2021    CHLPL 188 09/13/2021    TRIG 122 09/13/2021    HDL 44 09/13/2021     (H) 09/13/2021    VLDL 22 09/13/2021            HEALTH RISK ASSESSMENT    Smoking Status:  Social History     Tobacco Use   Smoking Status Never Smoker   Smokeless Tobacco Never Used     Alcohol Consumption:  Social History     Substance and Sexual Activity   Alcohol Use Yes    Comment: social     Fall Risk Screen:    [facility], and patient is at LOW risk for falls.Assessment completed on:9/24/2021    Depression Screening:  PHQ-2/PHQ-9 Depression Screening 9/24/2021   Little interest or pleasure in doing things 0   Feeling down, depressed, or hopeless 0   Trouble falling or staying asleep, or sleeping too much 0   Feeling tired or having little energy 0   Poor appetite or overeating 0   Feeling bad about yourself - or that you are a failure or have let yourself or your family down 0   Trouble concentrating on things, such as  reading the newspaper or watching television 0   Moving or speaking so slowly that other people could have noticed. Or the opposite - being so fidgety or restless that you have been moving around a lot more than usual 0   Thoughts that you would be better off dead, or of hurting yourself in some way 0   Total Score 0   If you checked off any problems, how difficult have these problems made it for you to do your work, take care of things at home, or get along with other people? -       Health Habits and Functional and Cognitive Screening:  Functional & Cognitive Status 9/24/2021   Do you have difficulty preparing food and eating? No   Do you have difficulty bathing yourself, getting dressed or grooming yourself? No   Do you have difficulty using the toilet? No   Do you have difficulty moving around from place to place? No   Do you have trouble with steps or getting out of a bed or a chair? No   Current Diet Well Balanced Diet   Dental Exam Up to date   Eye Exam Not up to date   Exercise (times per week) 3 times per week   Current Exercises Include Aerobics;Gardening        Exercise Comment water   Current Exercise Activities Include -   Do you need help using the phone?  No   Are you deaf or do you have serious difficulty hearing?  No   Do you need help with transportation? No   Do you need help shopping? No   Do you need help preparing meals?  No   Do you need help with housework?  No   Do you need help with laundry? No   Do you need help taking your medications? No   Do you need help managing money? No   Do you ever drive or ride in a car without wearing a seat belt? No   Have you felt unusual stress, anger or loneliness in the last month? No   Who do you live with? Spouse   If you need help, do you have trouble finding someone available to you? No   Have you been bothered in the last four weeks by sexual problems? No   Do you have difficulty concentrating, remembering or making decisions? No       Age-appropriate  Screening Schedule:  Refer to the list below for future screening recommendations based on patient's age, sex and/or medical conditions. Orders for these recommended tests are listed in the plan section. The patient has been provided with a written plan.    Health Maintenance   Topic Date Due   • TDAP/TD VACCINES (2 - Tdap) 01/01/2021   • DXA SCAN  02/19/2022   • MAMMOGRAM  09/01/2022   • LIPID PANEL  09/13/2022   • ZOSTER VACCINE  Addressed   • INFLUENZA VACCINE  Discontinued   • PAP SMEAR  Discontinued              Assessment/Plan   CMS Preventative Services Quick Reference  Risk Factors Identified During Encounter  Cardiovascular Disease  Glaucoma or Family History of Glaucoma  Obesity/Overweight   The above risks/problems have been discussed with the patient.  Follow up actions/plans if indicated are seen below in the Assessment/Plan Section.  Pertinent information has been shared with the patient in the After Visit Summary.    Diagnoses and all orders for this visit:    1. Medicare annual wellness visit, subsequent (Primary)  Assessment & Plan:  Encouraged 150 minutes weekly exercise.   Continue with healthy heart lowered calorie diet.   Labs completed and reviewed.   Patient should continue self monitoring/self monthly breast examinations.   Anticipatory guidance given regarding health prevention/wellness, diet/exercise, tobacco/alcohol/drug education, exercise and wellbeing, covid 19 guidance, and sexual health/STD education.   Patient last PAP smear 2020 with Dr. Mccurdy.       2. Pure hypercholesterolemia  Assessment & Plan:  Lipid abnormalities are unchanged.  Nutritional counseling was provided. and Pharmacotherapy as ordered.  Lipids will be reassessed in 6 months.  Patient should continue on statin and ezetimibe.     Orders:  -     Lipid panel; Future    3. Osteopenia, unspecified location  Assessment & Plan:  Patient should continue on fosamax.   Counseling given regarding medication-- patient should sit  up at least 30 minutes to 1 hour after taking medication.       4. History of melanoma  Assessment & Plan:  Patient should continue following [facility]   Patient just had body mapping completed within the past month.   Patient had recent procedure to left nasal fold-- awaiting final results.       5. History of breast cancer  Assessment & Plan:  Patient had right breast reconstruction post right mastectomy-- patient reporting mild tenderness to area when laying down.   Will refer to Dr. Hernandez for further evaluation regarding this.   Patient should continue with monthly breast examinations.   Mammogram completed August 2021.     Orders:  -     Ambulatory Referral to Breast Surgery    6. Vitamin D deficiency  Assessment & Plan:  Patient should continue with vitamin D supplementation.       7. Seasonal allergies  Assessment & Plan:  Continue on zyrtec and flonase.         Follow Up:   Return in about 6 months (around 3/24/2022).     An After Visit Summary and PPPS were made available to the patient.

## 2021-09-24 NOTE — ASSESSMENT & PLAN NOTE
Patient should continue following with dermatology at [facility].   Patient just had body mapping completed within the past month.   Patient had recent procedure to left nasal fold-- awaiting final results.

## 2021-09-24 NOTE — ASSESSMENT & PLAN NOTE
Patient had right breast reconstruction post right mastectomy-- patient reporting mild tenderness to area when laying down.   Will refer to Dr. Hernandez for further evaluation regarding this.   Patient should continue with monthly breast examinations.   Mammogram completed August 2021.

## 2021-09-24 NOTE — ASSESSMENT & PLAN NOTE
Encouraged 150 minutes weekly exercise.   Continue with healthy heart lowered calorie diet.   Labs completed and reviewed.   Patient should continue self monitoring/self monthly breast examinations.   Anticipatory guidance given regarding health prevention/wellness, diet/exercise, tobacco/alcohol/drug education, exercise and wellbeing, covid 19 guidance, and sexual health/STD education.   Patient last PAP smear 2020 with Dr. Mccurdy.

## 2021-09-24 NOTE — ASSESSMENT & PLAN NOTE
Lipid abnormalities are unchanged.  Nutritional counseling was provided. and Pharmacotherapy as ordered.  Lipids will be reassessed in 6 months.  Patient should continue on statin and ezetimibe.

## 2021-09-24 NOTE — ASSESSMENT & PLAN NOTE
Patient should continue on fosamax.   Counseling given regarding medication-- patient should sit up at least 30 minutes to 1 hour after taking medication.

## 2021-09-28 ENCOUNTER — TELEPHONE (OUTPATIENT)
Dept: SURGERY | Facility: CLINIC | Age: 66
End: 2021-09-28

## 2021-09-28 NOTE — TELEPHONE ENCOUNTER
New patient appointment with Katty Mejia NP is scheduled on 2/15/2022 @ 8:30am.    Called and spoke to patient, patient expressed v/u of appointment times.    Sent patient a reminder letter in the mail.

## 2021-11-02 DIAGNOSIS — E78.00 PURE HYPERCHOLESTEROLEMIA: ICD-10-CM

## 2021-11-02 RX ORDER — EZETIMIBE 10 MG/1
TABLET ORAL
Qty: 90 TABLET | Refills: 2 | Status: SHIPPED | OUTPATIENT
Start: 2021-11-02 | End: 2022-08-04

## 2021-11-08 ENCOUNTER — TELEPHONE (OUTPATIENT)
Dept: SURGERY | Facility: CLINIC | Age: 66
End: 2021-11-08

## 2021-11-10 ENCOUNTER — LAB (OUTPATIENT)
Dept: LAB | Facility: HOSPITAL | Age: 66
End: 2021-11-10

## 2021-11-10 ENCOUNTER — OFFICE VISIT (OUTPATIENT)
Dept: ONCOLOGY | Facility: CLINIC | Age: 66
End: 2021-11-10

## 2021-11-10 VITALS
SYSTOLIC BLOOD PRESSURE: 134 MMHG | DIASTOLIC BLOOD PRESSURE: 83 MMHG | RESPIRATION RATE: 18 BRPM | TEMPERATURE: 97.5 F | OXYGEN SATURATION: 98 % | HEIGHT: 64 IN | WEIGHT: 149.5 LBS | HEART RATE: 76 BPM | BODY MASS INDEX: 25.52 KG/M2

## 2021-11-10 DIAGNOSIS — Z85.820 HISTORY OF MELANOMA: ICD-10-CM

## 2021-11-10 DIAGNOSIS — Z85.3 HISTORY OF BREAST CANCER: Primary | ICD-10-CM

## 2021-11-10 LAB
ALBUMIN SERPL-MCNC: 4.6 G/DL (ref 3.5–5.2)
ALBUMIN/GLOB SERPL: 1.7 G/DL
ALP SERPL-CCNC: 64 U/L (ref 39–117)
ALT SERPL W P-5'-P-CCNC: 15 U/L (ref 1–33)
ANION GAP SERPL CALCULATED.3IONS-SCNC: 10.5 MMOL/L (ref 5–15)
AST SERPL-CCNC: 17 U/L (ref 1–32)
BASOPHILS # BLD AUTO: 0.04 10*3/MM3 (ref 0–0.2)
BASOPHILS NFR BLD AUTO: 0.7 % (ref 0–1.5)
BILIRUB SERPL-MCNC: 0.7 MG/DL (ref 0–1.2)
BUN SERPL-MCNC: 20 MG/DL (ref 8–23)
BUN/CREAT SERPL: 32.3 (ref 7–25)
CALCIUM SPEC-SCNC: 9.2 MG/DL (ref 8.6–10.5)
CHLORIDE SERPL-SCNC: 107 MMOL/L (ref 98–107)
CO2 SERPL-SCNC: 23.5 MMOL/L (ref 22–29)
CREAT SERPL-MCNC: 0.62 MG/DL (ref 0.57–1)
DEPRECATED RDW RBC AUTO: 41 FL (ref 37–54)
EOSINOPHIL # BLD AUTO: 0.15 10*3/MM3 (ref 0–0.4)
EOSINOPHIL NFR BLD AUTO: 2.7 % (ref 0.3–6.2)
ERYTHROCYTE [DISTWIDTH] IN BLOOD BY AUTOMATED COUNT: 12.6 % (ref 12.3–15.4)
GFR SERPL CREATININE-BSD FRML MDRD: 96 ML/MIN/1.73
GLOBULIN UR ELPH-MCNC: 2.7 GM/DL
GLUCOSE SERPL-MCNC: 103 MG/DL (ref 65–99)
HCT VFR BLD AUTO: 39 % (ref 34–46.6)
HGB BLD-MCNC: 13.4 G/DL (ref 12–15.9)
IMM GRANULOCYTES # BLD AUTO: 0.01 10*3/MM3 (ref 0–0.05)
IMM GRANULOCYTES NFR BLD AUTO: 0.2 % (ref 0–0.5)
LYMPHOCYTES # BLD AUTO: 2.1 10*3/MM3 (ref 0.7–3.1)
LYMPHOCYTES NFR BLD AUTO: 37.8 % (ref 19.6–45.3)
MCH RBC QN AUTO: 30.7 PG (ref 26.6–33)
MCHC RBC AUTO-ENTMCNC: 34.4 G/DL (ref 31.5–35.7)
MCV RBC AUTO: 89.4 FL (ref 79–97)
MONOCYTES # BLD AUTO: 0.51 10*3/MM3 (ref 0.1–0.9)
MONOCYTES NFR BLD AUTO: 9.2 % (ref 5–12)
NEUTROPHILS NFR BLD AUTO: 2.74 10*3/MM3 (ref 1.7–7)
NEUTROPHILS NFR BLD AUTO: 49.4 % (ref 42.7–76)
NRBC BLD AUTO-RTO: 0 /100 WBC (ref 0–0.2)
PLATELET # BLD AUTO: 230 10*3/MM3 (ref 140–450)
PMV BLD AUTO: 11 FL (ref 6–12)
POTASSIUM SERPL-SCNC: 4.2 MMOL/L (ref 3.5–5.2)
PROT SERPL-MCNC: 7.3 G/DL (ref 6–8.5)
RBC # BLD AUTO: 4.36 10*6/MM3 (ref 3.77–5.28)
SODIUM SERPL-SCNC: 141 MMOL/L (ref 136–145)
WBC # BLD AUTO: 5.55 10*3/MM3 (ref 3.4–10.8)

## 2021-11-10 PROCEDURE — 36415 COLL VENOUS BLD VENIPUNCTURE: CPT

## 2021-11-10 PROCEDURE — 99213 OFFICE O/P EST LOW 20 MIN: CPT | Performed by: INTERNAL MEDICINE

## 2021-11-10 PROCEDURE — 80053 COMPREHEN METABOLIC PANEL: CPT | Performed by: INTERNAL MEDICINE

## 2021-11-10 PROCEDURE — 85025 COMPLETE CBC W/AUTO DIFF WBC: CPT

## 2021-11-10 RX ORDER — CEFADROXIL 500 MG/1
CAPSULE ORAL
COMMUNITY
Start: 2021-10-14 | End: 2022-02-15

## 2021-11-10 RX ORDER — TERBINAFINE HYDROCHLORIDE 250 MG/1
250 TABLET ORAL DAILY
COMMUNITY
Start: 2021-09-23 | End: 2022-02-15

## 2021-11-10 NOTE — PROGRESS NOTES
Subjective   REASONS FOR FOLLOWUP:   1. 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   2. Negative genetic testing for BRCA1 and BRCA2 in July 2006.   3. Osteopenia  HISTORY OF PRESENT ILLNESS:   The patient is a 66 y.o. female with the above-noted history of DCIS and microinvasion in the right breast in 2006. She underwent mastectomy and TRAM flap reconstruction. She was started on Evista as a breast cancer prevention and  elected to remain on Evista after completing 5 years of therapy due to the fact that she also has significant osteopenia.  She eventually was taken off Evista by her primary care physician and switched to Fosamax due to further decline in her bone density in 2020.      She is up-to-date on her mammograms with her most recent screening mammogram of the remaining left breast having been performed 9/1/2021.  She has not had any other major health issues this year and looks great in the office today.  She also continues to follow-up with her dermatologist annually for a skin exam due to her past history of melanoma excised from the left lower leg.    Since her last visit here she was recently found to have a small basal cell carcinoma on the left nostril and she is seeing Dr. Brody Calloway for Mohs microsurgery tomorrow.    History of Present Illness      Past Medical History, Past Surgical History, Social History, Family History have been reviewed and are without significant changes except as mentioned.    Review of Systems   Constitutional: Negative for activity change, appetite change, fatigue, fever and unexpected weight change.   HENT: Negative for hearing loss, nosebleeds, trouble swallowing and voice change.    Eyes: Negative for visual disturbance.   Respiratory: Negative for cough, shortness of breath and wheezing.   "  Cardiovascular: Negative for chest pain and palpitations.   Gastrointestinal: Negative for abdominal pain, diarrhea, nausea and vomiting.   Genitourinary: Negative for difficulty urinating, frequency, hematuria and urgency.   Musculoskeletal: Negative for back pain and neck pain.   Skin: Negative for rash.   Neurological: Negative for dizziness, seizures, syncope and headaches.   Hematological: Negative for adenopathy. Does not bruise/bleed easily.   Psychiatric/Behavioral: Negative for behavioral problems. The patient is not nervous/anxious.      A comprehensive 14 point review of systems was performed and was negative except as mentioned.    Medications:  The current medication list was reviewed in the EMR    ALLERGIES:  No Known Allergies    Objective      Vitals:    11/10/21 1007   BP: 134/83   Pulse: 76   Resp: 18   Temp: 97.5 °F (36.4 °C)   TempSrc: Temporal   SpO2: 98%   Weight: 67.8 kg (149 lb 8 oz)   Height: 161.9 cm (63.74\")   PainSc: 0-No pain     Current Status 11/10/2021   ECOG score 0       Physical Exam   Constitutional: She is oriented to person, place, and time. She appears well-developed. No distress.   HENT:   Head: Normocephalic.   Eyes: Pupils are equal, round, and reactive to light.   Neck: No JVD present. No thyromegaly present.   Cardiovascular: Normal rate and regular rhythm. Exam reveals no gallop and no friction rub.   No murmur heard.  Pulmonary/Chest: Effort normal and breath sounds normal. She has no wheezes. She has no rales. Left breast exhibits no mass, no nipple discharge and no skin change.   Right TRAM reconstruction.   Abdominal: Soft. Bowel sounds are normal. She exhibits no distension and no mass. There is no abdominal tenderness.   Musculoskeletal: No deformity.   Neurological: She is alert and oriented to person, place, and time. She has normal reflexes. No cranial nerve deficit.   Skin: Skin is dry. No rash noted.   Small basal cell carcinoma of the left nostril "   Psychiatric: Judgment normal.     I have reexamined the patient and the results are consistent with the previously documented exam. Gilbert Perez MD     RECENT LABS:  Hematology WBC   Date Value Ref Range Status   11/10/2021 5.55 3.40 - 10.80 10*3/mm3 Final   09/13/2021 5.26 3.40 - 10.80 10*3/mm3 Final     RBC   Date Value Ref Range Status   11/10/2021 4.36 3.77 - 5.28 10*6/mm3 Final   09/13/2021 4.39 3.77 - 5.28 10*6/mm3 Final     Hemoglobin   Date Value Ref Range Status   11/10/2021 13.4 12.0 - 15.9 g/dL Final     Hematocrit   Date Value Ref Range Status   11/10/2021 39.0 34.0 - 46.6 % Final     Platelets   Date Value Ref Range Status   11/10/2021 230 140 - 450 10*3/mm3 Final       Mammogram 9/1/2021   OK           DEXA 2/19/2020     Osteopenia    Assessment/Plan   1.  DCIS with microinvasion of the right breast status post mastectomy and TRAM flap reconstruction.  She also received therapy with Evista as breast cancer prevention strategy.  After 5 years of Evista she elected to remain on the medication due to her osteopenia.  As noted above, this has since been discontinued and she is now on weekly Fosamax prescribed by her primary care physician.  2.  Melanoma excised from the leg.  She continues regular follow-up with her dermatologist.  3.  Basal cell carcinoma of the left nostril.  She is planning to undergo Mohs microsurgery later this week.  Plan  1.  Return in 12 months for routine follow-up.  2.  Her next mammogram  will be due in September 2022.  3.  She will continue weekly Fosamax and will have her bone density scans scheduled through her primary care office.                    11/10/2021      CC:

## 2022-01-05 DIAGNOSIS — M85.89 OSTEOPENIA OF MULTIPLE SITES: ICD-10-CM

## 2022-01-05 RX ORDER — ALENDRONATE SODIUM 70 MG/1
70 TABLET ORAL
Qty: 12 TABLET | Refills: 3 | Status: SHIPPED | OUTPATIENT
Start: 2022-01-05 | End: 2022-10-18 | Stop reason: SDUPTHER

## 2022-02-15 ENCOUNTER — OFFICE VISIT (OUTPATIENT)
Dept: SURGERY | Facility: CLINIC | Age: 67
End: 2022-02-15

## 2022-02-15 VITALS
BODY MASS INDEX: 25.44 KG/M2 | WEIGHT: 149 LBS | DIASTOLIC BLOOD PRESSURE: 78 MMHG | SYSTOLIC BLOOD PRESSURE: 113 MMHG | HEIGHT: 64 IN

## 2022-02-15 DIAGNOSIS — Z85.3 HISTORY OF BREAST CANCER: Primary | ICD-10-CM

## 2022-02-15 DIAGNOSIS — Z80.3 FH: BREAST CANCER: ICD-10-CM

## 2022-02-15 DIAGNOSIS — R10.10 PAIN OF UPPER ABDOMEN: ICD-10-CM

## 2022-02-15 PROCEDURE — 99214 OFFICE O/P EST MOD 30 MIN: CPT | Performed by: NURSE PRACTITIONER

## 2022-02-15 NOTE — PROGRESS NOTES
[facility]     Referring Provider: Oni Rodriguez MD     Chief complaint: breast mass     HPI: Ms. Chris Moreno is a 67 yo woman, seen at the request of Oni Rodriguez MD, for right TRAM flap mass.    I personally reviewed her records and summarized her relevant breast history/imaging:    She has a personal history of right breast cancer, T1N0M0, multifocal DCIS s/p mastectomy/SLNB with TRAM flap reconstruction in , evista x 8-9 years, left breast cyst aspiration in .        She has a family history of breast cancer in her mother at age 55,  from melanoma,  sister age 37,  in her 40's from breast cancer, another sister at age 47 at time of diagnosis.  She denies any family history of ovarian cancer. She has a personal history of melonoma excision in , left calf.  BRCA genetic testing was completed in  with no mutation detected in BRCA1 or BRCA2.    2021: Clinic visit with DANNI Kenyon  With CBE, firmer tissue to right lateral side of breast at 0600.  Patient had right breast reconstruction post right mastectomy-- patient reporting mild tenderness to area when laying down.  Mammogram completed 2021.    Ambulatory Referral to Breast Surgery    2020: Screening mammogram with tomosynthesis left breast at [facility]  Breast is heterogeneously dense.  There are no suspicious masses, significant calcifications, or other abnormality seen.  Tomosynthesis was utilized for this examination.  Impression:  There is no mammographic evidence of malignancy.  Screening mammogram 1 year is recommended.  BI-RADS Category 1    2021: Screening mammogram with tomosynthesis left breast at [facility]  Breast is heterogeneously dense.  There is no suspicious masses, significant calcifications, or other abnormality seen.  Tomosynthesis was utilized for this examination.  Impression:  There is no mammographic evidence of malignancy.  Screening mammogram 1 year is  recommended.  BI-RADS Category 1    Today she presents with right lower ribcage discomfort for about 9 months.  She feels a soft protrusion over her rib and has a tender area in her right side when lying on her side.  She denies any breast lumps, pain, skin changes, or nipple discharge.  She continues in follow up annually with Dr Perez in medical oncology.  She was by herself in clinic today.        Review of Systems   All other systems reviewed and are negative.      Medications:    Current Outpatient Medications:   •  alendronate (FOSAMAX) 70 MG tablet, TAKE 1 TABLET BY MOUTH EVERY 7 (SEVEN) DAYS. TAKE ON EMPTY STOMACH WITH FULL GLASS OF WATER. FOR THE NEXT 30 MIN DO NOT BEND OVER, EAT OR LAY DOWN., Disp: 12 tablet, Rfl: 3  •  Calcium Citrate (CITRACAL PO), Take  by mouth Daily., Disp: , Rfl:   •  cetirizine (ZYRTEC ALLERGY) 10 MG tablet, , Disp: , Rfl:   •  Cholecalciferol (VITAMIN D) 2000 UNITS capsule, Take 2,000 Units by mouth daily., Disp: , Rfl:   •  ezetimibe (ZETIA) 10 MG tablet, TAKE 1 TABLET BY MOUTH EVERY DAY, Disp: 90 tablet, Rfl: 2  •  fluticasone (FLONASE) 50 MCG/ACT nasal spray, 2 sprays into the nostril(s) as directed by provider Daily., Disp: 16 g, Rfl: 11  •  ketoconazole (NIZORAL) 2 % cream, Apply  topically to the appropriate area as directed Daily., Disp: , Rfl:   •  olopatadine (PATADAY) 0.2 % solution ophthalmic solution, ADMINISTER 1 DROP TO BOTH EYES DAILY., Disp: 2.5 mL, Rfl: 11  •  rosuvastatin (CRESTOR) 20 MG tablet, TAKE 1 TABLET BY MOUTH EVERY DAY, Disp: 90 tablet, Rfl: 3  •  tretinoin (RETIN-A) 0.025 % cream, APPLY A SMALL AMOUNT TO AFFECTED AREA EVERY EVENING, Disp: , Rfl:     Allergies:  No Known Allergies    Medical history:  Past Medical History:   Diagnosis Date   • Breast cancer (HCC)     03/2006 Ductal carcinoma in situ T1N0M0, multifocal, R breast, s/p total R mastectomy and axillary lymphatic mapping, .   • Dry eye syndrome    • Fracture, humerus     11/2008 R  lateral humeral epicondilar Fx, s/p Sx    • HLD (hyperlipidemia)    • Hypercholesterolemia    • Hyperglycemia    • Melanoma in situ (HCC)     , L calf melanoma, s/p removal   • Nulliparity    • Osteopenia    • Subclinical hypothyroidism    • Vitamin D deficiency        Surgical History:  Past Surgical History:   Procedure Laterality Date   • BREAST SURGERY Right     mastectomy with TRAM flap reconstruction   • COLONOSCOPY         • COLONOSCOPY N/A 2016    Procedure: COLONOSCOPY to cecum;  Surgeon: Dale Ann Jr., MD;  Location: [facility] ENDOSCOPY;  Service:    • FRACTURE SURGERY     • HUMERUS FRACTURE SURGERY      2008,  for the L lateral humeral epicondilar fracture   • MALIGNANT SKIN LESION EXCISION Left     calf melanoma   • MASTECTOMY       R ductal carcinoma in situ, multifocal,  R total mastectomy   • TONSILLECTOMY     • VEIN LIGATION         Family History:  Family History   Problem Relation Age of Onset   • Diabetes Mother    • Breast cancer Mother    • Melanoma Mother    • Lung cancer Father    • Hypertension Sister    • Breast cancer Sister    • Lung cancer Maternal Grandmother    • Diabetes Maternal Grandmother    • Glaucoma Paternal Grandmother    • Diabetes Paternal Grandmother    • Lung cancer Maternal Grandfather    • Breast cancer Sister    • Melanoma Maternal Uncle        Social History:   Social History     Socioeconomic History   • Marital status:      Spouse name: Stephanie   Tobacco Use   • Smoking status: Never Smoker   • Smokeless tobacco: Never Used   Vaping Use   • Vaping Use: Never used   Substance and Sexual Activity   • Alcohol use: Yes     Comment: social   • Drug use: No   • Sexual activity: Yes     Partners: Male     Patient drinks 1 servings of caffeine per day.       GYNECOLOGIC HISTORY:   . P: 0. AB: 0.  Last menstrual period: age 49  Age at menarche: 11  Age at first childbirth: n/a  Lactation/How long: n/a  Age at menopause:  "49  Total years of oral contraceptive use: 7  Total years of hormone replacement therapy: n/a      Physical Exam  Vitals:    02/15/22 0825   BP: 113/78   /78 (BP Location: Left arm)   Ht 161.9 cm (63.74\")   Wt 67.6 kg (149 lb)   BMI 25.78 kg/m²     ECOG 0 - Asymptomatic  General: NAD, well appearing  Psych: a&o x 3, normal mood and affect  Eyes: EOMI, no scleral icterus  ENMT: neck supple without masses or thyromegaly, mucus membranes moist  Resp: normal effort, CTAB  CV: RRR, no murmurs, no edema   GI: soft, NT, ND  MSK: normal gait, normal ROM in bilateral shoulders  Lymph nodes:  no cervical, supraclavicular or axillary lymphadenopathy  Breast: slightly asymmetric, right reconstructed breast > left.  Right: TRAM flap reconstruction, No visible abnormalities on inspection while seated, with arms raised or hands on hips. No masses, skin changes, or nipple abnormalities.  Left:  No visible abnormalities on inspection while seated, with arms raised or hands on hips. No masses, skin changes, or nipple abnormalities.  CHEST:  In the lower ribcage, inferior to the right reconstructed breast, mild prominence of the rib is noted at area of concern expressed by patient.  Area of tenderness is noted lateral to this region when lying on her right side.  No palpable or visible changes.      Assessment:    1)  66 y.o. F with pain/mass inferior to right reconstructed breast, upper right abdomen/chest    2)  Personal history of right breast cancer, 2006, s/p total mastectomy/SLNB, TRAM flap reconstruction    3)  Family history of breast cancer, negative BRCA testing in 2006    Discussion:  1)  We discussed her area of concern in the right ribcage, inferior to her right reconstructed breast inframammary fold.  This is inferior to the actual reconstructed breast.  I advised her to return to see Dr Dillard who did her original TRAM reconstruction to discuss possible relation to that procedure.  Also I will defer other " abdominal studies to her medical oncologist Dr Perez.      Breast exam is negative for abnormalities.      We also discussed her genetic testing, BRCA testing in 2006 was negative.  Given her extensive family history and personal history of breast and melanoma, expanded panel testing was advised.  She is interested in completion of expanded panel testing which will be sent today.  I will call her with those results.    Plan:  - follow up with Dr Dillard for possible TRAM flap correlation to her symptoms  - follow up with Dr Perez  - I will call her with genetic test results    I will not give her a follow-up in our office.  She is followed by medical oncology and will be due her left screening mammogram in 9/22.    I have asked her to check her self-exam and to call us in the interim with any concerns and I would be happy to see her back.    DANNI Aguayo    I spent 50 minutes caring for Ms Moreno on this date of service. This time includes time spent by me in the following activities: preparing for the visit, reviewing tests, obtaining and/or reviewing a separately obtained history, performing a medically appropriate examination and/or evaluation , counseling and educating the patient/family/caregiver, ordering medications, tests, or procedures and documenting information in the medical record.      CC:  MD Tevin Hall, MD Nishant Cain R. Thomas, MD Webb, MD Gilbert    [employer]/transcription disclaimer:  Dictated using Dragon dictation

## 2022-02-23 ENCOUNTER — TELEPHONE (OUTPATIENT)
Dept: SURGERY | Facility: CLINIC | Age: 67
End: 2022-02-23

## 2022-02-23 NOTE — TELEPHONE ENCOUNTER
[facility] genetic test results reported to patient, negative for mutation.  She will follow up as needed.

## 2022-03-24 ENCOUNTER — TELEPHONE (OUTPATIENT)
Dept: OBSTETRICS AND GYNECOLOGY | Facility: CLINIC | Age: 67
End: 2022-03-24

## 2022-03-30 ENCOUNTER — TELEPHONE (OUTPATIENT)
Dept: INTERNAL MEDICINE | Facility: CLINIC | Age: 67
End: 2022-03-30

## 2022-03-30 NOTE — TELEPHONE ENCOUNTER
Patient came in the office today for an appointment that is not scheduled until tomorrow. She said she spoke with two different people at our [facility] that verified her appointment was today. Patient was unable to keep the appointment for tomorrow, so she is rescheduled for 4/4/22.

## 2022-03-31 DIAGNOSIS — Z12.31 ENCOUNTER FOR SCREENING MAMMOGRAM FOR MALIGNANT NEOPLASM OF BREAST: Primary | ICD-10-CM

## 2022-04-04 ENCOUNTER — OFFICE VISIT (OUTPATIENT)
Dept: INTERNAL MEDICINE | Facility: CLINIC | Age: 67
End: 2022-04-04

## 2022-04-04 VITALS
HEART RATE: 76 BPM | OXYGEN SATURATION: 97 % | SYSTOLIC BLOOD PRESSURE: 122 MMHG | TEMPERATURE: 97.3 F | WEIGHT: 150.2 LBS | HEIGHT: 63 IN | BODY MASS INDEX: 26.61 KG/M2 | DIASTOLIC BLOOD PRESSURE: 78 MMHG

## 2022-04-04 DIAGNOSIS — M85.89 OSTEOPENIA OF MULTIPLE SITES: ICD-10-CM

## 2022-04-04 DIAGNOSIS — Z78.0 POSTMENOPAUSAL: ICD-10-CM

## 2022-04-04 DIAGNOSIS — E78.00 PURE HYPERCHOLESTEROLEMIA: Primary | ICD-10-CM

## 2022-04-04 PROCEDURE — 99214 OFFICE O/P EST MOD 30 MIN: CPT | Performed by: FAMILY MEDICINE

## 2022-04-04 NOTE — PROGRESS NOTES
"Chief Complaint  osteopenia and Hyperlipidemia    Subjective          Chris Moreno presents to [facility]  History of Present Illness     Patient taking alendronate for osteopenia for spine and hips.  Due for next BMD.  She was previously on Evista.  Been on Fosamax for about 2 years.  She participates in water aerobics for exercise.  Recommended she also do some cross training with walking to help get some strengthening for her bones with weightbearing exercise.     HLD-stable but increasing  and HDL 45.  Patient taking rosuvastatin 20 mg, Zetia 10 mg as prescribed. Trying to adhere to a healthy diet.  She is missing doses about 2 times weekly.  We discussed some ways to help her remember.       Objective   Vital Signs:   /78 (BP Location: Left arm, Patient Position: Sitting, Cuff Size: Adult)   Pulse 76   Temp 97.3 °F (36.3 °C) (Temporal)   Ht 160 cm (63\")   Wt 68.1 kg (150 lb 3.2 oz)   SpO2 97%   BMI 26.61 kg/m²            Physical Exam  Vitals and nursing note reviewed.   Constitutional:       General: She is not in acute distress.     Appearance: Normal appearance.   Cardiovascular:      Rate and Rhythm: Normal rate and regular rhythm.      Heart sounds: Normal heart sounds. No murmur heard.  Pulmonary:      Effort: Pulmonary effort is normal.      Breath sounds: Normal breath sounds.   Neurological:      Mental Status: She is alert.        Result Review :   The following data was reviewed by: Boyd Abarca MD on 04/04/2022:  Common labs    Common Labsle 9/13/21 9/13/21 9/13/21 11/10/21 11/10/21 3/23/22    0956 0956 0956 0952 0952    Glucose  97   103 (A)    BUN  15   20    Creatinine  0.73   0.62    eGFR Non  Am  79   96    eGFR African Am  96       Sodium  140   141    Potassium  4.3   4.2    Chloride  104   107    Calcium  9.4   9.2    Total Protein  6.6       Albumin  4.60   4.60    Total Bilirubin  0.8   0.7    Alkaline Phosphatase  65   64    AST " (SGOT)  20   17    ALT (SGPT)  14   15    WBC 5.26   5.55     Hemoglobin 13.5   13.4     Hematocrit 40.4   39.0     Platelets 240   230     Total Cholesterol   188   204 (A)   Triglycerides   122   120   HDL Cholesterol   44   45   LDL Cholesterol    122 (A)   137 (A)   (A) Abnormal value       Comments are available for some flowsheets but are not being displayed.                     Assessment and Plan    Diagnoses and all orders for this visit:    1. Pure hypercholesterolemia (Primary)    2. Osteopenia of multiple sites  -     DEXA Bone Density Axial; Future    3. Postmenopausal  -     DEXA Bone Density Axial; Future      Stable chronic conditions as above.  Continue all medications as above.  Dexa ordered.  Likely to continue the Fosamax for the 5 years total and then plan to discontinue for holiday period.  We will recheck labs at next visit.          Follow Up   Return in about 6 months (around 10/11/2022) for Medicare Wellness.  Patient was given instructions and counseling regarding her condition or for health maintenance advice. Please see specific information pulled into the AVS if appropriate.

## 2022-04-05 DIAGNOSIS — J30.9 ALLERGIC RHINITIS, UNSPECIFIED SEASONALITY, UNSPECIFIED TRIGGER: ICD-10-CM

## 2022-04-06 RX ORDER — FLUTICASONE PROPIONATE 50 MCG
SPRAY, SUSPENSION (ML) NASAL
Qty: 16 ML | Refills: 11 | Status: SHIPPED | OUTPATIENT
Start: 2022-04-06

## 2022-05-16 ENCOUNTER — TELEPHONE (OUTPATIENT)
Dept: INTERNAL MEDICINE | Facility: CLINIC | Age: 67
End: 2022-05-16

## 2022-05-16 NOTE — TELEPHONE ENCOUNTER
Caller: Chris Moreno    Relationship to patient: Self    Best call back number: [phone]    Date of exposure: POSSIBLE EXPOSURE ON 05/09    Date of positive COVID19 test: PATIENT HAD NEGATIVE TEST ON 05/13 AND POSITIVE TEST ON 05/14    Date if possible COVID19 exposure: PLAYED BRIDGE WITH SOME PEOPLE ON 05/09 AND MANY OF THEM NOW HAVE COVID.     COVID19 symptoms: HEADACHE, CONGESTION, RUNNY NOSE.     Date of initial quarantine: 05/13    Additional information or concerns:PATIENT WAS JUST CALLING TO LET HER DOCTOR KNOW THAT SHE HAS TESTED POSITIVE FOR COVID.  PATIENT IS TAKING FLONASE, TYLENOL AS NEEDED, EYEDROPS      PATIENT STATES THAT SHE IS SCHEDULED TO GO ON A VACATION ON 05/21 AND WANTS TO KNOW IF SHE IS FEELING BETTER IF SHE WILL BE ABLE TO GO.     What is the patients preferred pharmacy: [facility]/pharmacy #[id number] - [place], [place] - [address] - [phone] [facility] [phone]   [phone]

## 2022-05-16 NOTE — TELEPHONE ENCOUNTER
Pt advised to add Mucinex to med regimen  Pt advised what the current recommendation is for quarantining

## 2022-07-14 ENCOUNTER — TELEPHONE (OUTPATIENT)
Dept: OBSTETRICS AND GYNECOLOGY | Facility: CLINIC | Age: 67
End: 2022-07-14

## 2022-07-14 DIAGNOSIS — Z12.31 ENCOUNTER FOR SCREENING MAMMOGRAM FOR MALIGNANT NEOPLASM OF BREAST: Primary | ICD-10-CM

## 2022-07-14 NOTE — TELEPHONE ENCOUNTER
Caller: Chris Moreno    Relationship: Self    Best call back number: [phone]    What orders are you requesting (i.e. lab or imaging): MAMMOGRAM    In what timeframe would the patient need to come in: APPT ALREADY SCHEDULED      Where will you receive your lab/imaging services: SEP 2, 2022    Additional notes: PT STATED ORDER WAS IN AS BILATERAL MAMMO AND SHE HAD A MASTECTOMY ON THE RIGHT SIDE YEARS AGO (2006). THE [facility] [address] TOLD HER IT SHOULD TO BE CHANGED TO LEFT SIDE ONLY OR THEY WILL DO BOTH SIDES. CAN YOU CHANGE IT TO ONLY 1 SIDE? YOU CAN CALL HER AT ANYTIME IF YOU NEED TO AND YOU CAN LEAVE A VM IF NEEDED.

## 2022-07-14 NOTE — TELEPHONE ENCOUNTER
Left message for Chris that Angelique from our Mx dept at the [facility] has faxed the new order over to [facility]. Thank you.

## 2022-07-15 ENCOUNTER — HOSPITAL ENCOUNTER (OUTPATIENT)
Dept: BONE DENSITY | Facility: HOSPITAL | Age: 67
Discharge: HOME | End: 2022-07-15
Admitting: FAMILY MEDICINE

## 2022-07-15 DIAGNOSIS — M85.89 OSTEOPENIA OF MULTIPLE SITES: ICD-10-CM

## 2022-07-15 DIAGNOSIS — Z78.0 POSTMENOPAUSAL: ICD-10-CM

## 2022-07-15 PROCEDURE — 77080 DXA BONE DENSITY AXIAL: CPT

## 2022-07-15 NOTE — TELEPHONE ENCOUNTER
Left message for Chris Merino from our Mx office at [place] has faxed the new order to [facility]. Thank you.

## 2022-07-19 PROBLEM — M81.6 LOCALIZED OSTEOPOROSIS WITHOUT CURRENT PATHOLOGICAL FRACTURE: Chronic | Status: ACTIVE | Noted: 2022-07-19

## 2022-07-29 ENCOUNTER — APPOINTMENT (OUTPATIENT)
Dept: BONE DENSITY | Facility: HOSPITAL | Age: 67
End: 2022-07-29

## 2022-08-04 DIAGNOSIS — E78.00 PURE HYPERCHOLESTEROLEMIA: ICD-10-CM

## 2022-08-04 RX ORDER — EZETIMIBE 10 MG/1
TABLET ORAL
Qty: 90 TABLET | Refills: 2 | Status: SHIPPED | OUTPATIENT
Start: 2022-08-04

## 2022-09-01 ENCOUNTER — OFFICE VISIT (OUTPATIENT)
Dept: INTERNAL MEDICINE | Facility: CLINIC | Age: 67
End: 2022-09-01

## 2022-09-01 VITALS
WEIGHT: 149.4 LBS | OXYGEN SATURATION: 97 % | SYSTOLIC BLOOD PRESSURE: 122 MMHG | TEMPERATURE: 96.8 F | HEIGHT: 63 IN | BODY MASS INDEX: 26.47 KG/M2 | DIASTOLIC BLOOD PRESSURE: 80 MMHG | HEART RATE: 81 BPM

## 2022-09-01 DIAGNOSIS — M81.6 LOCALIZED OSTEOPOROSIS WITHOUT CURRENT PATHOLOGICAL FRACTURE: Primary | Chronic | ICD-10-CM

## 2022-09-01 PROCEDURE — 99213 OFFICE O/P EST LOW 20 MIN: CPT | Performed by: NURSE PRACTITIONER

## 2022-09-01 NOTE — PROGRESS NOTES
"Chief Complaint  Osteoporosis (In right hip wants to do a bone density test. )    Subjective        Chris Moreno presents to [facility]  History of Present Illness  This is a 68 y/o female presenting to office for f/u with dexa scan which indicated osteoporosis in right hip. Patient was on evista but was switched over to alendronate in 2020. Patient reports tolerating the alendronate since starting the medication. Patient had dexa scan in 2019 which indicated osteopenia; however, recent scan shows progression to osteoporosis.   Patient reports she was on evista for a while from previous breast cancer treatment in 2006.   Objective   Vital Signs:  /80   Pulse 81   Temp 96.8 °F (36 °C)   Ht 160 cm (62.99\")   Wt 67.8 kg (149 lb 6.4 oz)   SpO2 97%   BMI 26.47 kg/m²   Estimated body mass index is 26.47 kg/m² as calculated from the following:    Height as of this encounter: 160 cm (62.99\").    Weight as of this encounter: 67.8 kg (149 lb 6.4 oz).          Physical Exam  Constitutional:       Appearance: Normal appearance.   HENT:      Head: Normocephalic and atraumatic.   Eyes:      Pupils: Pupils are equal, round, and reactive to light.   Cardiovascular:      Rate and Rhythm: Normal rate and regular rhythm.      Pulses: Normal pulses.      Heart sounds: Normal heart sounds. No murmur heard.    No friction rub. No gallop.   Pulmonary:      Effort: Pulmonary effort is normal. No respiratory distress.      Breath sounds: Normal breath sounds. No stridor. No wheezing, rhonchi or rales.   Musculoskeletal:      Cervical back: Normal range of motion and neck supple.   Skin:     General: Skin is warm and dry.   Neurological:      Mental Status: She is alert and oriented to person, place, and time. Mental status is at baseline.   Psychiatric:         Mood and Affect: Mood normal.         Thought Content: Thought content normal.         Judgment: Judgment normal.        Result Review " :  The following data was reviewed by: DANNI Kenyon on 09/01/2022:  Common labs    Common Labsle 9/13/21 9/13/21 9/13/21 11/10/21 11/10/21 3/23/22    0956 0956 0956 0952 0952    Glucose  97   103 (A)    BUN  15   20    Creatinine  0.73   0.62    eGFR Non  Am  79   96    eGFR African Am  96       Sodium  140   141    Potassium  4.3   4.2    Chloride  104   107    Calcium  9.4   9.2    Total Protein  6.6       Albumin  4.60   4.60    Total Bilirubin  0.8   0.7    Alkaline Phosphatase  65   64    AST (SGOT)  20   17    ALT (SGPT)  14   15    WBC 5.26   5.55     Hemoglobin 13.5   13.4     Hematocrit 40.4   39.0     Platelets 240   230     Total Cholesterol   188   204 (A)   Triglycerides   122   120   HDL Cholesterol   44   45   LDL Cholesterol    122 (A)   137 (A)   (A) Abnormal value       Comments are available for some flowsheets but are not being displayed.                    Assessment and Plan   Diagnoses and all orders for this visit:    1. Localized osteoporosis without current pathological fracture (Primary)  Assessment & Plan:  Patient would like to think about options for treatment-- we discussed risks versus benefits with reclast or prolia.   Patient would like to continue on alendronate for now.   Recommended continue with prioritizing dietary dairy intake.   Patient continues on vitamin D supplementation daily.              Follow Up   Return for Next scheduled follow up 10/18/22 with dr mckeon.  Patient was given instructions and counseling regarding her condition or for health maintenance advice. Please see specific information pulled into the AVS if appropriate.

## 2022-09-01 NOTE — ASSESSMENT & PLAN NOTE
Patient would like to think about options for treatment-- we discussed risks versus benefits with reclast or prolia.   Patient would like to continue on alendronate for now.   Recommended continue with prioritizing dietary dairy intake.   Patient continues on vitamin D supplementation daily.

## 2022-09-06 DIAGNOSIS — Z12.31 ENCOUNTER FOR SCREENING MAMMOGRAM FOR MALIGNANT NEOPLASM OF BREAST: ICD-10-CM

## 2022-09-28 DIAGNOSIS — E78.00 PURE HYPERCHOLESTEROLEMIA: ICD-10-CM

## 2022-09-28 RX ORDER — ROSUVASTATIN CALCIUM 20 MG/1
TABLET, COATED ORAL
Qty: 90 TABLET | Refills: 3 | Status: SHIPPED | OUTPATIENT
Start: 2022-09-28

## 2022-10-10 DIAGNOSIS — E55.9 VITAMIN D DEFICIENCY: ICD-10-CM

## 2022-10-10 DIAGNOSIS — E78.00 PURE HYPERCHOLESTEROLEMIA: Primary | ICD-10-CM

## 2022-10-10 DIAGNOSIS — M81.6 LOCALIZED OSTEOPOROSIS WITHOUT CURRENT PATHOLOGICAL FRACTURE: ICD-10-CM

## 2022-10-11 LAB
25(OH)D3+25(OH)D2 SERPL-MCNC: 44.4 NG/ML (ref 30–100)
ALBUMIN SERPL-MCNC: 4.5 G/DL (ref 3.5–5.2)
ALBUMIN/GLOB SERPL: 2 G/DL
ALP SERPL-CCNC: 62 U/L (ref 39–117)
ALT SERPL-CCNC: 15 U/L (ref 1–33)
AST SERPL-CCNC: 20 U/L (ref 1–32)
BILIRUB SERPL-MCNC: 0.8 MG/DL (ref 0–1.2)
BUN SERPL-MCNC: 16 MG/DL (ref 8–23)
BUN/CREAT SERPL: 21.3 (ref 7–25)
CALCIUM SERPL-MCNC: 9.2 MG/DL (ref 8.6–10.5)
CHLORIDE SERPL-SCNC: 105 MMOL/L (ref 98–107)
CHOLEST SERPL-MCNC: 188 MG/DL (ref 0–200)
CO2 SERPL-SCNC: 25 MMOL/L (ref 22–29)
CREAT SERPL-MCNC: 0.75 MG/DL (ref 0.57–1)
EGFRCR SERPLBLD CKD-EPI 2021: 86.3 ML/MIN/1.73
ERYTHROCYTE [DISTWIDTH] IN BLOOD BY AUTOMATED COUNT: 12.8 % (ref 12.3–15.4)
GLOBULIN SER CALC-MCNC: 2.2 GM/DL
GLUCOSE SERPL-MCNC: 96 MG/DL (ref 65–99)
HCT VFR BLD AUTO: 38.7 % (ref 34–46.6)
HDLC SERPL-MCNC: 44 MG/DL (ref 40–60)
HGB BLD-MCNC: 13 G/DL (ref 12–15.9)
LDLC SERPL CALC-MCNC: 120 MG/DL (ref 0–100)
LDLC/HDLC SERPL: 2.67 {RATIO}
MCH RBC QN AUTO: 30.4 PG (ref 26.6–33)
MCHC RBC AUTO-ENTMCNC: 33.6 G/DL (ref 31.5–35.7)
MCV RBC AUTO: 90.6 FL (ref 79–97)
PHOSPHATE SERPL-MCNC: 3.9 MG/DL (ref 2.5–4.5)
PLATELET # BLD AUTO: 261 10*3/MM3 (ref 140–450)
POTASSIUM SERPL-SCNC: 4.3 MMOL/L (ref 3.5–5.2)
PROT SERPL-MCNC: 6.7 G/DL (ref 6–8.5)
RBC # BLD AUTO: 4.27 10*6/MM3 (ref 3.77–5.28)
SODIUM SERPL-SCNC: 139 MMOL/L (ref 136–145)
TRIGL SERPL-MCNC: 132 MG/DL (ref 0–150)
VLDLC SERPL CALC-MCNC: 24 MG/DL (ref 5–40)
WBC # BLD AUTO: 5.69 10*3/MM3 (ref 3.4–10.8)

## 2022-10-18 ENCOUNTER — OFFICE VISIT (OUTPATIENT)
Dept: INTERNAL MEDICINE | Facility: CLINIC | Age: 67
End: 2022-10-18

## 2022-10-18 VITALS
RESPIRATION RATE: 19 BRPM | DIASTOLIC BLOOD PRESSURE: 80 MMHG | HEIGHT: 62 IN | OXYGEN SATURATION: 98 % | BODY MASS INDEX: 27.6 KG/M2 | HEART RATE: 76 BPM | SYSTOLIC BLOOD PRESSURE: 115 MMHG | TEMPERATURE: 97.7 F | WEIGHT: 150 LBS

## 2022-10-18 DIAGNOSIS — M85.89 OSTEOPENIA OF MULTIPLE SITES: ICD-10-CM

## 2022-10-18 DIAGNOSIS — Z13.89 SCREENING FOR BLOOD OR PROTEIN IN URINE: ICD-10-CM

## 2022-10-18 DIAGNOSIS — Z00.00 MEDICARE ANNUAL WELLNESS VISIT, SUBSEQUENT: Primary | ICD-10-CM

## 2022-10-18 DIAGNOSIS — E78.00 PURE HYPERCHOLESTEROLEMIA: ICD-10-CM

## 2022-10-18 DIAGNOSIS — M81.6 LOCALIZED OSTEOPOROSIS WITHOUT CURRENT PATHOLOGICAL FRACTURE: ICD-10-CM

## 2022-10-18 PROCEDURE — 1159F MED LIST DOCD IN RCRD: CPT | Performed by: FAMILY MEDICINE

## 2022-10-18 PROCEDURE — 1170F FXNL STATUS ASSESSED: CPT | Performed by: FAMILY MEDICINE

## 2022-10-18 PROCEDURE — 1126F AMNT PAIN NOTED NONE PRSNT: CPT | Performed by: FAMILY MEDICINE

## 2022-10-18 PROCEDURE — G0439 PPPS, SUBSEQ VISIT: HCPCS | Performed by: FAMILY MEDICINE

## 2022-10-18 PROCEDURE — 99214 OFFICE O/P EST MOD 30 MIN: CPT | Performed by: FAMILY MEDICINE

## 2022-10-18 RX ORDER — ALENDRONATE SODIUM 70 MG/1
70 TABLET ORAL
Qty: 12 TABLET | Refills: 4 | Status: SHIPPED | OUTPATIENT
Start: 2022-10-18

## 2022-10-18 NOTE — PROGRESS NOTES
"Chief Complaint  Medicare Wellness-subsequent and Follow-up (Bone density and labs )    Subjective        Chris Moreno presents to [facility]  History of Present Illness     We are also discussing the worsening of her bone densities on left and right hip and lumbar spine.  She had multiple sites of osteopenia.  She is now showing osteoporosis of the right hip in the most recent DEXA 7/15/2022.  She has been on alendronate 70mg weekly x 2 years.  Her previous doctors had her on Evista as she had cancer and it was to help promote bone health as well.  However, likely due to advancing age, Dr. Rangel had taken her off and switched to Fosamax.  T score of the right hip -2.5 showing in mild end of osteoporosis.    Objective   Vital Signs:  /80 (BP Location: Left arm, Patient Position: Sitting, Cuff Size: Small Adult)   Pulse 76   Temp 97.7 °F (36.5 °C)   Resp 19   Ht 157.5 cm (62\")   Wt 68 kg (150 lb)   SpO2 98%   BMI 27.44 kg/m²   Estimated body mass index is 27.44 kg/m² as calculated from the following:    Height as of this encounter: 157.5 cm (62\").    Weight as of this encounter: 68 kg (150 lb).          Physical Exam  Vitals and nursing note reviewed.   Constitutional:       General: She is not in acute distress.     Appearance: Normal appearance.   Cardiovascular:      Rate and Rhythm: Normal rate and regular rhythm.      Heart sounds: Normal heart sounds. No murmur heard.  Pulmonary:      Effort: Pulmonary effort is normal.      Breath sounds: Normal breath sounds.   Neurological:      Mental Status: She is alert.        Result Review :  The following data was reviewed by: Boyd Abarca MD on 10/18/2022:  Common labs    Common Labs 11/10/21 11/10/21 3/23/22 10/11/22 10/11/22 10/11/22    0952 0952  0904 0904 0904   Glucose  103 (A)  96     BUN  20  16     Creatinine  0.62  0.75     eGFR Non African Am  96       Sodium  141  139     Potassium  4.2  4.3     Chloride  107 "  105     Calcium  9.2  9.2     Total Protein    6.7     Albumin  4.60  4.50     Total Bilirubin  0.7  0.8     Alkaline Phosphatase  64  62     AST (SGOT)  17  20     ALT (SGPT)  15  15     WBC 5.55    5.69    Hemoglobin 13.4    13.0    Hematocrit 39.0    38.7    Platelets 230    261    Total Cholesterol   204 (A)   188   Triglycerides   120   132   HDL Cholesterol   45   44   LDL Cholesterol    137 (A)   120 (A)   (A) Abnormal value       Comments are available for some flowsheets but are not being displayed.                     Assessment and Plan   Diagnoses and all orders for this visit:    1. Medicare annual wellness visit, subsequent (Primary)    2. Localized osteoporosis without current pathological fracture  -     Comprehensive Metabolic Panel; Future  -     Phosphorus; Future    3. Osteopenia of multiple sites  -     alendronate (FOSAMAX) 70 MG tablet; Take 1 tablet by mouth Every 7 (Seven) Days. Take on empty stomach with full glass of water. For the next 30 min  Do not bend over, eat or lay down.  Dispense: 12 tablet; Refill: 4    4. Pure hypercholesterolemia  -     Comprehensive Metabolic Panel; Future  -     CBC (No Diff); Future  -     Lipid Panel With LDL / HDL Ratio; Future    5. Screening for blood or protein in urine  -     Urinalysis With Microscopic If Indicated (No Culture) - Urine, Clean Catch; Future    We discussed some options.  I definitely advised a eating plan for osteoporosis provided in AVS including calcium per day and monitoring vitamin D periodically.  We could switch to Prolia but the patient is anxious about the possibility of jaw necrosis, which can happen although not common.  She is still having dental work done.  Her dentist is aware of the Fosamax.  Another option is to remain on the alendronate, high encourage weight-bearing exercise, and recheck DEXA in 1 year.  If further decrease seen, would highly recommend switching medication.  I would not recommend no bisphosphonate  therapy.    We decided together to give Fosamax 1 more year with more weight bearing exercise and calcium + Vit D.  If no improvement or stabilization in 1 year, recommend swapping therapies.         Follow Up   Return in about 6 months (around 4/18/2023) for Next scheduled follow up.  Patient was given instructions and counseling regarding her condition or for health maintenance advice. Please see specific information pulled into the AVS if appropriate.

## 2022-10-18 NOTE — PROGRESS NOTES
The ABCs of the Annual Wellness Visit  Subsequent Medicare Wellness Visit    Chief Complaint   Patient presents with   • Medicare Wellness-subsequent   • Follow-up     Bone density and labs       Subjective    History of Present Illness:  Chris Moreno is a 67 y.o. female who presents for a Subsequent Medicare Wellness Visit.    The following portions of the patient's history were reviewed and   updated as appropriate: allergies, current medications, past family history, past medical history, past social history, past surgical history and problem list.    Compared to one year ago, the patient feels her physical   health is the same.    Compared to one year ago, the patient feels her mental   health is the same.    Recent Hospitalizations:  She was not admitted to the hospital during the last year.       Current Medical Providers:  Patient Care Team:  Boyd Abarca MD as PCP - General (Family Medicine)  Gilbert Perez MD as Consulting Physician (Hematology and Oncology)  Belkis Rangel MD as Referring Physician (Internal Medicine)    Outpatient Medications Prior to Visit   Medication Sig Dispense Refill   • Calcium Citrate (CITRACAL PO) Take  by mouth Daily.     • cetirizine (zyrTEC) 10 MG tablet      • Cholecalciferol (VITAMIN D) 2000 UNITS capsule Take 2,000 Units by mouth daily.     • ezetimibe (ZETIA) 10 MG tablet TAKE 1 TABLET BY MOUTH EVERY DAY 90 tablet 2   • fluticasone (FLONASE) 50 MCG/ACT nasal spray INSTILL TWO SPRAYS INTO EACH NOSTRIL DAILY AS DIRECTED BY PROVIDER 16 mL 11   • ketoconazole (NIZORAL) 2 % cream Apply  topically to the appropriate area as directed Daily.     • olopatadine (PATADAY) 0.2 % solution ophthalmic solution ADMINISTER 1 DROP TO BOTH EYES DAILY. 2.5 mL 11   • rosuvastatin (CRESTOR) 20 MG tablet TAKE 1 TABLET BY MOUTH EVERY DAY 90 tablet 3   • tretinoin (RETIN-A) 0.025 % cream APPLY A SMALL AMOUNT TO AFFECTED AREA EVERY EVENING     • alendronate (FOSAMAX) 70 MG tablet TAKE 1  "TABLET BY MOUTH EVERY 7 (SEVEN) DAYS. TAKE ON EMPTY STOMACH WITH FULL GLASS OF WATER. FOR THE NEXT 30 MIN DO NOT BEND OVER, EAT OR LAY DOWN. 12 tablet 3     No facility-administered medications prior to visit.       No opioid medication identified on active medication list. I have reviewed chart for other potential  high risk medication/s and harmful drug interactions in the elderly.          Aspirin is not on active medication list.  Aspirin use is not indicated based on review of current medical condition/s. Risk of harm outweighs potential benefits.  .    Patient Active Problem List   Diagnosis   • Atopic rhinitis   • Pure hypercholesterolemia   • Keratoconjunctivitis sicca (HCC)   • Osteopenia of left hip   • Varicose veins   • Vitamin D deficiency   • Health care maintenance   • History of breast cancer   • History of melanoma   • Impaired fasting blood sugar   • Medicare annual wellness visit, subsequent   • Seasonal allergies   • FH: breast cancer   • Pain of upper abdomen   • Localized osteoporosis without current pathological fracture     Advance Care Planning  Advance Directive is not on file.  ACP discussion was held with the patient during this visit. Patient has an advance directive (not in EMR), copy requested.          Objective    Vitals:    10/18/22 1302   BP: 115/80   BP Location: Left arm   Patient Position: Sitting   Cuff Size: Small Adult   Pulse: 76   Resp: 19   Temp: 97.7 °F (36.5 °C)   SpO2: 98%   Weight: 68 kg (150 lb)   Height: 157.5 cm (62\")   PainSc: 0-No pain     Estimated body mass index is 27.44 kg/m² as calculated from the following:    Height as of this encounter: 157.5 cm (62\").    Weight as of this encounter: 68 kg (150 lb).    BMI is >= 25 and <30. (Overweight) The following options were offered after discussion;: exercise counseling/recommendations      Does the patient have evidence of cognitive impairment? No    Physical Exam  Vitals and nursing note reviewed.   Constitutional: "       General: She is not in acute distress.     Appearance: Normal appearance.   Cardiovascular:      Rate and Rhythm: Normal rate and regular rhythm.      Heart sounds: Normal heart sounds. No murmur heard.  Pulmonary:      Effort: Pulmonary effort is normal.      Breath sounds: Normal breath sounds.   Neurological:      Mental Status: She is alert.       Lab Results   Component Value Date    CHLPL 188 10/11/2022    TRIG 132 10/11/2022    HDL 44 10/11/2022     (H) 10/11/2022    VLDL 24 10/11/2022            HEALTH RISK ASSESSMENT    Smoking Status:  Social History     Tobacco Use   Smoking Status Never   Smokeless Tobacco Never     Alcohol Consumption:  Social History     Substance and Sexual Activity   Alcohol Use Yes    Comment: social     Fall Risk Screen:    [facility] Fall Risk Assessment was completed, and patient is at LOW risk for falls.Assessment completed on:10/18/2022    Depression Screening:  PHQ-2/PHQ-9 Depression Screening 10/18/2022   Retired PHQ-9 Total Score -   Retired Total Score -   Little Interest or Pleasure in Doing Things 0-->not at all   Feeling Down, Depressed or Hopeless 0-->not at all   PHQ-9: Brief Depression Severity Measure Score 0       Health Habits and Functional and Cognitive Screening:  Functional & Cognitive Status 10/18/2022   Do you have difficulty preparing food and eating? No   Do you have difficulty bathing yourself, getting dressed or grooming yourself? No   Do you have difficulty using the toilet? No   Do you have difficulty moving around from place to place? No   Do you have trouble with steps or getting out of a bed or a chair? No   Current Diet Well Balanced Diet   Dental Exam Up to date   Eye Exam Not up to date   Exercise (times per week) 2 times per week   Current Exercises Include Aerobics;Walking        Exercise Comment -   Current Exercise Activities Include -   Do you need help using the phone?  No   Are you deaf or do you have serious difficulty hearing?   No   Do you need help with transportation? No   Do you need help shopping? No   Do you need help preparing meals?  No   Do you need help with housework?  No   Do you need help with laundry? No   Do you need help taking your medications? No   Do you need help managing money? No   Do you ever drive or ride in a car without wearing a seat belt? No   Have you felt unusual stress, anger or loneliness in the last month? -   Who do you live with? -   If you need help, do you have trouble finding someone available to you? -   Have you been bothered in the last four weeks by sexual problems? -   Do you have difficulty concentrating, remembering or making decisions? -       Age-appropriate Screening Schedule:  Refer to the list below for future screening recommendations based on patient's age, sex and/or medical conditions. Orders for these recommended tests are listed in the plan section. The patient has been provided with a written plan.    Health Maintenance   Topic Date Due   • MAMMOGRAM  09/02/2023   • LIPID PANEL  10/11/2023   • DXA SCAN  07/15/2024   • TDAP/TD VACCINES (3 - Td or Tdap) 02/01/2032   • ZOSTER VACCINE  Addressed   • INFLUENZA VACCINE  Discontinued   • PAP SMEAR  Discontinued              Common labs    Common Labs 11/10/21 11/10/21 3/23/22 10/11/22 10/11/22 10/11/22    0952 0952  0904 0904 0904   Glucose  103 (A)  96     BUN  20  16     Creatinine  0.62  0.75     eGFR Non African Am  96       Sodium  141  139     Potassium  4.2  4.3     Chloride  107  105     Calcium  9.2  9.2     Total Protein    6.7     Albumin  4.60  4.50     Total Bilirubin  0.7  0.8     Alkaline Phosphatase  64  62     AST (SGOT)  17  20     ALT (SGPT)  15  15     WBC 5.55    5.69    Hemoglobin 13.4    13.0    Hematocrit 39.0    38.7    Platelets 230    261    Total Cholesterol   204 (A)   188   Triglycerides   120   132   HDL Cholesterol   45   44   LDL Cholesterol    137 (A)   120 (A)   (A) Abnormal value       Comments are  available for some flowsheets but are not being displayed.               Assessment & Plan   CMS Preventative Services Quick Reference  Risk Factors Identified During Encounter  Immunizations Discussed/Encouraged (specific Immunizations; Influenza, Prevnar 20 (Pneumococcal 20-valent conjugate) and COVID19  The above risks/problems have been discussed with the patient.  Follow up actions/plans if indicated are seen below in the Assessment/Plan Section.  Pertinent information has been shared with the patient in the After Visit Summary.    Diagnoses and all orders for this visit:    1. Medicare annual wellness visit, subsequent (Primary)    2. Localized osteoporosis without current pathological fracture  -     Comprehensive Metabolic Panel; Future  -     Phosphorus; Future    3. Osteopenia of multiple sites  -     alendronate (FOSAMAX) 70 MG tablet; Take 1 tablet by mouth Every 7 (Seven) Days. Take on empty stomach with full glass of water. For the next 30 min  Do not bend over, eat or lay down.  Dispense: 12 tablet; Refill: 4    4. Pure hypercholesterolemia  -     Comprehensive Metabolic Panel; Future  -     CBC (No Diff); Future  -     Lipid Panel With LDL / HDL Ratio; Future    5. Screening for blood or protein in urine  -     Urinalysis With Microscopic If Indicated (No Culture) - Urine, Clean Catch; Future          Follow Up:   Return in about 6 months (around 4/18/2023) for Next scheduled follow up.     An After Visit Summary and PPPS were made available to the patient.

## 2022-11-09 ENCOUNTER — LAB (OUTPATIENT)
Dept: LAB | Facility: HOSPITAL | Age: 67
End: 2022-11-09

## 2022-11-09 ENCOUNTER — OFFICE VISIT (OUTPATIENT)
Dept: ONCOLOGY | Facility: CLINIC | Age: 67
End: 2022-11-09

## 2022-11-09 VITALS
OXYGEN SATURATION: 97 % | HEART RATE: 66 BPM | HEIGHT: 62 IN | TEMPERATURE: 97.9 F | RESPIRATION RATE: 18 BRPM | WEIGHT: 147.6 LBS | SYSTOLIC BLOOD PRESSURE: 119 MMHG | BODY MASS INDEX: 27.16 KG/M2 | DIASTOLIC BLOOD PRESSURE: 80 MMHG

## 2022-11-09 DIAGNOSIS — Z85.820 HISTORY OF MELANOMA: ICD-10-CM

## 2022-11-09 DIAGNOSIS — Z85.3 HISTORY OF BREAST CANCER: ICD-10-CM

## 2022-11-09 DIAGNOSIS — M85.80 OSTEOPENIA, UNSPECIFIED LOCATION: ICD-10-CM

## 2022-11-09 DIAGNOSIS — Z85.3 HISTORY OF BREAST CANCER: Primary | ICD-10-CM

## 2022-11-09 LAB
ALBUMIN SERPL-MCNC: 4.5 G/DL (ref 3.5–5.2)
ALBUMIN/GLOB SERPL: 1.6 G/DL (ref 1.1–2.4)
ALP SERPL-CCNC: 60 U/L (ref 38–116)
ALT SERPL W P-5'-P-CCNC: 14 U/L (ref 0–33)
ANION GAP SERPL CALCULATED.3IONS-SCNC: 11.5 MMOL/L (ref 5–15)
AST SERPL-CCNC: 18 U/L (ref 0–32)
BASOPHILS # BLD AUTO: 0.03 10*3/MM3 (ref 0–0.2)
BASOPHILS NFR BLD AUTO: 0.6 % (ref 0–1.5)
BILIRUB SERPL-MCNC: 0.7 MG/DL (ref 0.2–1.2)
BUN SERPL-MCNC: 15 MG/DL (ref 6–20)
BUN/CREAT SERPL: 17 (ref 7.3–30)
CALCIUM SPEC-SCNC: 9.5 MG/DL (ref 8.5–10.2)
CHLORIDE SERPL-SCNC: 107 MMOL/L (ref 98–107)
CO2 SERPL-SCNC: 23.5 MMOL/L (ref 22–29)
CREAT SERPL-MCNC: 0.88 MG/DL (ref 0.6–1.1)
DEPRECATED RDW RBC AUTO: 42.2 FL (ref 37–54)
EGFRCR SERPLBLD CKD-EPI 2021: 71.2 ML/MIN/1.73
EOSINOPHIL # BLD AUTO: 0.15 10*3/MM3 (ref 0–0.4)
EOSINOPHIL NFR BLD AUTO: 2.9 % (ref 0.3–6.2)
ERYTHROCYTE [DISTWIDTH] IN BLOOD BY AUTOMATED COUNT: 12.7 % (ref 12.3–15.4)
GLOBULIN UR ELPH-MCNC: 2.8 GM/DL (ref 1.8–3.5)
GLUCOSE SERPL-MCNC: 115 MG/DL (ref 74–124)
HCT VFR BLD AUTO: 38.7 % (ref 34–46.6)
HGB BLD-MCNC: 12.8 G/DL (ref 12–15.9)
IMM GRANULOCYTES # BLD AUTO: 0.01 10*3/MM3 (ref 0–0.05)
IMM GRANULOCYTES NFR BLD AUTO: 0.2 % (ref 0–0.5)
LYMPHOCYTES # BLD AUTO: 2.2 10*3/MM3 (ref 0.7–3.1)
LYMPHOCYTES NFR BLD AUTO: 42.1 % (ref 19.6–45.3)
MCH RBC QN AUTO: 30.3 PG (ref 26.6–33)
MCHC RBC AUTO-ENTMCNC: 33.1 G/DL (ref 31.5–35.7)
MCV RBC AUTO: 91.7 FL (ref 79–97)
MONOCYTES # BLD AUTO: 0.37 10*3/MM3 (ref 0.1–0.9)
MONOCYTES NFR BLD AUTO: 7.1 % (ref 5–12)
NEUTROPHILS NFR BLD AUTO: 2.46 10*3/MM3 (ref 1.7–7)
NEUTROPHILS NFR BLD AUTO: 47.1 % (ref 42.7–76)
NRBC BLD AUTO-RTO: 0 /100 WBC (ref 0–0.2)
PLATELET # BLD AUTO: 237 10*3/MM3 (ref 140–450)
PMV BLD AUTO: 10.8 FL (ref 6–12)
POTASSIUM SERPL-SCNC: 4.1 MMOL/L (ref 3.5–4.7)
PROT SERPL-MCNC: 7.3 G/DL (ref 6.3–8)
RBC # BLD AUTO: 4.22 10*6/MM3 (ref 3.77–5.28)
SODIUM SERPL-SCNC: 142 MMOL/L (ref 134–145)
WBC NRBC COR # BLD: 5.22 10*3/MM3 (ref 3.4–10.8)

## 2022-11-09 PROCEDURE — 85025 COMPLETE CBC W/AUTO DIFF WBC: CPT

## 2022-11-09 PROCEDURE — 99213 OFFICE O/P EST LOW 20 MIN: CPT | Performed by: INTERNAL MEDICINE

## 2022-11-09 PROCEDURE — 80053 COMPREHEN METABOLIC PANEL: CPT

## 2022-11-09 PROCEDURE — 36415 COLL VENOUS BLD VENIPUNCTURE: CPT

## 2022-11-09 NOTE — PROGRESS NOTES
Subjective   REASONS FOR FOLLOWUP:   1. 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   2. Negative genetic testing for BRCA1 and BRCA2 in July 2006.   3. Osteopenia  HISTORY OF PRESENT ILLNESS:   The patient is a 67 y.o. female with the above-noted history of DCIS and microinvasion in the right breast in 2006. She underwent mastectomy and TRAM flap reconstruction. She was started on Evista as a breast cancer prevention and  elected to remain on Evista after completing 5 years of therapy due to the fact that she also has significant osteopenia.  She eventually was taken off Evista by her primary care physician and switched to Fosamax due to further decline in her bone density in 2020.      She is up-to-date on her mammograms with her most recent screening mammogram of the remaining left breast having been performed 9/2/20221.   INTERVAL HISTORY:  Since her visit last year she was referred to the breast care center due to palpable mass in the TRAM flap reconstruction.  As part of that visit in February 2022 she had genetic testing which was negative.    She also had Mohs microsurgery for basal cell carcinoma of the left nostril.  This is healed very nicely.    She remains on Fosamax for her osteopenia.    She just returned from a 2-week trip to [place].    History of Present Illness      Past Medical History, Past Surgical History, Social History, Family History have been reviewed and are without significant changes except as mentioned.    Review of Systems   Constitutional: Negative for activity change, appetite change, fatigue, fever and unexpected weight change.   HENT: Negative for hearing loss, nosebleeds, trouble swallowing and voice change.    Eyes: Negative for visual disturbance.   Respiratory: Negative for cough, shortness of breath and wheezing.    Cardiovascular: Negative for chest pain and palpitations.   Gastrointestinal: Negative  "for abdominal pain, diarrhea, nausea and vomiting.   Genitourinary: Negative for difficulty urinating, frequency, hematuria and urgency.   Musculoskeletal: Negative for back pain and neck pain.   Skin: Negative for rash.   Neurological: Negative for dizziness, seizures, syncope and headaches.   Hematological: Negative for adenopathy. Does not bruise/bleed easily.   Psychiatric/Behavioral: Negative for behavioral problems. The patient is not nervous/anxious.      A comprehensive 14 point review of systems was performed and was negative except as mentioned.    Medications:  The current medication list was reviewed in the EMR    ALLERGIES:  No Known Allergies    Objective      Vitals:    11/09/22 1200   BP: 119/80   Pulse: 66   Resp: 18   Temp: 97.9 °F (36.6 °C)   TempSrc: Temporal   SpO2: 97%   Weight: 67 kg (147 lb 9.6 oz)   Height: 157.3 cm (61.94\")   PainSc: 0-No pain     Current Status 11/9/2022   ECOG score 0       Physical Exam   Constitutional: She is oriented to person, place, and time. She appears well-developed. No distress.   HENT:   Head: Normocephalic.   Eyes: Pupils are equal, round, and reactive to light.   Neck: No JVD present. No thyromegaly present.   Cardiovascular: Normal rate and regular rhythm. Exam reveals no gallop and no friction rub.   No murmur heard.  Pulmonary/Chest: Effort normal and breath sounds normal. She has no wheezes. She has no rales. Left breast exhibits no mass, no nipple discharge and no skin change.   Right TRAM reconstruction.   Abdominal: Soft. Bowel sounds are normal. She exhibits no distension and no mass. There is no abdominal tenderness.   Musculoskeletal: No deformity.   Neurological: She is alert and oriented to person, place, and time. She has normal reflexes. No cranial nerve deficit.   Skin: Skin is dry. No rash noted.   Small basal cell carcinoma of the left nostril   Psychiatric: Judgment normal.     I have reexamined the patient and the results are consistent " with the previously documented exam. Gilbert Perez MD     RECENT LABS:  Hematology WBC   Date Value Ref Range Status   11/09/2022 5.22 3.40 - 10.80 10*3/mm3 Final   10/11/2022 5.69 3.40 - 10.80 10*3/mm3 Final     RBC   Date Value Ref Range Status   11/09/2022 4.22 3.77 - 5.28 10*6/mm3 Final   10/11/2022 4.27 3.77 - 5.28 10*6/mm3 Final     Hemoglobin   Date Value Ref Range Status   11/09/2022 12.8 12.0 - 15.9 g/dL Final     Hematocrit   Date Value Ref Range Status   11/09/2022 38.7 34.0 - 46.6 % Final     Platelets   Date Value Ref Range Status   11/09/2022 237 140 - 450 10*3/mm3 Final       Mammogram 9/2/2022   OK           DEXA 2/19/2020     Osteopenia    Assessment & Plan   1.  DCIS with microinvasion of the right breast status post mastectomy and TRAM flap reconstruction.  She also received therapy with Evista as breast cancer prevention strategy.  After 5 years of Evista she elected to remain on the medication due to her osteopenia.  As noted above, this has since been discontinued and she is now on weekly Fosamax prescribed by her primary care physician.  2.  Melanoma excised from the leg.  She continues regular follow-up with her dermatologist.  3.  Basal cell carcinoma of the left nostril.  She underwent successful Mohs microsurgery around this time last year.     Plan  1.  Return in 12 months for routine follow-up.  2.  Her next mammogram  will be due in September 2023.  3.  She will continue weekly Fosamax and will have her bone density scans scheduled through her primary care office with Dr. Abarca.                    11/9/2022      CC:

## 2022-11-28 ENCOUNTER — OFFICE VISIT (OUTPATIENT)
Dept: OBSTETRICS AND GYNECOLOGY | Facility: CLINIC | Age: 67
End: 2022-11-28

## 2022-11-28 VITALS
DIASTOLIC BLOOD PRESSURE: 84 MMHG | SYSTOLIC BLOOD PRESSURE: 125 MMHG | BODY MASS INDEX: 27.42 KG/M2 | HEIGHT: 62 IN | WEIGHT: 149 LBS

## 2022-11-28 DIAGNOSIS — Z85.3 PERSONAL HISTORY OF BREAST CANCER: ICD-10-CM

## 2022-11-28 DIAGNOSIS — Z87.39 HISTORY OF OSTEOPOROSIS: ICD-10-CM

## 2022-11-28 DIAGNOSIS — Z01.419 ENCOUNTER FOR GYNECOLOGICAL EXAMINATION WITHOUT ABNORMAL FINDING: Primary | ICD-10-CM

## 2022-11-28 PROCEDURE — G0101 CA SCREEN;PELVIC/BREAST EXAM: HCPCS | Performed by: OBSTETRICS & GYNECOLOGY

## 2022-11-28 PROCEDURE — 3015F CERV CANCER SCREEN DOCD: CPT | Performed by: OBSTETRICS & GYNECOLOGY

## 2022-11-28 NOTE — PROGRESS NOTES
Subjective    Chief Complaint   Patient presents with   • Gynecologic Exam     AE      History of Present Illness    Chris Moreno is a 67 y.o. female who presents for annual exam.  Non-smoker.  Mammogram September 2022 normal.  History right mastectomy 2006 for breast cancer.  DEXA scan July 2022 osteoporosis.  Patient currently on Fosamax.  Colonoscopy 2016 and due again after 10 years.  No current problems.  No stress incontinence.    Obstetric History:  OB History    No obstetric history on file.        Menstrual History:     No LMP recorded.       Past Medical History:   Diagnosis Date   • Basal cell carcinoma of nose    • Breast cancer (HCC)     03/2006 Ductal carcinoma in situ T1N0M0, multifocal, R breast, s/p total R mastectomy and axillary lymphatic mapping, .   • Dry eye syndrome    • Fracture, humerus     11/2008 R lateral humeral epicondilar Fx, s/p Sx    • HLD (hyperlipidemia)    • Hypercholesterolemia    • Hyperglycemia    • Melanoma in situ (HCC)     1996, L calf melanoma, s/p removal   • Nulliparity    • Osteopenia    • Subclinical hypothyroidism    • Vitamin D deficiency      Family History   Problem Relation Age of Onset   • Diabetes Mother    • Breast cancer Mother    • Melanoma Mother    • Lung cancer Father    • Hypertension Sister    • Breast cancer Sister    • Lung cancer Maternal Grandmother    • Diabetes Maternal Grandmother    • Glaucoma Paternal Grandmother    • Diabetes Paternal Grandmother    • Lung cancer Maternal Grandfather    • Breast cancer Sister    • Melanoma Maternal Uncle      Social History     Tobacco Use   Smoking Status Never   Smokeless Tobacco Never         The following portions of the patient's history were reviewed and updated as appropriate: allergies, current medications, past family history, past medical history, past social history, past surgical history and problem list.    Review of Systems   Constitutional: Negative.  Negative for fever and  unexpected weight change.   HENT: Negative.    Respiratory: Negative for shortness of breath and wheezing.    Cardiovascular: Negative for chest pain, palpitations and leg swelling.   Gastrointestinal: Negative for abdominal pain, anal bleeding and blood in stool.   Genitourinary: Negative for dysuria, pelvic pain, urgency, vaginal bleeding, vaginal discharge and vaginal pain.   Skin: Negative.    Neurological: Negative.    Hematological: Negative.  Negative for adenopathy.   Psychiatric/Behavioral: Negative.  Negative for dysphoric mood. The patient is not nervous/anxious.             Objective   Physical Exam  Exam conducted with a chaperone present.   Constitutional:       Appearance: Normal appearance. She is well-developed.   HENT:      Head: Normocephalic.   Neck:      Thyroid: No thyromegaly.      Trachea: Trachea normal. No tracheal deviation.   Cardiovascular:      Rate and Rhythm: Normal rate and regular rhythm.      Heart sounds: Normal heart sounds. No murmur heard.  Pulmonary:      Effort: Pulmonary effort is normal.      Breath sounds: Normal breath sounds.   Chest:   Breasts:     Right: Normal. No mass, nipple discharge or tenderness.      Left: Normal. No mass, nipple discharge or tenderness.      Comments: Previous right mastectomy and TRAM flap reconstruction  Abdominal:      Palpations: Abdomen is soft. There is no mass.      Tenderness: There is no abdominal tenderness.      Hernia: No hernia is present.   Genitourinary:     General: Normal vulva.      Labia:         Right: No tenderness or lesion.         Left: No tenderness or lesion.       Urethra: No prolapse or urethral lesion.      Vagina: Normal. No vaginal discharge or lesions.      Cervix: No cervical motion tenderness.      Uterus: Not enlarged and not tender.       Adnexa:         Right: No mass or tenderness.          Left: No mass or tenderness.        Rectum: Normal. No external hemorrhoid or internal hemorrhoid. Normal anal tone.  "     Comments: External genitalia normal   Lymphadenopathy:      Cervical: No cervical adenopathy.      Upper Body:      Right upper body: No axillary adenopathy.      Left upper body: No axillary adenopathy.   Skin:     General: Skin is warm and dry.      Findings: No rash.   Neurological:      Mental Status: She is alert and oriented to person, place, and time.   Psychiatric:         Behavior: Behavior normal.         /84   Ht 157.3 cm (61.94\")   Wt 67.6 kg (149 lb)   BMI 27.31 kg/m²     Assessment & Plan   Diagnoses and all orders for this visit:    1. Encounter for gynecological examination without abnormal finding (Primary)  -     IGP,rfx Aptima HPV All Pth    2. History of osteoporosis    3. Personal history of breast cancer        Return to office 1 to 2 years.  Counseled about continuing vitamin D3 with calcium for osteoporosis treatment along with Fosamax.  Recheck DEXA scan in 1 to 2 years.             "

## 2023-04-10 ENCOUNTER — TELEMEDICINE (OUTPATIENT)
Dept: INTERNAL MEDICINE | Facility: CLINIC | Age: 68
End: 2023-04-10
Payer: MEDICARE

## 2023-04-10 ENCOUNTER — CLINICAL SUPPORT (OUTPATIENT)
Dept: INTERNAL MEDICINE | Facility: CLINIC | Age: 68
End: 2023-04-10
Payer: MEDICARE

## 2023-04-10 VITALS — SYSTOLIC BLOOD PRESSURE: 114 MMHG | DIASTOLIC BLOOD PRESSURE: 78 MMHG

## 2023-04-10 DIAGNOSIS — Z13.89 SCREENING FOR BLOOD OR PROTEIN IN URINE: Primary | ICD-10-CM

## 2023-04-10 DIAGNOSIS — Z13.89 SCREENING FOR BLOOD OR PROTEIN IN URINE: ICD-10-CM

## 2023-04-10 DIAGNOSIS — E78.00 PURE HYPERCHOLESTEROLEMIA: Chronic | ICD-10-CM

## 2023-04-10 DIAGNOSIS — M81.6 LOCALIZED OSTEOPOROSIS WITHOUT CURRENT PATHOLOGICAL FRACTURE: Primary | ICD-10-CM

## 2023-04-10 DIAGNOSIS — Z01.30 BLOOD PRESSURE CHECK: Primary | ICD-10-CM

## 2023-04-10 PROBLEM — R73.01 IMPAIRED FASTING BLOOD SUGAR: Status: RESOLVED | Noted: 2019-05-09 | Resolved: 2023-04-10

## 2023-04-10 PROCEDURE — 1159F MED LIST DOCD IN RCRD: CPT | Performed by: FAMILY MEDICINE

## 2023-04-10 PROCEDURE — 1160F RVW MEDS BY RX/DR IN RCRD: CPT | Performed by: FAMILY MEDICINE

## 2023-04-10 PROCEDURE — 99214 OFFICE O/P EST MOD 30 MIN: CPT | Performed by: FAMILY MEDICINE

## 2023-04-10 RX ORDER — TERBINAFINE HYDROCHLORIDE 250 MG/1
250 TABLET ORAL DAILY
COMMUNITY

## 2023-04-10 NOTE — PATIENT INSTRUCTIONS
"MyChart Tips:    MyChart is a useful part of our patient care program and is a great way for us to communicate lab results and for you to request refills.  Not all medical questions are appropriate for [facility] such as new medical issues that require taking a history, performing an exam, getting labs or studies, or researching medical questions needed to be addressed in the office.    Examples of medical issues that are APPROPRIATE for [facility]:  -Follow-up on problems we have already addressed in a visit such as home testing results, blood pressure readings, glucose readings  -Questions that can be answered with a simple \"yes\" or \"no\"    Communication that is NOT appropriate for MyChart:  -[facility] is not for new problems, serious problems or urgent problems.  [employer] messages are not email.  Staff will check messages each weekday.  We strive for a 48-hour turnaround on messaging.  Urgent matters should be addressed by phone, in the office, or the ER.  -[facility] is not for private issues.  Messages are received first by our office staff    Please allow up to 7 business days for lab results to be sent through [employer] to you before contacting your provider.  Sometimes we are waiting for results to get back from the lab and also your provider needs time to analyze them thoroughly before making recommendations.  While the internet has great resources, it is not a substitute for interpreting lab results.    Be mindful that [employer] messages become part of the permanent medical record.    We appreciate your respect for the limitations and boundaries of [employer].   "

## 2023-04-10 NOTE — PROGRESS NOTES
"Chief Complaint  Follow-up (Osteoporosis and hyperlipidemia)    Subjective         Chris Moreno presents to [facility]  History of Present Illness     She is following up today regarding her chronic osteoporosis treated with alendronate and for hyperlipidemia which is treated with zetia and rosuvastatin.  Tolerating the medications well.  She had labs completed as below about a which ago and were reviewed with her today.  They indicate she is stable with her cholesterol numbers, kidneys and liver.    July 2024 will be next DEXA.        Objective   Vital Signs:   /78     Estimated body mass index is 27.31 kg/m² as calculated from the following:    Height as of 11/28/22: 157.3 cm (61.94\").    Weight as of 11/28/22: 67.6 kg (149 lb).     Physical Exam   Constitutional: She appears well-developed and well-nourished. No distress.   HENT:   Right Ear: External ear normal.   Left Ear: External ear normal.   Eyes: Conjunctivae are normal.   Pulmonary/Chest: Effort normal.     Result Review :     Common labs    Common Labs 10/11/22 10/11/22 10/11/22 11/9/22 11/9/22 4/3/23 4/3/23 4/3/23    0904 0904 0904 1109 1109 0911 0911 0911   Glucose 96    115 99     BUN 16    15 17     Creatinine 0.75    0.88 0.85     Sodium 139    142 142     Potassium 4.3    4.1 4.3     Chloride 105    107 105     Calcium 9.2    9.5 10.0     Total Protein 6.7     7.2     Albumin 4.50    4.50 4.6     Total Bilirubin 0.8    0.7 0.7     Alkaline Phosphatase 62    60 61     AST (SGOT) 20    18 17     ALT (SGPT) 15    14 18     WBC  5.69  5.22   5.43    Hemoglobin  13.0  12.8   13.9    Hematocrit  38.7  38.7   39.6    Platelets  261  237   224    Total Cholesterol   188     193   Triglycerides   132     105   HDL Cholesterol   44     51   LDL Cholesterol    120 (A)     123 (A)   (A) Abnormal value       Comments are available for some flowsheets but are not being displayed.                     Assessment and Plan  "   Diagnoses and all orders for this visit:    1. Localized osteoporosis without current pathological fracture (Primary)  -     Comprehensive Metabolic Panel  -     CBC (No Diff); Future  -     Comprehensive Metabolic Panel; Future  -     Vitamin D,25-Hydroxy; Future  -     Phosphorus; Future    2. Pure hypercholesterolemia  -     CBC (No Diff); Future  -     Comprehensive Metabolic Panel; Future  -     Lipid Panel With LDL / HDL Ratio; Future      Clinically stable chronic conditions as above.  Continue all medications as above.  Labs reviewed/ordered as above.          Follow Up   Return in about 6 months (around 10/10/2023) for Next scheduled follow up.  Patient was given instructions and counseling regarding her condition or for health maintenance advice. Please see specific information pulled into the AVS if appropriate.     Mode of Visit: Video  Location of patient: home  Location of provider: home  You have chosen to receive care through a telehealth visit.  The patient has signed the video visit consent form.  The visit included audio and video interaction. No technical issues occurred during this visit.

## 2023-04-11 LAB
APPEARANCE UR: CLEAR
BACTERIA #/AREA URNS HPF: ABNORMAL /HPF
BILIRUB UR QL STRIP: NEGATIVE
CASTS URNS MICRO: ABNORMAL
COLOR UR: YELLOW
EPI CELLS #/AREA URNS HPF: ABNORMAL /HPF
GLUCOSE UR QL STRIP: NEGATIVE
HGB UR QL STRIP: NEGATIVE
KETONES UR QL STRIP: NEGATIVE
LEUKOCYTE ESTERASE UR QL STRIP: NEGATIVE
NITRITE UR QL STRIP: POSITIVE
PH UR STRIP: 5.5 [PH] (ref 5–8)
PROT UR QL STRIP: NEGATIVE
RBC #/AREA URNS HPF: ABNORMAL /HPF
SP GR UR STRIP: 1.02 (ref 1–1.03)
UROBILINOGEN UR STRIP-MCNC: ABNORMAL MG/DL
WBC #/AREA URNS HPF: ABNORMAL /HPF

## 2023-05-02 DIAGNOSIS — E78.00 PURE HYPERCHOLESTEROLEMIA: ICD-10-CM

## 2023-05-02 RX ORDER — EZETIMIBE 10 MG/1
TABLET ORAL
Qty: 90 TABLET | Refills: 2 | Status: SHIPPED | OUTPATIENT
Start: 2023-05-02

## 2023-07-21 ENCOUNTER — TELEPHONE (OUTPATIENT)
Dept: OBSTETRICS AND GYNECOLOGY | Facility: CLINIC | Age: 68
End: 2023-07-21

## 2023-07-21 DIAGNOSIS — Z12.31 ENCOUNTER FOR SCREENING MAMMOGRAM FOR MALIGNANT NEOPLASM OF BREAST: Primary | ICD-10-CM

## 2023-07-21 NOTE — TELEPHONE ENCOUNTER
Caller: Chris Moreno    Relationship: Self    Best call back number: [phone]    What orders are you requesting (i.e. lab or imaging): LEFT SCREENING MAMMOGRAM    In what timeframe would the patient need to come in: ASAP    Where will you receive your lab/imaging services: [facility] ON [address]    Additional notes: PT WOULD LIKE A CALL BACK

## 2023-08-08 DIAGNOSIS — Z12.31 ENCOUNTER FOR SCREENING MAMMOGRAM FOR MALIGNANT NEOPLASM OF BREAST: Primary | ICD-10-CM

## 2023-08-09 NOTE — TELEPHONE ENCOUNTER
Called pt and she scheduled for 9/5/23 @ 1045am at [facility].  Referral has been faxed.

## 2023-08-25 DIAGNOSIS — J30.9 ALLERGIC RHINITIS, UNSPECIFIED SEASONALITY, UNSPECIFIED TRIGGER: ICD-10-CM

## 2023-08-29 RX ORDER — FLUTICASONE PROPIONATE 50 MCG
SPRAY, SUSPENSION (ML) NASAL
Qty: 48 ML | Refills: 3 | Status: SHIPPED | OUTPATIENT
Start: 2023-08-29

## 2023-09-07 DIAGNOSIS — Z12.31 ENCOUNTER FOR SCREENING MAMMOGRAM FOR MALIGNANT NEOPLASM OF BREAST: ICD-10-CM

## 2023-09-09 DIAGNOSIS — E78.00 PURE HYPERCHOLESTEROLEMIA: ICD-10-CM

## 2023-09-11 RX ORDER — ROSUVASTATIN CALCIUM 20 MG/1
TABLET, COATED ORAL
Qty: 90 TABLET | Refills: 0 | Status: SHIPPED | OUTPATIENT
Start: 2023-09-11

## 2023-10-06 DIAGNOSIS — M81.6 LOCALIZED OSTEOPOROSIS WITHOUT CURRENT PATHOLOGICAL FRACTURE: ICD-10-CM

## 2023-10-06 DIAGNOSIS — E78.00 PURE HYPERCHOLESTEROLEMIA: Chronic | ICD-10-CM

## 2023-10-13 LAB
25(OH)D3+25(OH)D2 SERPL-MCNC: 35 NG/ML (ref 30–100)
ALBUMIN SERPL-MCNC: 4.7 G/DL (ref 3.5–5.2)
ALBUMIN/GLOB SERPL: 2.1 G/DL
ALP SERPL-CCNC: 59 U/L (ref 39–117)
ALT SERPL-CCNC: 12 U/L (ref 1–33)
AST SERPL-CCNC: 16 U/L (ref 1–32)
BILIRUB SERPL-MCNC: 0.8 MG/DL (ref 0–1.2)
BUN SERPL-MCNC: 17 MG/DL (ref 8–23)
BUN/CREAT SERPL: 23.9 (ref 7–25)
CALCIUM SERPL-MCNC: 9.5 MG/DL (ref 8.6–10.5)
CHLORIDE SERPL-SCNC: 108 MMOL/L (ref 98–107)
CHOLEST SERPL-MCNC: 196 MG/DL (ref 0–200)
CO2 SERPL-SCNC: 23.4 MMOL/L (ref 22–29)
CREAT SERPL-MCNC: 0.71 MG/DL (ref 0.57–1)
EGFRCR SERPLBLD CKD-EPI 2021: 91.6 ML/MIN/1.73
ERYTHROCYTE [DISTWIDTH] IN BLOOD BY AUTOMATED COUNT: 12.4 % (ref 12.3–15.4)
GLOBULIN SER CALC-MCNC: 2.2 GM/DL
GLUCOSE SERPL-MCNC: 95 MG/DL (ref 65–99)
HCT VFR BLD AUTO: 39 % (ref 34–46.6)
HDLC SERPL-MCNC: 47 MG/DL (ref 40–60)
HGB BLD-MCNC: 13.6 G/DL (ref 12–15.9)
LDLC SERPL CALC-MCNC: 129 MG/DL (ref 0–100)
LDLC/HDLC SERPL: 2.7 {RATIO}
MCH RBC QN AUTO: 31.4 PG (ref 26.6–33)
MCHC RBC AUTO-ENTMCNC: 34.9 G/DL (ref 31.5–35.7)
MCV RBC AUTO: 90.1 FL (ref 79–97)
PHOSPHATE SERPL-MCNC: 3.8 MG/DL (ref 2.5–4.5)
PLATELET # BLD AUTO: 230 10*3/MM3 (ref 140–450)
POTASSIUM SERPL-SCNC: 4.1 MMOL/L (ref 3.5–5.2)
PROT SERPL-MCNC: 6.9 G/DL (ref 6–8.5)
RBC # BLD AUTO: 4.33 10*6/MM3 (ref 3.77–5.28)
SODIUM SERPL-SCNC: 143 MMOL/L (ref 136–145)
TRIGL SERPL-MCNC: 110 MG/DL (ref 0–150)
VLDLC SERPL CALC-MCNC: 20 MG/DL (ref 5–40)
WBC # BLD AUTO: 5.29 10*3/MM3 (ref 3.4–10.8)

## 2023-10-20 ENCOUNTER — OFFICE VISIT (OUTPATIENT)
Dept: INTERNAL MEDICINE | Facility: CLINIC | Age: 68
End: 2023-10-20
Payer: MEDICARE

## 2023-10-20 VITALS
RESPIRATION RATE: 18 BRPM | WEIGHT: 149 LBS | DIASTOLIC BLOOD PRESSURE: 74 MMHG | SYSTOLIC BLOOD PRESSURE: 122 MMHG | HEIGHT: 61 IN | HEART RATE: 84 BPM | BODY MASS INDEX: 28.13 KG/M2

## 2023-10-20 DIAGNOSIS — M85.852 OSTEOPENIA OF LEFT HIP: ICD-10-CM

## 2023-10-20 DIAGNOSIS — M81.6 LOCALIZED OSTEOPOROSIS WITHOUT CURRENT PATHOLOGICAL FRACTURE: Chronic | ICD-10-CM

## 2023-10-20 DIAGNOSIS — E55.9 VITAMIN D DEFICIENCY: Chronic | ICD-10-CM

## 2023-10-20 DIAGNOSIS — E78.00 PURE HYPERCHOLESTEROLEMIA: Primary | Chronic | ICD-10-CM

## 2023-10-20 PROCEDURE — 1159F MED LIST DOCD IN RCRD: CPT | Performed by: FAMILY MEDICINE

## 2023-10-20 PROCEDURE — 99214 OFFICE O/P EST MOD 30 MIN: CPT | Performed by: FAMILY MEDICINE

## 2023-10-20 PROCEDURE — 1160F RVW MEDS BY RX/DR IN RCRD: CPT | Performed by: FAMILY MEDICINE

## 2023-10-20 NOTE — PROGRESS NOTES
Chief Complaint  Follow-up and Hyperlipidemia    Subjective        Chris Moreno presents to [facility]  History of Present Illness    She is here for 6-month follow-up.  She had her labs completed on October 13, 2023 and we went over the results today.  I see her for the hyperlipidemia, vitamin D deficiency as well as osteopenia of left hip and osteoporosis of right hip.  She is taking all of her medications as prescribed.  Complete metabolic panel was normal.  Her cholesterol was within decent limits for her age and risk factors.  The only abnormality was an elevated LDL at 129 but good numbers elsewhere.  Vitamin D was within normal limits and CBC was normal.  She is taking her vitamin D supplement.  On the alendronate for approx 3 years.      Current Outpatient Medications:     alendronate (FOSAMAX) 70 MG tablet, Take 1 tablet by mouth Every 7 (Seven) Days. Take on empty stomach with full glass of water. For the next 30 min  Do not bend over, eat or lay down., Disp: 12 tablet, Rfl: 4    Calcium Citrate (CITRACAL PO), Take  by mouth Daily., Disp: , Rfl:     cetirizine (zyrTEC) 10 MG tablet, , Disp: , Rfl:     Cholecalciferol (VITAMIN D) 2000 UNITS capsule, Take 1 capsule by mouth Daily., Disp: , Rfl:     ezetimibe (ZETIA) 10 MG tablet, TAKE 1 TABLET BY MOUTH EVERY DAY, Disp: 90 tablet, Rfl: 2    fluticasone (FLONASE) 50 MCG/ACT nasal spray, INSTILL TWO SPRAYS INTO EACH NOSTRIL DAILY AS DIRECTED BY PROVIDER, Disp: 48 mL, Rfl: 3    ketoconazole (NIZORAL) 2 % cream, Apply  topically to the appropriate area as directed Daily., Disp: , Rfl:     olopatadine (PATADAY) 0.2 % solution ophthalmic solution, ADMINISTER 1 DROP TO BOTH EYES DAILY., Disp: 2.5 mL, Rfl: 11    rosuvastatin (CRESTOR) 20 MG tablet, TAKE 1 TABLET BY MOUTH EVERY DAY, Disp: 90 tablet, Rfl: 0    tretinoin (RETIN-A) 0.025 % cream, APPLY A SMALL AMOUNT TO AFFECTED AREA EVERY EVENING, Disp: , Rfl:       Objective   Vital  "Signs:  /74 (BP Location: Left arm, Patient Position: Sitting, Cuff Size: Small Adult)   Pulse 84   Resp 18   Ht 154.9 cm (61\")   Wt 67.6 kg (149 lb)   BMI 28.15 kg/m²   Estimated body mass index is 28.15 kg/m² as calculated from the following:    Height as of this encounter: 154.9 cm (61\").    Weight as of this encounter: 67.6 kg (149 lb).               Physical Exam  Vitals and nursing note reviewed.   Constitutional:       General: She is not in acute distress.     Appearance: Normal appearance.   Cardiovascular:      Rate and Rhythm: Normal rate and regular rhythm.      Heart sounds: Normal heart sounds. No murmur heard.  Pulmonary:      Effort: Pulmonary effort is normal.      Breath sounds: Normal breath sounds.   Neurological:      Mental Status: She is alert.        Result Review :  The following data was reviewed by: Boyd Abarca MD on 10/20/2023:  Common labs          11/9/2022    11:09 4/3/2023    09:11 10/13/2023    08:25   Common Labs   Glucose 115  99  95    BUN 15  17  17    Creatinine 0.88  0.85  0.71    Sodium 142  142  143    Potassium 4.1  4.3  4.1    Chloride 107  105  108    Calcium 9.5  10.0  9.5    Total Protein  7.2  6.9    Albumin 4.50  4.6  4.7    Total Bilirubin 0.7  0.7  0.8    Alkaline Phosphatase 60  61  59    AST (SGOT) 18  17  16    ALT (SGPT) 14  18  12    WBC 5.22  5.43  5.29    Hemoglobin 12.8  13.9  13.6    Hematocrit 38.7  39.6  39.0    Platelets 237  224  230    Total Cholesterol  193  196    Triglycerides  105  110    HDL Cholesterol  51  47    LDL Cholesterol   123  129                   Assessment and Plan   Diagnoses and all orders for this visit:    1. Pure hypercholesterolemia (Primary)  -     CBC (No Diff); Future  -     Comprehensive Metabolic Panel; Future  -     Lipid Panel With LDL / HDL Ratio; Future    2. Vitamin D deficiency  -     Vitamin D,25-Hydroxy; Future    3. Localized osteoporosis without current pathological fracture  -     DEXA Bone Density " Axial; Future  -     Comprehensive Metabolic Panel; Future  -     Phosphorus; Future    4. Osteopenia of left hip  -     DEXA Bone Density Axial; Future  -     Comprehensive Metabolic Panel; Future  -     Phosphorus; Future        Clinically stable chronic conditions as above.  Continue all medications/supplements as above.  Labs reviewed/ordered as above.  She will be due for DEXA scan in the middle of next year.           Follow Up   Return in about 9 months (around 7/31/2024) for Medicare Wellness.  Patient was given instructions and counseling regarding her condition or for health maintenance advice. Please see specific information pulled into the AVS if appropriate.

## 2023-10-20 NOTE — PATIENT INSTRUCTIONS
"MyChart Tips:    MyChart is a useful part of our patient care program and is a great way for us to communicate lab results and for you to request refills.  Not all medical questions are appropriate for MyChart such as new medical issues that require taking a history, performing an exam, getting labs/studies or researching medical questions needed to be addressed in the office.    Examples of medical issues that are APPROPRIATE for MyChart:  -Follow-up on problems we have already addressed in a visit such as home testing results, blood pressure readings, glucose readings  -Questions that can be answered with a simple \"yes\" or \"no\"    Communication that is NOT APPROPRIATE for MyChart:  -MyChart is not for new problems, serious problems or urgent problems.  Urgent matters should be addressed by phone, in the office, urgent care or the ER.  -NextDocs messages are not email.  Staff will check messages each weekday.  We strive for a 48-hour turnaround on messaging.    -[employer] is not for private issues.  Messages are received first by our office staff.    Please allow up to 7 business days for lab results to be sent through NextDocs to you before contacting your provider.  Sometimes we are waiting for results to get back from the lab and also your provider needs time to analyze them thoroughly before making recommendations.  While the internet has great resources, it is not a substitute for interpreting lab results.    We also ask that you not send [employer] messages and telephone calls regarding the same issue simultaneously.  This slows down the process of returning your call/message and confuses staff.    Be mindful that [employer] messages and telephone calls become part of your permanent medical record.    Lastly, your provider cannot access your [employer].  It is a service which communicates with the EHR (Electronic Health Record).  Sometimes there are errors in NextDocs that staff and providers cannot see and these errors " are not part of your EHR.  Vaccines and screening reminders have been incorrect in MyChart.    We appreciate your respect for the limitations and boundaries of [employer].

## 2023-11-08 ENCOUNTER — LAB (OUTPATIENT)
Dept: LAB | Facility: HOSPITAL | Age: 68
End: 2023-11-08
Payer: MEDICARE

## 2023-11-08 ENCOUNTER — OFFICE VISIT (OUTPATIENT)
Dept: ONCOLOGY | Facility: CLINIC | Age: 68
End: 2023-11-08
Payer: MEDICARE

## 2023-11-08 VITALS
RESPIRATION RATE: 18 BRPM | HEIGHT: 61 IN | WEIGHT: 143.6 LBS | HEART RATE: 75 BPM | SYSTOLIC BLOOD PRESSURE: 105 MMHG | TEMPERATURE: 96 F | DIASTOLIC BLOOD PRESSURE: 74 MMHG | BODY MASS INDEX: 27.11 KG/M2 | OXYGEN SATURATION: 95 %

## 2023-11-08 DIAGNOSIS — Z85.3 HISTORY OF BREAST CANCER: ICD-10-CM

## 2023-11-08 DIAGNOSIS — M85.80 OSTEOPENIA, UNSPECIFIED LOCATION: ICD-10-CM

## 2023-11-08 DIAGNOSIS — Z85.820 HISTORY OF MELANOMA: ICD-10-CM

## 2023-11-08 DIAGNOSIS — D05.11 DUCTAL CARCINOMA IN SITU (DCIS) OF RIGHT BREAST: Primary | ICD-10-CM

## 2023-11-08 DIAGNOSIS — M81.6 LOCALIZED OSTEOPOROSIS WITHOUT CURRENT PATHOLOGICAL FRACTURE: Chronic | ICD-10-CM

## 2023-11-08 DIAGNOSIS — D05.11 DUCTAL CARCINOMA IN SITU (DCIS) OF RIGHT BREAST: ICD-10-CM

## 2023-11-08 LAB
ALBUMIN SERPL-MCNC: 4 G/DL (ref 3.5–5.2)
ALBUMIN/GLOB SERPL: 1.6 G/DL
ALP SERPL-CCNC: 57 U/L (ref 39–117)
ALT SERPL W P-5'-P-CCNC: 8 U/L (ref 1–33)
ANION GAP SERPL CALCULATED.3IONS-SCNC: 14.1 MMOL/L (ref 5–15)
AST SERPL-CCNC: 18 U/L (ref 1–32)
BASOPHILS # BLD AUTO: 0.05 10*3/MM3 (ref 0–0.2)
BASOPHILS NFR BLD AUTO: 0.9 % (ref 0–1.5)
BILIRUB SERPL-MCNC: 0.7 MG/DL (ref 0–1.2)
BUN SERPL-MCNC: 17 MG/DL (ref 8–23)
BUN/CREAT SERPL: 22.7 (ref 7–25)
CALCIUM SPEC-SCNC: 9.2 MG/DL (ref 8.6–10.5)
CHLORIDE SERPL-SCNC: 106 MMOL/L (ref 98–107)
CO2 SERPL-SCNC: 21.9 MMOL/L (ref 22–29)
CREAT SERPL-MCNC: 0.75 MG/DL (ref 0.6–1.1)
DEPRECATED RDW RBC AUTO: 41.7 FL (ref 37–54)
EGFRCR SERPLBLD CKD-EPI 2021: 85.8 ML/MIN/1.73
EOSINOPHIL # BLD AUTO: 0.19 10*3/MM3 (ref 0–0.4)
EOSINOPHIL NFR BLD AUTO: 3.6 % (ref 0.3–6.2)
ERYTHROCYTE [DISTWIDTH] IN BLOOD BY AUTOMATED COUNT: 12.5 % (ref 12.3–15.4)
GLOBULIN UR ELPH-MCNC: 2.5 GM/DL
GLUCOSE SERPL-MCNC: 102 MG/DL (ref 65–99)
HCT VFR BLD AUTO: 38.4 % (ref 34–46.6)
HGB BLD-MCNC: 13.4 G/DL (ref 12–15.9)
IMM GRANULOCYTES # BLD AUTO: 0.01 10*3/MM3 (ref 0–0.05)
IMM GRANULOCYTES NFR BLD AUTO: 0.2 % (ref 0–0.5)
LYMPHOCYTES # BLD AUTO: 2.16 10*3/MM3 (ref 0.7–3.1)
LYMPHOCYTES NFR BLD AUTO: 41 % (ref 19.6–45.3)
MCH RBC QN AUTO: 31.9 PG (ref 26.6–33)
MCHC RBC AUTO-ENTMCNC: 34.9 G/DL (ref 31.5–35.7)
MCV RBC AUTO: 91.4 FL (ref 79–97)
MONOCYTES # BLD AUTO: 0.36 10*3/MM3 (ref 0.1–0.9)
MONOCYTES NFR BLD AUTO: 6.8 % (ref 5–12)
NEUTROPHILS NFR BLD AUTO: 2.5 10*3/MM3 (ref 1.7–7)
NEUTROPHILS NFR BLD AUTO: 47.5 % (ref 42.7–76)
NRBC BLD AUTO-RTO: 0 /100 WBC (ref 0–0.2)
PLATELET # BLD AUTO: 239 10*3/MM3 (ref 140–450)
PMV BLD AUTO: 10.6 FL (ref 6–12)
POTASSIUM SERPL-SCNC: 4.2 MMOL/L (ref 3.5–5.2)
PROT SERPL-MCNC: 6.5 G/DL (ref 6–8.5)
RBC # BLD AUTO: 4.2 10*6/MM3 (ref 3.77–5.28)
SODIUM SERPL-SCNC: 142 MMOL/L (ref 136–145)
WBC NRBC COR # BLD: 5.27 10*3/MM3 (ref 3.4–10.8)

## 2023-11-08 PROCEDURE — 80053 COMPREHEN METABOLIC PANEL: CPT

## 2023-11-08 PROCEDURE — 85025 COMPLETE CBC W/AUTO DIFF WBC: CPT

## 2023-11-08 PROCEDURE — 36415 COLL VENOUS BLD VENIPUNCTURE: CPT

## 2023-11-08 NOTE — PROGRESS NOTES
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   Negative genetic testing for BRCA1 and BRCA2 in July 2006.   Osteopenia  HISTORY OF PRESENT ILLNESS:   The patient is a 68 y.o. female with the above-noted history of DCIS and microinvasion in the right breast in 2006. She underwent mastectomy and TRAM flap reconstruction. She was started on Evista as a breast cancer prevention and  elected to remain on Evista after completing 5 years of therapy due to the fact that she also has significant osteopenia.  She eventually was taken off Evista by her primary care physician and switched to Fosamax due to further decline in her bone density in 2020.      INTERVAL HISTORY:  In 2022 she had been referred to the breast care center due to palpable mass in the TRAM flap reconstruction.  As part of that visit in February 2022 she had genetic testing which was negative.    She remains on Fosamax for her osteopenia.    Left breast mammogram from 9/5/2023 showed no suspicious findings.    History of Present Illness      Past Medical History, Past Surgical History, Social History, Family History have been reviewed and are without significant changes except as mentioned.    Review of Systems   Constitutional:  Negative for activity change, appetite change, fatigue, fever and unexpected weight change.   HENT:  Negative for hearing loss, nosebleeds, trouble swallowing and voice change.    Eyes:  Negative for visual disturbance.   Respiratory:  Negative for cough, shortness of breath and wheezing.    Cardiovascular:  Negative for chest pain and palpitations.   Gastrointestinal:  Negative for abdominal pain, diarrhea, nausea and vomiting.   Genitourinary:  Negative for difficulty urinating, frequency, hematuria and urgency.   Musculoskeletal:  Negative for back pain and neck pain.   Skin:  Negative for rash.   Neurological:   Negative for dizziness, seizures, syncope and headaches.   Hematological:  Negative for adenopathy. Does not bruise/bleed easily.   Psychiatric/Behavioral:  Negative for behavioral problems. The patient is not nervous/anxious.      A comprehensive 14 point review of systems was performed and was negative except as mentioned.    Medications:  The current medication list was reviewed in the EMR    ALLERGIES:  No Known Allergies    Objective      There were no vitals filed for this visit.        11/9/2022    12:00 PM   Current Status   ECOG score 0       Physical Exam   Constitutional: She is oriented to person, place, and time. She appears well-developed. No distress.   HENT:   Head: Normocephalic.   Eyes: Pupils are equal, round, and reactive to light.   Neck: No JVD present. No thyromegaly present.   Cardiovascular: Normal rate and regular rhythm. Exam reveals no gallop and no friction rub.   No murmur heard.  Pulmonary/Chest: Effort normal and breath sounds normal. She has no wheezes. She has no rales. Left breast exhibits no mass, no nipple discharge and no skin change.   Right TRAM reconstruction.   Abdominal: Soft. Bowel sounds are normal. She exhibits no distension and no mass. There is no abdominal tenderness.   Musculoskeletal: No deformity.   Neurological: She is alert and oriented to person, place, and time. She has normal reflexes. No cranial nerve deficit.   Skin: Skin is dry. No rash noted.   Small basal cell carcinoma of the left nostril   Psychiatric: Judgment normal.     I have reexamined the patient and the results are consistent with the previously documented exam. Gilbert Perez MD     RECENT LABS:  Hematology WBC   Date Value Ref Range Status   11/08/2023 5.27 3.40 - 10.80 10*3/mm3 Final   10/13/2023 5.29 3.40 - 10.80 10*3/mm3 Final     RBC   Date Value Ref Range Status   11/08/2023 4.20 3.77 - 5.28 10*6/mm3 Final   10/13/2023 4.33 3.77 - 5.28 10*6/mm3 Final     Hemoglobin   Date Value Ref Range  Status   11/08/2023 13.4 12.0 - 15.9 g/dL Final     Hematocrit   Date Value Ref Range Status   11/08/2023 38.4 34.0 - 46.6 % Final     Platelets   Date Value Ref Range Status   11/08/2023 239 140 - 450 10*3/mm3 Final          MAMMOGRAM 9/5/2023          IMPRESSION:   There is no mammographic evidence of malignancy.     Screening Mammogram in 1 year is recommended.     BI-RADS Category 1:   Negative       Assessment & Plan   1.  DCIS with microinvasion of the right breast status post mastectomy and TRAM flap reconstruction.  She also received therapy with Evista as breast cancer prevention strategy.  After 5 years of Evista she elected to remain on the medication due to her osteopenia.  As noted above, this has since been discontinued and she is now on weekly Fosamax prescribed by her primary care physician.  2.  Melanoma excised from the leg.  She continues regular follow-up with her dermatologist.  3.  Basal cell carcinoma of the left nostril.  She underwent successful Mohs microsurgery around this time last year.     Plan  1.  She is doing well now 17 years out from her original diagnosis.  At this point we offered her the option of returning to our office on an as-needed basis only if she has new findings or concerns.  She was more comfortable with the thought of continuing to follow-up yearly therefore we will schedule her an appointment next year but she knows to call us sooner if she has any concerns.  2.  Her next mammogram  will be due in September 2024.                      11/8/2023      CC:

## 2023-12-07 DIAGNOSIS — E78.00 PURE HYPERCHOLESTEROLEMIA: ICD-10-CM

## 2023-12-07 RX ORDER — ROSUVASTATIN CALCIUM 20 MG/1
TABLET, COATED ORAL
Qty: 90 TABLET | Refills: 0 | Status: SHIPPED | OUTPATIENT
Start: 2023-12-07

## 2024-01-09 DIAGNOSIS — M85.89 OSTEOPENIA OF MULTIPLE SITES: ICD-10-CM

## 2024-01-09 RX ORDER — ALENDRONATE SODIUM 70 MG/1
70 TABLET ORAL
Qty: 12 TABLET | Refills: 4 | Status: SHIPPED | OUTPATIENT
Start: 2024-01-09

## 2024-01-09 NOTE — TELEPHONE ENCOUNTER
Caller: Chris Moreno    Relationship: Self    Best call back number: [phone]     Requested Prescriptions:   Requested Prescriptions     Pending Prescriptions Disp Refills    alendronate (FOSAMAX) 70 MG tablet 12 tablet 4     Sig: Take 1 tablet by mouth Every 7 (Seven) Days. Take on empty stomach with full glass of water. For the next 30 min  Do not bend over, eat or lay down.        Pharmacy where request should be sent: [facility]Y #[id number] [address] AT [address]AR [facility] [phone] [facility] [phone] FX     Last office visit with prescribing clinician: 10/20/2023   Last telemedicine visit with prescribing clinician: Visit date not found   Next office visit with prescribing clinician: 7/26/2024     Does the patient have less than a 3 day supply:  [x] Yes  [] No    Would you like a call back once the refill request has been completed: [] Yes [x] No    If the office needs to give you a call back, can they leave a voicemail: [] Yes [x] No    Edith Steinberg Rep   01/09/24 15:33 EST

## 2024-01-30 ENCOUNTER — TRANSCRIBE ORDERS (OUTPATIENT)
Dept: ADMINISTRATIVE | Facility: HOSPITAL | Age: 69
End: 2024-01-30
Payer: MEDICARE

## 2024-01-30 DIAGNOSIS — Z13.6 ENCOUNTER FOR SCREENING FOR VASCULAR DISEASE: Primary | ICD-10-CM

## 2024-02-02 DIAGNOSIS — E78.00 PURE HYPERCHOLESTEROLEMIA: ICD-10-CM

## 2024-02-02 RX ORDER — EZETIMIBE 10 MG/1
TABLET ORAL
Qty: 90 TABLET | Refills: 2 | Status: SHIPPED | OUTPATIENT
Start: 2024-02-02

## 2024-02-12 ENCOUNTER — HOSPITAL ENCOUNTER (OUTPATIENT)
Dept: CARDIOLOGY | Facility: HOSPITAL | Age: 69
Discharge: HOME | End: 2024-02-12
Admitting: FAMILY MEDICINE

## 2024-02-12 DIAGNOSIS — Z13.6 ENCOUNTER FOR SCREENING FOR VASCULAR DISEASE: ICD-10-CM

## 2024-02-12 LAB
BH CV VAS SCREENING CAROTID CCA LEFT: 85 CM/SEC
BH CV VAS SCREENING CAROTID CCA RIGHT: 90 CM/SEC
BH CV VAS SCREENING CAROTID ICA LEFT: 78 CM/SEC
BH CV VAS SCREENING CAROTID ICA RIGHT: 63 CM/SEC
BH CV XLRA MEAS - MID AO DIAM: 1.7 CM
BH CV XLRA MEAS - PAD LEFT ABI PT: 1.2
BH CV XLRA MEAS - PAD LEFT ARM: 119 MMHG
BH CV XLRA MEAS - PAD LEFT LEG PT: 143 MMHG
BH CV XLRA MEAS - PAD RIGHT ABI PT: 1.24
BH CV XLRA MEAS - PAD RIGHT LEG PT: 147 MMHG
BH CV XLRA MEAS LEFT DIST CCA EDV: 34.8 CM/SEC
BH CV XLRA MEAS LEFT DIST CCA PSV: 84.5 CM/SEC
BH CV XLRA MEAS LEFT ICA/CCA RATIO: 0.9
BH CV XLRA MEAS LEFT PROX ICA EDV: -30.4 CM/SEC
BH CV XLRA MEAS LEFT PROX ICA PSV: -78.3 CM/SEC
BH CV XLRA MEAS RIGHT DIST CCA EDV: 28.6 CM/SEC
BH CV XLRA MEAS RIGHT DIST CCA PSV: 89.5 CM/SEC
BH CV XLRA MEAS RIGHT ICA/CCA RATIO: 0.7
BH CV XLRA MEAS RIGHT PROX ICA EDV: -24.2 CM/SEC
BH CV XLRA MEAS RIGHT PROX ICA PSV: -63.4 CM/SEC

## 2024-02-12 PROCEDURE — 93799 UNLISTED CV SVC/PROCEDURE: CPT

## 2024-02-14 ENCOUNTER — OFFICE VISIT (OUTPATIENT)
Dept: INTERNAL MEDICINE | Facility: CLINIC | Age: 69
End: 2024-02-14
Payer: MEDICARE

## 2024-02-14 VITALS
OXYGEN SATURATION: 98 % | HEIGHT: 60 IN | SYSTOLIC BLOOD PRESSURE: 116 MMHG | RESPIRATION RATE: 18 BRPM | WEIGHT: 144 LBS | HEART RATE: 74 BPM | BODY MASS INDEX: 28.27 KG/M2 | DIASTOLIC BLOOD PRESSURE: 78 MMHG

## 2024-02-14 DIAGNOSIS — Z83.49 FAMILY HISTORY OF THYROIDITIS: ICD-10-CM

## 2024-02-14 DIAGNOSIS — E04.1 LEFT THYROID NODULE: Primary | ICD-10-CM

## 2024-02-15 LAB
T3FREE SERPL-MCNC: 3.2 PG/ML (ref 2–4.4)
T4 FREE SERPL-MCNC: 1.26 NG/DL (ref 0.82–1.77)
THYROGLOB AB SERPL-ACNC: <1 IU/ML (ref 0–0.9)
THYROPEROXIDASE AB SERPL-ACNC: <9 IU/ML (ref 0–34)
TSH SERPL DL<=0.005 MIU/L-ACNC: 2.46 UIU/ML (ref 0.45–4.5)

## 2024-02-26 ENCOUNTER — HOSPITAL ENCOUNTER (OUTPATIENT)
Dept: ULTRASOUND IMAGING | Facility: HOSPITAL | Age: 69
Discharge: HOME | End: 2024-02-26
Admitting: FAMILY MEDICINE
Payer: MEDICARE

## 2024-02-26 DIAGNOSIS — Z83.49 FAMILY HISTORY OF THYROIDITIS: ICD-10-CM

## 2024-02-26 DIAGNOSIS — E04.1 LEFT THYROID NODULE: ICD-10-CM

## 2024-02-26 PROCEDURE — 76536 US EXAM OF HEAD AND NECK: CPT

## 2024-02-29 PROBLEM — E04.2 MULTIPLE THYROID NODULES: Status: ACTIVE | Noted: 2024-02-29

## 2024-03-14 DIAGNOSIS — E78.00 PURE HYPERCHOLESTEROLEMIA: ICD-10-CM

## 2024-03-14 RX ORDER — ROSUVASTATIN CALCIUM 20 MG/1
TABLET, COATED ORAL
Qty: 90 TABLET | Refills: 1 | Status: SHIPPED | OUTPATIENT
Start: 2024-03-14

## 2024-07-08 DIAGNOSIS — M85.852 OSTEOPENIA OF LEFT HIP: ICD-10-CM

## 2024-07-08 DIAGNOSIS — E78.00 PURE HYPERCHOLESTEROLEMIA: Chronic | ICD-10-CM

## 2024-07-08 DIAGNOSIS — E55.9 VITAMIN D DEFICIENCY: Chronic | ICD-10-CM

## 2024-07-08 DIAGNOSIS — M81.6 LOCALIZED OSTEOPOROSIS WITHOUT CURRENT PATHOLOGICAL FRACTURE: Chronic | ICD-10-CM

## 2024-07-16 ENCOUNTER — HOSPITAL ENCOUNTER (OUTPATIENT)
Dept: BONE DENSITY | Facility: HOSPITAL | Age: 69
Discharge: HOME | End: 2024-07-16
Admitting: FAMILY MEDICINE
Payer: MEDICARE

## 2024-07-16 DIAGNOSIS — M85.852 OSTEOPENIA OF LEFT HIP: ICD-10-CM

## 2024-07-16 DIAGNOSIS — M81.6 LOCALIZED OSTEOPOROSIS WITHOUT CURRENT PATHOLOGICAL FRACTURE: Chronic | ICD-10-CM

## 2024-07-16 PROCEDURE — 77080 DXA BONE DENSITY AXIAL: CPT

## 2024-07-26 ENCOUNTER — OFFICE VISIT (OUTPATIENT)
Dept: INTERNAL MEDICINE | Facility: CLINIC | Age: 69
End: 2024-07-26
Payer: MEDICARE

## 2024-07-26 VITALS
BODY MASS INDEX: 29.06 KG/M2 | DIASTOLIC BLOOD PRESSURE: 63 MMHG | WEIGHT: 148 LBS | HEART RATE: 74 BPM | RESPIRATION RATE: 18 BRPM | OXYGEN SATURATION: 96 % | SYSTOLIC BLOOD PRESSURE: 110 MMHG | HEIGHT: 60 IN

## 2024-07-26 DIAGNOSIS — M81.6 LOCALIZED OSTEOPOROSIS WITHOUT CURRENT PATHOLOGICAL FRACTURE: Chronic | ICD-10-CM

## 2024-07-26 DIAGNOSIS — M85.852 OSTEOPENIA OF LEFT HIP: Chronic | ICD-10-CM

## 2024-07-26 DIAGNOSIS — E55.9 VITAMIN D DEFICIENCY: Chronic | ICD-10-CM

## 2024-07-26 DIAGNOSIS — E78.00 PURE HYPERCHOLESTEROLEMIA: Chronic | ICD-10-CM

## 2024-07-26 DIAGNOSIS — Z00.00 MEDICARE ANNUAL WELLNESS VISIT, SUBSEQUENT: Primary | ICD-10-CM

## 2024-07-26 NOTE — PATIENT INSTRUCTIONS
"MyChart Tips:    [employer] can be a useful part of our patient care program and is a way for us to communicate lab results and for you to request refills.  Here is some information regarding the best way to use [employer] for communication.       Examples of medical issues that are APPROPRIATE for [employer]:  -Follow-up on problems we have already addressed in a visit such as home testing results, blood pressure readings, glucose readings  -Questions that can be answered with a simple \"yes\" or \"no\"  -Please keep communication concise and to the point.  All other types of communication should be held for an office visit.    Communication that is NOT APPROPRIATE for [employer]:  -New problems, serious problems or urgent problems.  Urgent matters should be addressed by phone for advice, in the office, urgent care or the ER.  -Requesting Paxlovid or Antibiotics: These types of problems require an evaluation unless your provider approved this previously.    -[employer] messages are not email.  Staff will check messages each weekday.  We strive for a 48-hour turnaround on messaging.    -[employer] is not for private issues.  Messages are received first by our office staff.    Please be mindful that sometimes it may take longer to receive a response on [employer].  Many times of the year we have high volumes of messages coming into physician inboxes.      Please also be mindful that [employer] messages become part of your permanent medical record.    We appreciate your respect for the limitations and boundaries of [employer].      Lab Results:  Please allow up to 7 business days for lab results to be sent through [employer] to you before contacting your provider.  Sometimes we are waiting for results to get back from the lab and also your provider needs time to analyze them thoroughly before making recommendations.  Also, providers may be out of the office on vacation.      While the internet has great resources, it is not a substitute for " interpreting lab results.

## 2024-07-26 NOTE — PROGRESS NOTES
Subjective   The ABCs of the Annual Wellness Visit  Medicare Wellness Visit      Chris Moreno is a 68 y.o. patient who presents for a Medicare Wellness Visit.    The following portions of the patient's history were reviewed and   updated as appropriate: allergies, current medications, past family history, past medical history, past social history, past surgical history, and problem list.    Compared to one year ago, the patient's physical   health is the same.  Compared to one year ago, the patient's mental   health is the same.    Recent Hospitalizations:  She was not admitted to the hospital during the last year.     Current Medical Providers:  Patient Care Team:  Boyd Abarca MD as PCP - General (Family Medicine)  April Brice MD as Consulting Physician (Hematology and Oncology)  Stephanie Streeter MD as Consulting Physician (Ophthalmology)  Tdod Gomez MD as Consulting Physician (Dermatology)    Outpatient Medications Prior to Visit   Medication Sig Dispense Refill    alendronate (FOSAMAX) 70 MG tablet Take 1 tablet by mouth Every 7 (Seven) Days. Take on empty stomach with full glass of water. For the next 30 min  Do not bend over, eat or lay down. 12 tablet 4    Calcium Citrate (CITRACAL PO) Take  by mouth Daily.      cetirizine (zyrTEC) 10 MG tablet Take 1 tablet by mouth Daily.      ezetimibe (ZETIA) 10 MG tablet TAKE 1 TABLET BY MOUTH EVERY DAY 90 tablet 2    fluticasone (FLONASE) 50 MCG/ACT nasal spray INSTILL TWO SPRAYS INTO EACH NOSTRIL DAILY AS DIRECTED BY PROVIDER 48 mL 3    olopatadine (PATADAY) 0.2 % solution ophthalmic solution ADMINISTER 1 DROP TO BOTH EYES DAILY. 2.5 mL 11    rosuvastatin (CRESTOR) 20 MG tablet TAKE 1 TABLET BY MOUTH EVERY DAY 90 tablet 1    tretinoin (RETIN-A) 0.025 % cream APPLY A SMALL AMOUNT TO AFFECTED AREA EVERY EVENING      Cholecalciferol (VITAMIN D) 2000 UNITS capsule Take 1 capsule by mouth Daily.      vitamin D3 125 MCG (5000 UT) capsule capsule Take  "1 capsule by mouth Daily.       No facility-administered medications prior to visit.     No opioid medication identified on active medication list. I have reviewed chart for other potential  high risk medication/s and harmful drug interactions in the elderly.      Aspirin is not on active medication list.  Aspirin use is not indicated based on review of current medical condition/s. Risk of harm outweighs potential benefits.  .    Patient Active Problem List   Diagnosis    Atopic rhinitis    Pure hypercholesterolemia    Keratoconjunctivitis sicca    Osteopenia of left hip    Varicose veins    Vitamin D deficiency    Health care maintenance    History of breast cancer    History of melanoma    Medicare annual wellness visit, subsequent    Seasonal allergies    FH: breast cancer    Pain of upper abdomen    Localized osteoporosis without current pathological fracture    Multiple thyroid nodules     Advance Care Planning (Click this link to access ACP Navigator)  Advance Directive is not on file.  ACP discussion was held with the patient during this visit. Patient has an advance directive (not in EMR), copy requested.        Objective   Vitals:    07/26/24 1042   BP: 110/63   BP Location: Left arm   Patient Position: Sitting   Cuff Size: Small Adult   Pulse: 74   Resp: 18   SpO2: 96%   Weight: 67.1 kg (148 lb)   Height: 152.4 cm (60\")       Estimated body mass index is 28.9 kg/m² as calculated from the following:    Height as of this encounter: 152.4 cm (60\").    Weight as of this encounter: 67.1 kg (148 lb).    BMI is >= 25 and <30. (Overweight) The following options were offered after discussion;: none (medical contraindication)        Does the patient have evidence of cognitive impairment? No  Lab Results   Component Value Date    CHLPL 185 07/18/2024    TRIG 112 07/18/2024    HDL 49 07/18/2024     (H) 07/18/2024    VLDL 20 07/18/2024                                                                               "                  Health  Risk Assessment    Smoking Status:  Social History     Tobacco Use   Smoking Status Never   Smokeless Tobacco Never     Alcohol Consumption:  Social History     Substance and Sexual Activity   Alcohol Use Yes    Comment: social     Fall Risk Screen:  SIMONEADI Fall Risk Assessment was completed, and patient is at LOW risk for falls.Assessment completed on:2024    Depression Screenin/26/2024    10:43 AM   PHQ-2/PHQ-9 Depression Screening   Little Interest or Pleasure in Doing Things 0-->not at all   Feeling Down, Depressed or Hopeless 0-->not at all   PHQ-9: Brief Depression Severity Measure Score 0     Health Habits and Functional and Cognitive Screenin/24/2024     2:36 PM   Functional & Cognitive Status   Do you have difficulty preparing food and eating? No   Do you have difficulty bathing yourself, getting dressed or grooming yourself? No   Do you have difficulty using the toilet? No   Do you have difficulty moving around from place to place? No   Do you have trouble with steps or getting out of a bed or a chair? No   Current Diet Limited Junk Food   Dental Exam Up to date   Eye Exam Up to date   Exercise (times per week) 2 times per week   Current Exercises Include Aerobics   Do you need help using the phone?  No   Are you deaf or do you have serious difficulty hearing?  No   Do you need help to go to places out of walking distance? No   Do you need help shopping? No   Do you need help preparing meals?  No   Do you need help with housework?  No   Do you need help with laundry? No   Do you need help taking your medications? No   Do you need help managing money? No   Do you ever drive or ride in a car without wearing a seat belt? No   Have you felt unusual stress, anger or loneliness in the last month? No   Who do you live with? Spouse   If you need help, do you have trouble finding someone available to you? No   Have you been bothered in the last four weeks by sexual  problems? No   Do you have difficulty concentrating, remembering or making decisions? No             Age-appropriate Screening Schedule:  Refer to the list below for future screening recommendations based on patient's age, sex and/or medical conditions. Orders for these recommended tests are listed in the plan section. The patient has been provided with a written plan.    Health Maintenance List  Health Maintenance   Topic Date Due    ANNUAL WELLNESS VISIT  10/18/2023    BMI FOLLOWUP  10/18/2023    COVID-19 Vaccine (8 - 2023-24 season) 04/01/2024    MAMMOGRAM  09/06/2024    LIPID PANEL  07/18/2025    DXA SCAN  07/16/2026    COLORECTAL CANCER SCREENING  09/16/2026    TDAP/TD VACCINES (3 - Td or Tdap) 02/01/2032    Pneumococcal Vaccine 65+  Completed    HEPATITIS C SCREENING  Addressed    ZOSTER VACCINE  Addressed    INFLUENZA VACCINE  Discontinued    PAP SMEAR  Discontinued                                                                                                                                                CMS Preventative Services Quick Reference  Risk Factors Identified During Encounter  Immunizations Discussed/Encouraged: Prevnar 20 (Pneumococcal 20-valent conjugate), Shingrix, and COVID19    The above risks/problems have been discussed with the patient.  Pertinent information has been shared with the patient in the After Visit Summary.  An After Visit Summary and PPPS were made available to the patient.    Follow Up:   Next Medicare Wellness visit to be scheduled in 1 year.         Additional E&M Note during same encounter follows:  Patient has additional, significant, and separately identifiable condition(s)/problem(s) that require work above and beyond the Medicare Wellness Visit     Chief Complaint  Medicare Wellness-subsequent    Subjective   HPI  Chris is also being seen today for additional medical problem/s.    This patient is following up today for hyperlipidemia, vitamin D deficiency, osteoporosis  "of right hip, osteopenia of left hip.  No new complaints regarding these conditions.  Taking medications as prescribed below.  She had her labs completed July 18, 2024.  Overall cholesterol looks pretty decent.  Vitamin D is low at 28.0.  She is supposed to be on vitamin D3 2000 units/day.  She is also on the alendronate for the osteoporosis of right hip and osteopenia of left hip.      Current Outpatient Medications:     alendronate (FOSAMAX) 70 MG tablet, Take 1 tablet by mouth Every 7 (Seven) Days. Take on empty stomach with full glass of water. For the next 30 min  Do not bend over, eat or lay down., Disp: 12 tablet, Rfl: 4    Calcium Citrate (CITRACAL PO), Take  by mouth Daily., Disp: , Rfl:     cetirizine (zyrTEC) 10 MG tablet, Take 1 tablet by mouth Daily., Disp: , Rfl:     ezetimibe (ZETIA) 10 MG tablet, TAKE 1 TABLET BY MOUTH EVERY DAY, Disp: 90 tablet, Rfl: 2    fluticasone (FLONASE) 50 MCG/ACT nasal spray, INSTILL TWO SPRAYS INTO EACH NOSTRIL DAILY AS DIRECTED BY PROVIDER, Disp: 48 mL, Rfl: 3    olopatadine (PATADAY) 0.2 % solution ophthalmic solution, ADMINISTER 1 DROP TO BOTH EYES DAILY., Disp: 2.5 mL, Rfl: 11    rosuvastatin (CRESTOR) 20 MG tablet, TAKE 1 TABLET BY MOUTH EVERY DAY, Disp: 90 tablet, Rfl: 1    tretinoin (RETIN-A) 0.025 % cream, APPLY A SMALL AMOUNT TO AFFECTED AREA EVERY EVENING, Disp: , Rfl:     vitamin D3 125 MCG (5000 UT) capsule capsule, Take 1 capsule by mouth Daily., Disp: , Rfl:                     Objective   Vital Signs:  /63 (BP Location: Left arm, Patient Position: Sitting, Cuff Size: Small Adult)   Pulse 74   Resp 18   Ht 152.4 cm (60\")   Wt 67.1 kg (148 lb)   SpO2 96%   BMI 28.90 kg/m²   Physical Exam  Vitals and nursing note reviewed.   Constitutional:       General: She is not in acute distress.     Appearance: Normal appearance.   Cardiovascular:      Rate and Rhythm: Normal rate and regular rhythm.      Heart sounds: Normal heart sounds. No murmur " heard.  Pulmonary:      Effort: Pulmonary effort is normal.      Breath sounds: Normal breath sounds.   Neurological:      Mental Status: She is alert.         The following data was reviewed by: Boyd Abarca MD on 07/26/2024:    Common labs          10/13/2023    08:25 11/8/2023    10:27 11/8/2023    10:35 7/18/2024    09:24   Common Labs   Glucose 95   102  98    BUN 17   17  13    Creatinine 0.71   0.75  0.71    Sodium 143   142  140    Potassium 4.1   4.2  4.2    Chloride 108   106  107    Calcium 9.5   9.2  9.0    Total Protein 6.9    6.7    Albumin 4.7   4.0  4.4    Total Bilirubin 0.8   0.7  0.8    Alkaline Phosphatase 59   57  55    AST (SGOT) 16   18  20    ALT (SGPT) 12   8  15    WBC 5.29  5.27   4.46    Hemoglobin 13.6  13.4   13.2    Hematocrit 39.0  38.4   40.1    Platelets 230  239   225    Total Cholesterol 196    185    Triglycerides 110    112    HDL Cholesterol 47    49    LDL Cholesterol  129    116      Vitamin D level July 18, 2024            Assessment and Plan               Medicare annual wellness visit, subsequent    Pure hypercholesterolemia     Vitamin D deficiency    Localized osteoporosis without current pathological fracture    Osteopenia of left hip    No orders of the defined types were placed in this encounter.    She claims compliance with the vitamin D 2000 IU so I will bump it up to 5000 IU/day and would like her to recheck that with her new primary care provider which will be Dr. Mckeon.  Continue all other medications including alendronate, ezetimibe, rosuvastatin.  Lab/data reviewed as above.              Follow Up   Return if symptoms worsen or fail to improve.  Patient was given instructions and counseling regarding her condition or for health maintenance advice. Please see specific information pulled into the AVS if appropriate.

## 2024-07-28 ENCOUNTER — DOCUMENTATION (OUTPATIENT)
Dept: INTERNAL MEDICINE | Facility: CLINIC | Age: 69
End: 2024-07-28
Payer: MEDICARE

## 2024-07-28 DIAGNOSIS — Z85.3 HISTORY OF BREAST CANCER: ICD-10-CM

## 2024-07-28 DIAGNOSIS — Z12.31 SCREENING MAMMOGRAM FOR BREAST CANCER: Primary | ICD-10-CM

## 2024-07-28 NOTE — PROGRESS NOTES
Left mammogram with sraah beth ordered for breast cancer screening with a history of breast cancer and right mastectomy.

## 2024-09-11 ENCOUNTER — TELEPHONE (OUTPATIENT)
Dept: INTERNAL MEDICINE | Facility: CLINIC | Age: 69
End: 2024-09-11
Payer: MEDICARE

## 2024-09-11 NOTE — TELEPHONE ENCOUNTER
Spoke to Pt, she will call back shortly.    HUB TO SHARE:     Amarjit Maldonado APRN [facility] Clinical Pool  No evidence of malignancy in mammogram.  Recommend screening next year.    Claudio CORDOVA

## 2024-09-11 NOTE — TELEPHONE ENCOUNTER
----- Message from Amarjit Maldonado sent at 9/11/2024 11:10 AM EDT -----  No evidence of malignancy in mammogram.  Recommend screening next year.

## 2024-09-16 ENCOUNTER — OFFICE VISIT (OUTPATIENT)
Dept: INTERNAL MEDICINE | Facility: CLINIC | Age: 69
End: 2024-09-16
Payer: MEDICARE

## 2024-09-16 VITALS
BODY MASS INDEX: 29.64 KG/M2 | HEART RATE: 86 BPM | OXYGEN SATURATION: 96 % | DIASTOLIC BLOOD PRESSURE: 80 MMHG | HEIGHT: 60 IN | SYSTOLIC BLOOD PRESSURE: 122 MMHG | WEIGHT: 151 LBS

## 2024-09-16 DIAGNOSIS — E78.00 PURE HYPERCHOLESTEROLEMIA: Chronic | ICD-10-CM

## 2024-09-16 DIAGNOSIS — R07.81 RIB PAIN ON RIGHT SIDE: ICD-10-CM

## 2024-09-16 DIAGNOSIS — M85.852 OSTEOPENIA OF LEFT HIP: Primary | Chronic | ICD-10-CM

## 2024-09-16 DIAGNOSIS — E55.9 VITAMIN D DEFICIENCY: Chronic | ICD-10-CM

## 2024-09-16 PROCEDURE — 99214 OFFICE O/P EST MOD 30 MIN: CPT | Performed by: STUDENT IN AN ORGANIZED HEALTH CARE EDUCATION/TRAINING PROGRAM

## 2024-09-16 PROCEDURE — 1126F AMNT PAIN NOTED NONE PRSNT: CPT | Performed by: STUDENT IN AN ORGANIZED HEALTH CARE EDUCATION/TRAINING PROGRAM

## 2024-09-20 DIAGNOSIS — E78.00 PURE HYPERCHOLESTEROLEMIA: ICD-10-CM

## 2024-09-20 RX ORDER — ROSUVASTATIN CALCIUM 20 MG/1
20 TABLET, COATED ORAL DAILY
Qty: 90 TABLET | Refills: 1 | Status: SHIPPED | OUTPATIENT
Start: 2024-09-20

## 2024-10-01 DIAGNOSIS — J30.9 ALLERGIC RHINITIS, UNSPECIFIED SEASONALITY, UNSPECIFIED TRIGGER: ICD-10-CM

## 2024-10-01 RX ORDER — FLUTICASONE PROPIONATE 50 UG/1
SPRAY, METERED NASAL
Qty: 48 ML | Refills: 3 | Status: SHIPPED | OUTPATIENT
Start: 2024-10-01

## 2024-10-01 NOTE — TELEPHONE ENCOUNTER
Caller: Chris Moreno    Relationship: Self    Best call back number: [phone]    Requested Prescriptions:   Requested Prescriptions     Pending Prescriptions Disp Refills    fluticasone (FLONASE) 50 MCG/ACT nasal spray [Pharmacy Med Name: FLUTICASONE PROP 50 MCG SPRAY] 48 mL 3     Sig: INSTILL TWO SPRAYS INTO EACH NOSTRIL DAILY AS DIRECTED BY PROVIDER        Pharmacy where request should be sent: [facility]/PHARMACY #[id number] - [place], [place] - [address] AT NEAR [facility] & [facility] - [phone] [facility] [phone] FX     Last office visit with prescribing clinician: 9/16/2024   Last telemedicine visit with prescribing clinician: Visit date not found   Next office visit with prescribing clinician: 3/17/2025     Additional details provided by patient:      Does the patient have less than a 3 day supply:  [x] Yes  [] No    Edith Azul Rep   10/01/24 13:25 EDT

## 2024-10-04 NOTE — PROGRESS NOTES
Problem: Pain  Goal: Verbalizes/displays adequate comfort level or baseline comfort level  10/4/2024 0258 by Guanaco Gupta RN  Outcome: Progressing  10/3/2024 1731 by Bridgett Fontenot RN  Outcome: Progressing     Problem: Safety - Adult  Goal: Free from fall injury  10/4/2024 0258 by Guanaco Gupta RN  Outcome: Progressing  10/3/2024 1731 by Bridgett Fontenot RN  Outcome: Progressing     Problem: Discharge Planning  Goal: Discharge to home or other facility with appropriate resources  10/4/2024 0258 by Guanaco Gupta RN  Outcome: Progressing  10/3/2024 1731 by Bridgett Fontenot RN  Outcome: Progressing     Problem: Skin/Tissue Integrity  Goal: Absence of new skin breakdown  Description: 1.  Monitor for areas of redness and/or skin breakdown  2.  Assess vascular access sites hourly  3.  Every 4-6 hours minimum:  Change oxygen saturation probe site  4.  Every 4-6 hours:  If on nasal continuous positive airway pressure, respiratory therapy assess nares and determine need for appliance change or resting period.  10/4/2024 0258 by Guanaco uGpta RN  Outcome: Progressing  10/3/2024 1731 by Bridgett Fontenot RN  Outcome: Progressing      Subjective   Chris Moreno is a 63 y.o. female.     No recent travel. No known ID contact.       Cough   This is a new problem. The current episode started in the past 7 days. The cough is non-productive. Associated symptoms include postnasal drip, rhinorrhea and a sore throat. Pertinent negatives include no chest pain, chills, ear congestion, ear pain, fever, headaches, heartburn, myalgias, shortness of breath, sweats or wheezing. Treatments tried: tylenol,claritin, flonase, pataday  The treatment provided mild relief. Her past medical history is significant for bronchitis and environmental allergies. There is no history of pneumonia.   Sore Throat    This is a new problem. The current episode started in the past 7 days. Neither side of throat is experiencing more pain than the other. There has been no fever. The pain is at a severity of 4/10. The pain is moderate. Associated symptoms include congestion and coughing. Pertinent negatives include no abdominal pain, diarrhea, ear discharge, ear pain, headaches, plugged ear sensation, shortness of breath, swollen glands, trouble swallowing or vomiting. She has had no exposure to strep or mono. Treatments tried: hot tea         The following portions of the patient's history were reviewed and updated as appropriate: allergies, current medications, past social history and problem list.    Review of Systems   Constitutional: Negative for chills, fatigue and fever.   HENT: Positive for congestion, postnasal drip, rhinorrhea and sore throat. Negative for ear discharge, ear pain, sinus pressure, sinus pain, sneezing and trouble swallowing.    Eyes: Positive for discharge (watery eyes ).   Respiratory: Positive for cough. Negative for shortness of breath and wheezing.    Cardiovascular: Negative for chest pain, palpitations and leg swelling.   Gastrointestinal: Negative for abdominal pain, diarrhea, heartburn, nausea and vomiting.   Musculoskeletal: Negative for myalgias.    Allergic/Immunologic: Positive for environmental allergies.   Neurological: Negative for headaches.       Objective   Physical Exam   Constitutional: She appears well-developed and well-nourished. She is cooperative. She does not have a sickly appearance. She does not appear ill.   HENT:   Head: Normocephalic.   Right Ear: Hearing and external ear normal. No drainage, swelling or tenderness. No mastoid tenderness. Tympanic membrane is bulging. Tympanic membrane is not injected, not scarred and not erythematous. Tympanic membrane mobility is normal. No middle ear effusion. No decreased hearing is noted.   Left Ear: External ear normal. No drainage, swelling or tenderness. No mastoid tenderness. Tympanic membrane is bulging. Tympanic membrane is not injected, not scarred and not erythematous. Tympanic membrane mobility is normal.  No middle ear effusion. No decreased hearing is noted.   Nose: Mucosal edema and rhinorrhea present. No sinus tenderness or nasal deformity. Right sinus exhibits no maxillary sinus tenderness and no frontal sinus tenderness. Left sinus exhibits no maxillary sinus tenderness and no frontal sinus tenderness.   Mouth/Throat: Mucous membranes are normal. Normal dentition. Posterior oropharyngeal erythema present. No tonsillar exudate.   Eyes: Conjunctivae and lids are normal. Pupils are equal, round, and reactive to light. Right eye exhibits no discharge and no exudate. Left eye exhibits no discharge and no exudate.   Neck: Trachea normal and normal range of motion. No edema present. No thyroid mass and no thyromegaly present.   Cardiovascular: Regular rhythm, normal heart sounds and normal pulses.   No murmur heard.  Pulmonary/Chest: Breath sounds normal. No respiratory distress. She has no decreased breath sounds. She has no wheezes. She has no rhonchi. She has no rales.   Dry cough    Lymphadenopathy:        Head (right side): No submental, no submandibular, no tonsillar, no preauricular,  no posterior auricular and no occipital adenopathy present.        Head (left side): No submental, no submandibular, no tonsillar, no preauricular, no posterior auricular and no occipital adenopathy present.   Neurological: She is alert.   Skin: Skin is warm, dry and intact. No cyanosis. Nails show no clubbing.       Assessment/Plan   Chris was seen today for cough and sore throat.    Diagnoses and all orders for this visit:    Sore throat  Comments:  salt water gargles;   Orders:  -     POCT rapid strep A    Bronchitis  Comments:  push fluids   Orders:  -     guaiFENesin (MUCINEX) 600 MG 12 hr tablet; Take 1 tablet by mouth 2 (Two) Times a Day for 7 days.  -     benzonatate (TESSALON) 200 MG capsule; Take 1 capsule by mouth 3 (Three) Times a Day As Needed for Cough.  -     azithromycin (ZITHROMAX Z-DEMETRIS) 250 MG tablet; Take 2 tablets the first day, then 1 tablet daily for 4 days.    Rapid strep negative. She has been advised to return to clinic for any worsening of symptoms.

## 2024-11-05 ENCOUNTER — OFFICE VISIT (OUTPATIENT)
Dept: INTERNAL MEDICINE | Facility: CLINIC | Age: 69
End: 2024-11-05
Payer: MEDICARE

## 2024-11-05 VITALS
TEMPERATURE: 98.3 F | OXYGEN SATURATION: 98 % | DIASTOLIC BLOOD PRESSURE: 62 MMHG | SYSTOLIC BLOOD PRESSURE: 110 MMHG | BODY MASS INDEX: 29.45 KG/M2 | RESPIRATION RATE: 18 BRPM | HEART RATE: 84 BPM | WEIGHT: 150 LBS | HEIGHT: 60 IN

## 2024-11-05 DIAGNOSIS — J06.9 VIRAL URI WITH COUGH: Primary | ICD-10-CM

## 2024-11-05 DIAGNOSIS — J30.9 ALLERGIC RHINITIS, UNSPECIFIED SEASONALITY, UNSPECIFIED TRIGGER: ICD-10-CM

## 2024-11-05 PROCEDURE — 1159F MED LIST DOCD IN RCRD: CPT | Performed by: STUDENT IN AN ORGANIZED HEALTH CARE EDUCATION/TRAINING PROGRAM

## 2024-11-05 PROCEDURE — 1126F AMNT PAIN NOTED NONE PRSNT: CPT | Performed by: STUDENT IN AN ORGANIZED HEALTH CARE EDUCATION/TRAINING PROGRAM

## 2024-11-05 PROCEDURE — 99213 OFFICE O/P EST LOW 20 MIN: CPT | Performed by: STUDENT IN AN ORGANIZED HEALTH CARE EDUCATION/TRAINING PROGRAM

## 2024-11-05 PROCEDURE — 1160F RVW MEDS BY RX/DR IN RCRD: CPT | Performed by: STUDENT IN AN ORGANIZED HEALTH CARE EDUCATION/TRAINING PROGRAM

## 2024-11-05 NOTE — PROGRESS NOTES
"Chief Complaint  Cough (Congestion x10 days, went to [facility]. Occasional headache. Only took tylenol./Home test Covid negative 11/03)    Subjective        Chris Moreno presents to [facility]  History of Present Illness  This is a 69-year-old female being seen for cough and congestion.  Symptoms began about 10 days ago while on a trip in [facility].  She has noticed runny nose, mild headache, nonproductive cough, runny eyes.  Denies fever, shortness of breath, myalgia, sick contacts.  Took an at home COVID test 2 days ago that was negative.  Taken Tylenol, cetirizine, Flonase for symptoms.  She has been able to take p.o. and has been drinking lots of fluids.    Answers submitted by the patient for this visit:  Primary Reason for Visit (Submitted on 11/5/2024)  What is the primary reason for your visit?: Cough      Objective   Vital Signs:  /62 (BP Location: Left arm, Patient Position: Sitting, Cuff Size: Adult)   Pulse 84   Temp 98.3 °F (36.8 °C) (Oral)   Resp 18   Ht 152.4 cm (60\")   Wt 68 kg (150 lb)   SpO2 98%   BMI 29.29 kg/m²   Estimated body mass index is 29.29 kg/m² as calculated from the following:    Height as of this encounter: 152.4 cm (60\").    Weight as of this encounter: 68 kg (150 lb).          Physical Exam  Vitals and nursing note reviewed.   Constitutional:       General: She is not in acute distress.     Appearance: Normal appearance. She is not ill-appearing or toxic-appearing.   HENT:      Nose: Congestion present.   Cardiovascular:      Rate and Rhythm: Normal rate and regular rhythm.   Pulmonary:      Effort: Pulmonary effort is normal.      Breath sounds: Normal breath sounds. No wheezing or rales.   Skin:     General: Skin is warm and dry.   Neurological:      Mental Status: She is alert.   Psychiatric:         Mood and Affect: Mood normal.         Thought Content: Thought content normal.         Judgment: Judgment normal.        Result Review :          "       Assessment and Plan   Diagnoses and all orders for this visit:    1. Viral URI with cough (Primary)    2. Allergic rhinitis, unspecified seasonality, unspecified trigger      Advised patient may be reasonable to change from cetirizine to other allergy medicine such as Claritin or Allegra which she has trialed in the past.  Advised her to reach out should she develop new symptoms such as fever, productive cough, shortness of breath.  She does not feel as though she needed anything for her cough at this time.  Let her know viral illnesses may take several weeks for cough to fully resolve.  Patient felt comfortable with plan of care.  Discussed should hold off on flu vaccine for couple weeks until she is feeling better.         Follow Up   Return in about 4 months (around 3/5/2025) for Next scheduled follow up.  Patient was given instructions and counseling regarding her condition or for health maintenance advice. Please see specific information pulled into the AVS if appropriate.

## 2024-11-18 ENCOUNTER — OFFICE VISIT (OUTPATIENT)
Dept: ONCOLOGY | Facility: CLINIC | Age: 69
End: 2024-11-18
Payer: MEDICARE

## 2024-11-18 VITALS
TEMPERATURE: 98.1 F | BODY MASS INDEX: 29.21 KG/M2 | HEART RATE: 84 BPM | SYSTOLIC BLOOD PRESSURE: 124 MMHG | OXYGEN SATURATION: 95 % | DIASTOLIC BLOOD PRESSURE: 81 MMHG | RESPIRATION RATE: 16 BRPM | WEIGHT: 148.8 LBS | HEIGHT: 60 IN

## 2024-11-18 DIAGNOSIS — Z85.820 HISTORY OF MELANOMA: ICD-10-CM

## 2024-11-18 DIAGNOSIS — E78.00 PURE HYPERCHOLESTEROLEMIA: ICD-10-CM

## 2024-11-18 DIAGNOSIS — D05.11 DUCTAL CARCINOMA IN SITU (DCIS) OF RIGHT BREAST: Primary | ICD-10-CM

## 2024-11-18 PROCEDURE — 1160F RVW MEDS BY RX/DR IN RCRD: CPT | Performed by: INTERNAL MEDICINE

## 2024-11-18 PROCEDURE — G2211 COMPLEX E/M VISIT ADD ON: HCPCS | Performed by: INTERNAL MEDICINE

## 2024-11-18 PROCEDURE — 1126F AMNT PAIN NOTED NONE PRSNT: CPT | Performed by: INTERNAL MEDICINE

## 2024-11-18 PROCEDURE — 1159F MED LIST DOCD IN RCRD: CPT | Performed by: INTERNAL MEDICINE

## 2024-11-18 PROCEDURE — 99214 OFFICE O/P EST MOD 30 MIN: CPT | Performed by: INTERNAL MEDICINE

## 2024-11-18 RX ORDER — EZETIMIBE 10 MG/1
10 TABLET ORAL DAILY
Qty: 90 TABLET | Refills: 2 | Status: SHIPPED | OUTPATIENT
Start: 2024-11-18

## 2024-11-18 NOTE — TELEPHONE ENCOUNTER
Caller: Chris Moreno    Relationship: Self    Best call back number: [phone]     Requested Prescriptions:   Requested Prescriptions     Pending Prescriptions Disp Refills    ezetimibe (ZETIA) 10 MG tablet 90 tablet 2     Sig: Take 1 tablet by mouth Daily.        Pharmacy where request should be sent: [facility]/PHARMACY #[id number] - [facility] [address] AT [address] & [facility] [phone] [facility] [phone] FX     Last office visit with prescribing clinician: 11/5/2024   Last telemedicine visit with prescribing clinician: Visit date not found   Next office visit with prescribing clinician: 3/17/2025     Additional details provided by patient: THIS IS THE FIRST TIME REFILL SINCE SWITCHING TO DR JAMES AND PATIENT IS NEEDING NEW PRESCRIPTION SENT TO PHARMACY.     Does the patient have less than a 3 day supply:  [] Yes  [x] No    Would you like a call back once the refill request has been completed: [] Yes [x] No    If the office needs to give you a call back, can they leave a voicemail: [x] Yes [] No    Edith Frances Rep   11/18/24 11:30 EST

## 2024-11-18 NOTE — PROGRESS NOTES
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   Negative genetic testing for BRCA1 and BRCA2 in July 2006.   Osteopenia  HISTORY OF PRESENT ILLNESS:   The patient is a 69 y.o. female with the above-noted history of DCIS and microinvasion in the right breast in 2006. She underwent mastectomy and TRAM flap reconstruction. She was started on Evista as a breast cancer prevention and  elected to remain on Evista after completing 5 years of therapy due to the fact that she also has significant osteopenia.  She eventually was taken off Evista by her primary care physician and switched to Fosamax due to further decline in her bone density in 2020.      INTERVAL HISTORY:  Ms. Moreno returns today for follow-up.  She is new to me today.  She denies any new right chest wall lesions or left breast masses.  She saw her dermatologist and cyst was removed from the left lateral aspect of the breast.  This was benign.  She is up-to-date with the mammograms of the left breast and most recent one from 9/9/2024 was benign.  She had labs performed in July 2024 including CBC and CMP which were normal.  She denies any new aches or pains anywhere, no decreased appetite or weight loss, no nausea vomiting headaches or blurry vision.  Overall doing well.    History of Present Illness      Past Medical History, Past Surgical History, Social History, Family History have been reviewed and are without significant changes except as mentioned.    Review of Systems   Constitutional:  Negative for activity change, appetite change, fatigue, fever and unexpected weight change.   HENT:  Negative for hearing loss, nosebleeds, trouble swallowing and voice change.    Eyes:  Negative for visual disturbance.   Respiratory:  Negative for cough, shortness of breath and wheezing.    Cardiovascular:  Negative for chest pain and palpitations.  "  Gastrointestinal:  Negative for abdominal pain, diarrhea, nausea and vomiting.   Genitourinary:  Negative for difficulty urinating, frequency, hematuria and urgency.   Musculoskeletal:  Negative for back pain and neck pain.   Skin:  Negative for rash.   Neurological:  Negative for dizziness, seizures, syncope and headaches.   Hematological:  Negative for adenopathy. Does not bruise/bleed easily.   Psychiatric/Behavioral:  Negative for behavioral problems. The patient is not nervous/anxious.      Review of systems as mentioned HPI otherwise negative    Medications:  The current medication list was reviewed in the EMR    ALLERGIES:  No Known Allergies    Objective      Vitals:    11/18/24 1250   BP: 124/81   Pulse: 84   Resp: 16   Temp: 98.1 °F (36.7 °C)   TempSrc: Oral   SpO2: 95%   Weight: 67.5 kg (148 lb 12.8 oz)   Height: 152.4 cm (60\")   PainSc: 0-No pain           11/18/2024    12:55 PM   Current Status   ECOG score 0       Physical Exam   Constitutional: She is oriented to person, place, and time. She appears well-developed. No distress.   HENT:   Head: Normocephalic.   Eyes: Pupils are equal, round, and reactive to light.   Neck: No JVD present. No thyromegaly present.   Cardiovascular: Normal rate and regular rhythm. Exam reveals no gallop and no friction rub.   No murmur heard.  Pulmonary/Chest: Effort normal and breath sounds normal. She has no wheezes. She has no rales. Left breast exhibits no mass, no nipple discharge and no skin change.   Right TRAM reconstruction.   Abdominal: Soft. Bowel sounds are normal. She exhibits no distension and no mass. There is no abdominal tenderness.   Musculoskeletal: No deformity.   Neurological: She is alert and oriented to person, place, and time. She has normal reflexes. No cranial nerve deficit.   Skin: Skin is dry. No rash noted.   Psychiatric: Judgment normal.     I have reexamined the patient and the results are consistent with the previously documented exam. " April Brice MD      RECENT LABS:  Hematology WBC   Date Value Ref Range Status   07/18/2024 4.46 3.40 - 10.80 10*3/mm3 Final     RBC   Date Value Ref Range Status   07/18/2024 4.37 3.77 - 5.28 10*6/mm3 Final     Hemoglobin   Date Value Ref Range Status   07/18/2024 13.2 12.0 - 15.9 g/dL Final   11/08/2023 13.4 12.0 - 15.9 g/dL Final     Hematocrit   Date Value Ref Range Status   07/18/2024 40.1 34.0 - 46.6 % Final   11/08/2023 38.4 34.0 - 46.6 % Final     Platelets   Date Value Ref Range Status   07/18/2024 225 140 - 450 10*3/mm3 Final   11/08/2023 239 140 - 450 10*3/mm3 Final          MAMMOGRAM 9/5/2023          IMPRESSION:   There is no mammographic evidence of malignancy.     Screening Mammogram in 1 year is recommended.     BI-RADS Category 1:   Negative       Assessment & Plan   1.  DCIS with microinvasion of the right breast status post mastectomy and TRAM flap reconstruction.  She also received therapy with Evista as breast cancer prevention strategy.  After 5 years of Evista she elected to remain on the medication due to her osteopenia.  As noted above, this has since been discontinued and she is now on weekly Fosamax prescribed by her primary care physician.  She is currently taking a break from the Fosamax with plans to resume again.  She continues without any evidence of recurrent disease.  2.  Melanoma from the leg excised.  She continues to follow-up with her dermatologist regularly.  Recent excision of left breast skin lesion which was benign.  3.  Basal cell carcinoma of the left nostril.  She underwent successful Mohs microsurgery around this time last year.   4.strong family history of breast cancer in her mother, and 2 sisters with one of the sisters having had metastatic breast cancer.  Genetic testing with [employer]itae 9 gene stat panel performed in February 2022 negative.    Plan  1.  She will continue annual surveillance.  2.  Her next mammogram  will be due in September 2025        Patient is  new to me and all issues are new to me.  30 minutes total spent on the encounter on the same day.  Being seen for breast cancer which is a longtime issue.              11/18/2024      CC:

## 2024-12-02 ENCOUNTER — TELEPHONE (OUTPATIENT)
Dept: INTERNAL MEDICINE | Facility: CLINIC | Age: 69
End: 2024-12-02
Payer: MEDICARE

## 2024-12-02 NOTE — TELEPHONE ENCOUNTER
Caller: Chris Moreno    Relationship: Self    Best call back number: [phone]         What are your current symptoms: COUGH.      How long have you been experiencing symptoms: OVER A MONTH    Have you had these symptoms before:    [x] Yes  [] No    Have you been treated for these symptoms before:   [] Yes  [x] No    If a prescription is needed, what is your preferred pharmacy and phone number: [facility] #[id number] - [place], [place] - [address] AT [address]AR [address] [address] [facility] [phone] [facility] [phone]      Additional notes:  PATIENT WAS IN ON 11/5 AND COUGH HAS NOT GONE AWAY.  ASKING FOR COUGH MEDICATION TO CONTROL COUGH.  PLEASE CALL CALL TO ADVISE IF MEDICATION CAN'T BE CALLED IN.

## 2024-12-03 RX ORDER — GUAIFENESIN 600 MG/1
1200 TABLET, EXTENDED RELEASE ORAL 2 TIMES DAILY
Qty: 28 TABLET | Refills: 0 | Status: SHIPPED | OUTPATIENT
Start: 2024-12-03 | End: 2024-12-10

## 2024-12-03 RX ORDER — BENZONATATE 100 MG/1
100 CAPSULE ORAL 3 TIMES DAILY PRN
Qty: 21 CAPSULE | Refills: 0 | Status: SHIPPED | OUTPATIENT
Start: 2024-12-03 | End: 2024-12-10

## 2025-03-17 ENCOUNTER — OFFICE VISIT (OUTPATIENT)
Dept: INTERNAL MEDICINE | Facility: CLINIC | Age: 70
End: 2025-03-17
Payer: MEDICARE

## 2025-03-17 VITALS
BODY MASS INDEX: 29.02 KG/M2 | RESPIRATION RATE: 20 BRPM | HEIGHT: 60 IN | OXYGEN SATURATION: 98 % | DIASTOLIC BLOOD PRESSURE: 68 MMHG | HEART RATE: 80 BPM | WEIGHT: 147.8 LBS | SYSTOLIC BLOOD PRESSURE: 108 MMHG

## 2025-03-17 DIAGNOSIS — Z01.419 WELL WOMAN EXAM: ICD-10-CM

## 2025-03-17 DIAGNOSIS — E04.1 LEFT THYROID NODULE: ICD-10-CM

## 2025-03-17 DIAGNOSIS — E55.9 VITAMIN D DEFICIENCY: Chronic | ICD-10-CM

## 2025-03-17 DIAGNOSIS — E78.00 PURE HYPERCHOLESTEROLEMIA: ICD-10-CM

## 2025-03-17 DIAGNOSIS — M85.852 OSTEOPENIA OF LEFT HIP: Chronic | ICD-10-CM

## 2025-03-17 DIAGNOSIS — E78.00 PURE HYPERCHOLESTEROLEMIA: Primary | Chronic | ICD-10-CM

## 2025-03-17 RX ORDER — ROSUVASTATIN CALCIUM 20 MG/1
20 TABLET, COATED ORAL DAILY
Qty: 90 TABLET | Refills: 3 | Status: SHIPPED | OUTPATIENT
Start: 2025-03-17

## 2025-03-17 NOTE — TELEPHONE ENCOUNTER
Rx Refill Note  Requested Prescriptions     Pending Prescriptions Disp Refills    rosuvastatin (CRESTOR) 20 MG tablet 90 tablet 1     Sig: Take 1 tablet by mouth Daily.      Last office visit with prescribing clinician: 3/17/2025   Last telemedicine visit with prescribing clinician: Visit date not found   Next office visit with prescribing clinician: Visit date not found                         Would you like a call back once the refill request has been completed: [] Yes [] No    If the office needs to give you a call back, can they leave a voicemail: [] Yes [] No    Claudio Sullivan MA  03/17/25, 08:02 EDT

## 2025-03-17 NOTE — PROGRESS NOTES
"Chief Complaint  Hyperlipidemia    Subjective        Chris Moreno presents to [facility] PRIMARY [facility]ARE  History of Present Illness  This 69-year-old female here for follow-up regarding vitamin D deficiency, hypercholesterolemia.    Hyperlipidemia: Taking 20 mg rosuvastatin without side effect  Vitamin D deficiency: Taking 5000 units daily  We discussed the results of recent blood work.  We also reviewed that she had a thyroid ultrasound about 1 year ago that recommended 1 year follow-up.  She is also interested in referral for women's health regarding well woman exam.    Objective   Vital Signs:  /68 (BP Location: Left arm, Patient Position: Sitting, Cuff Size: Adult)   Pulse 80   Resp 20   Ht 152.4 cm (60\")   Wt 67 kg (147 lb 12.8 oz)   SpO2 98%   BMI 28.87 kg/m²   Estimated body mass index is 28.87 kg/m² as calculated from the following:    Height as of this encounter: 152.4 cm (60\").    Weight as of this encounter: 67 kg (147 lb 12.8 oz).          Physical Exam  Vitals reviewed.   Constitutional:       Appearance: Normal appearance. She is not ill-appearing.   Pulmonary:      Effort: No respiratory distress.   Neurological:      Mental Status: She is alert and oriented to person, place, and time.   Psychiatric:         Mood and Affect: Mood normal.         Thought Content: Thought content normal.         Judgment: Judgment normal.        Result Review :                Assessment and Plan   Diagnoses and all orders for this visit:    1. Pure hypercholesterolemia (Primary)    2. Vitamin D deficiency    3. Osteopenia of left hip    4. Left thyroid nodule  -     US Thyroid    5. Well woman exam  -     Ambulatory Referral to Gynecology    She is agreeable to pursue repeat thyroid ultrasound for surveillance.         Follow Up   Return in about 6 months (around 9/17/2025) for Medicare Wellness.  Patient was given instructions and counseling regarding her condition or for health maintenance " advice. Please see specific information pulled into the AVS if appropriate.

## 2025-05-12 ENCOUNTER — HOSPITAL ENCOUNTER (OUTPATIENT)
Dept: ULTRASOUND IMAGING | Facility: HOSPITAL | Age: 70
Discharge: HOME | End: 2025-05-12
Admitting: STUDENT IN AN ORGANIZED HEALTH CARE EDUCATION/TRAINING PROGRAM
Payer: MEDICARE

## 2025-05-12 PROCEDURE — 76536 US EXAM OF HEAD AND NECK: CPT

## 2025-05-13 ENCOUNTER — RESULTS FOLLOW-UP (OUTPATIENT)
Dept: INTERNAL MEDICINE | Facility: CLINIC | Age: 70
End: 2025-05-13
Payer: MEDICARE

## 2025-05-13 DIAGNOSIS — E04.1 RIGHT THYROID NODULE: Primary | ICD-10-CM

## 2025-05-13 NOTE — TELEPHONE ENCOUNTER
Lvm for patient to call back for lab results     Hub okay to relay     Repeat thyroid ultrasound indicates the need for fine-needle aspiration of the right thyroid nodule.  I have placed a referral for endocrinology regarding this thank you  Dr. Mckeon

## 2025-06-02 ENCOUNTER — APPOINTMENT (OUTPATIENT)
Dept: CT IMAGING | Facility: HOSPITAL | Age: 70
End: 2025-06-02
Payer: MEDICARE

## 2025-06-02 ENCOUNTER — HOSPITAL ENCOUNTER (EMERGENCY)
Facility: HOSPITAL | Age: 70
Discharge: HOME | End: 2025-06-02
Attending: EMERGENCY MEDICINE | Admitting: EMERGENCY MEDICINE
Payer: MEDICARE

## 2025-06-02 VITALS
OXYGEN SATURATION: 96 % | DIASTOLIC BLOOD PRESSURE: 84 MMHG | SYSTOLIC BLOOD PRESSURE: 115 MMHG | HEART RATE: 87 BPM | RESPIRATION RATE: 18 BRPM | TEMPERATURE: 97.9 F

## 2025-06-02 DIAGNOSIS — S00.83XA CONTUSION OF FACE, INITIAL ENCOUNTER: ICD-10-CM

## 2025-06-02 DIAGNOSIS — S09.90XA CLOSED HEAD INJURY, INITIAL ENCOUNTER: ICD-10-CM
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
## 2025-06-02 PROCEDURE — 70486 CT MAXILLOFACIAL W/O DYE: CPT

## 2025-06-02 PROCEDURE — 99284 EMERGENCY DEPT VISIT MOD MDM: CPT

## 2025-06-02 PROCEDURE — 70450 CT HEAD/BRAIN W/O DYE: CPT

## 2025-06-02 NOTE — ED PROVIDER NOTES
EMERGENCY DEPARTMENT ENCOUNTER    Room Number:  41/41  PCP: lAecia Mckeon MD  Historian: Patient      HPI:  Chief Complaint: Fall with head trauma  A complete HPI/ROS/PMH/PSH/SH/FH are unobtainable due to: None  Context: Chris Moreno is a 69 y.o. female who presents to the ED c/o sudden onset head/facial swelling that occurred status post fall 3 nights ago.  She states that she missed the last step while going down the stairs causing her to fall striking her head on the ground.  She reports fairly immediately swelling of her right eyelid that has significantly improved post intermittent ice treatment.  She currently reports that the pain is mild to moderate in intensity and only really to the touch.  She denies headache, nausea/vomiting, dizziness, neck pain, visual changes, or extremity pain.  She is not on any blood thinning medication.            PAST MEDICAL HISTORY  Active Ambulatory Problems     Diagnosis Date Noted    Atopic rhinitis 01/28/2016    Pure hypercholesterolemia 01/28/2016    Keratoconjunctivitis sicca 01/28/2016    Osteopenia of left hip 01/28/2016    Varicose veins 01/28/2016    Vitamin D deficiency 01/28/2016    Health care maintenance 07/27/2016    History of breast cancer     History of melanoma 10/17/2017    Medicare annual wellness visit, subsequent 09/24/2021    Seasonal allergies 09/24/2021    FH: breast cancer 02/15/2022    Pain of upper abdomen 02/15/2022    Localized osteoporosis without current pathological fracture 07/19/2022    Multiple thyroid nodules 02/29/2024     Resolved Ambulatory Problems     Diagnosis Date Noted    Bronchitis, acute 05/31/2016    Subclinical hypothyroidism     Ductal carcinoma in situ (DCIS) of right breast 09/12/2016    Impaired fasting blood sugar 05/09/2019     Past Medical History:   Diagnosis Date    Allergic 1985    Basal cell carcinoma of nose     Breast cancer     Cataract 2018    Dry eye syndrome     Fracture, humerus     HLD (hyperlipidemia)      Hypercholesterolemia     Hyperglycemia     Melanoma in situ     Nulliparity     Osteopenia     Visual impairment 2023         PAST SURGICAL HISTORY  Past Surgical History:   Procedure Laterality Date    BREAST SURGERY Right     mastectomy with TRAM flap reconstruction    COLONOSCOPY      2006    COLONOSCOPY N/A 09/16/2016    Procedure: COLONOSCOPY to cecum;  Surgeon: Dale Ann Jr., MD;  Location: [facility] ENDOSCOPY;  Service:     COSMETIC SURGERY  2006    Reconstructive TRAM flap after mastectomy    FRACTURE SURGERY      HUMERUS FRACTURE SURGERY      11/2008,  for the L lateral humeral epicondilar fracture    MALIGNANT SKIN LESION EXCISION Left 1996    calf melanoma    MASTECTOMY      03/06 R ductal carcinoma in situ, multifocal,  R total mastectomy    MOLE REMOVAL      TONSILLECTOMY      VEIN LIGATION           FAMILY HISTORY  Family History   Problem Relation Age of Onset    Diabetes Mother         Did not require insulin or dialysis    Breast cancer Mother     Melanoma Mother     Cancer Mother         malignant melanoma; breast cancer    Lung cancer Father     Cancer Father         lung and brain cancer    Hypertension Sister     Breast cancer Sister     Cancer Sister         breast cancer    Lung cancer Maternal Grandmother     Diabetes Maternal Grandmother     Cancer Maternal Grandmother         stomach cancer ?    Glaucoma Paternal Grandmother     Diabetes Paternal Grandmother     Vision loss Paternal Grandmother         Glaucoma    Lung cancer Maternal Grandfather     Cancer Maternal Grandfather         cancer ? brain?    Breast cancer Sister     Cancer Sister         breast cancer    Hypertension Sister     Melanoma Maternal Uncle     Cancer Maternal Uncle         skin cancer         SOCIAL HISTORY  Social History     Socioeconomic History    Marital status:      Spouse name: Stephanie   Tobacco Use    Smoking status: Never    Smokeless tobacco: Never    Tobacco comments:     never  smoked   Vaping Use    Vaping status: Never Used   Substance and Sexual Activity    Alcohol use: Yes     Comment: I have  1 glass of wine or 1 beer a month.    Drug use: Never    Sexual activity: Not Currently     Partners: Male     Birth control/protection: None         ALLERGIES  Patient has no known allergies.        REVIEW OF SYSTEMS  Review of Systems   Constitutional:  Negative for fever.   HENT:  Positive for facial swelling. Negative for sore throat.         Facial discomfort/bruising   Eyes: Negative.    Respiratory:  Negative for cough and shortness of breath.    Cardiovascular:  Negative for chest pain.   Gastrointestinal:  Negative for abdominal pain, diarrhea and vomiting.   Genitourinary:  Negative for dysuria.   Musculoskeletal:  Negative for neck pain.   Skin:  Negative for rash.   Allergic/Immunologic: Negative.    Neurological:  Negative for weakness, numbness and headaches.   Hematological: Negative.    Psychiatric/Behavioral: Negative.     All other systems reviewed and are negative.         PHYSICAL EXAM  ED Triage Vitals   Temp Heart Rate Resp BP SpO2   06/02/25 1512 06/02/25 1512 06/02/25 1512 06/02/25 1515 06/02/25 1512   97.9 °F (36.6 °C) 87 18 137/86 96 %      Temp src Heart Rate Source Patient Position BP Location FiO2 (%)   06/02/25 1512 06/02/25 1512 -- -- --   Tympanic Monitor          Physical Exam  Constitutional:       General: She is not in acute distress.     Appearance: Normal appearance. She is not ill-appearing or toxic-appearing.   HENT:      Head: Normocephalic.      Comments: Right-sided periorbital contusion, EOMI  Eyes:      Extraocular Movements: Extraocular movements intact.      Pupils: Pupils are equal, round, and reactive to light.   Cardiovascular:      Rate and Rhythm: Normal rate and regular rhythm.      Heart sounds: No murmur heard.     No friction rub. No gallop.   Pulmonary:      Effort: Pulmonary effort is normal.      Breath sounds: Normal breath sounds.    Abdominal:      General: Abdomen is flat. There is no distension.      Palpations: Abdomen is soft.      Tenderness: There is no abdominal tenderness.   Musculoskeletal:         General: No swelling or tenderness. Normal range of motion.      Cervical back: Normal range of motion and neck supple.   Skin:     General: Skin is warm and dry.   Neurological:      General: No focal deficit present.      Mental Status: She is alert and oriented to person, place, and time.      Sensory: No sensory deficit.      Motor: No weakness.   Psychiatric:         Mood and Affect: Mood normal.         Behavior: Behavior normal.           Vital signs and nursing notes reviewed.          LAB RESULTS  No results found for this or any previous visit (from the past 24 hours).    Ordered the above labs and reviewed the results.        RADIOLOGY  CT Head Without Contrast  CT Head Without Contrast, CT Facial Bones Without Contrast  Result Date: 6/2/2025  CT HEAD AND FACIAL BONES WITHOUT CONTRAST  HISTORY: Fall.  COMPARISON: None.  CT HEAD WITHOUT CONTRAST: The brain and ventricles are symmetrical. A focal area of increased attenuation is present involving the supraorbital region on the right consistent with a focal hematoma. This measures approximately 11 mm in transverse dimension. There is no evidence of a calvarial fracture, of intracranial hemorrhage or of a focal area of decreased attenuation to suggest acute infarction or contusion.  CT FACIAL BONES WITHOUT CONTRAST: The frontal, ethmoid, sphenoid and maxillary sinuses are clear. The supraorbital hematoma on the right is again noted. There is no evidence of fracture.   Radiation dose reduction techniques were utilized, including automated exposure control and exposure modulation based on body size.   This report was finalized on 6/2/2025 4:30 PM by Dr. Luis Jones M.D on Workstation: [id number]        Ordered the above noted radiological studies. Reviewed by me in PACS.             PROCEDURES  Procedures            MEDICATIONS GIVEN IN ER  Medications - No data to display                MEDICAL DECISION MAKING, PROGRESS, and CONSULTS    All labs have been independently reviewed by me.  All radiology studies have been reviewed by me and I have also reviewed the radiology report.   EKGs independently viewed and interpreted by me.  Discussion below represents my analysis of pertinent findings related to patient's condition, differential diagnosis, treatment plan and final disposition.      Additional sources:  - Discussed/ obtained information from independent historians: History obtained from the patient herself at bedside.    - External (non-ED) record review: Upon medical records review, the patient was last seen and evaluated the outpatient office of primary care on 3/17/2025 for evaluation of her known high cholesterol as well as osteopenia.    - Chronic or social conditions impacting care: None reported    - Shared decision making: Discharge decision based on shared conversations have between myself as well as the patient at bedside.      Orders placed during this visit:  Orders Placed This Encounter   Procedures    CT Head Without Contrast    CT Facial Bones Without Contrast           Differential diagnosis includes but is not limited to:    Closed head injury, skull fracture, intracranial hemorrhage, intracranial mass effect, orbital fracture, or facial contusion      Independent interpretation of labs, radiology studies, and discussions with consultants:    Head CT independently interpreted by myself with my interpretation showing no skull fracture nor hemorrhage or mass effect.      ED Course as of 06/02/25 1659   Mon Jun 02, 2025   1657 On reevaluation, the patient is resting comfortably without further complaints.  I informed her that her CT head and facial bones are negative for acute traumatic abnormality.  We did discuss ice and heat therapy as well as ibuprofen as  needed/directed.  She is stable for discharge at this point and all questions answered. [BM]      ED Course User Index  [BM] Parag Ott MD           DIAGNOSIS  Final diagnoses:   Fall down stairs, initial encounter   Closed head injury, initial encounter   Contusion of face, initial encounter         DISPOSITION  DISCHARGE    Patient discharged in stable condition.    Reviewed implications of results, diagnosis, meds, responsibility to follow up, warning signs and symptoms of possible worsening, potential complications and reasons to return to ER.    Patient/Family voiced understanding of above instructions.    Discussed plan for discharge, as there is no emergent indication for admission. Patient referred to primary care provider for BP management due to today's BP. Pt/family is agreeable and understands need for follow up and repeat testing.  Pt is aware that discharge does not mean that nothing is wrong but it indicates no emergency is present that requires admission and they must continue care with follow-up as given below or physician of their choice.     FOLLOW-UP  Alecia Mckeon MD  [address] [address]  [phone]    Schedule an appointment as soon as possible for a visit            Medication List      No changes were made to your prescriptions during this visit.                   Latest Documented Vital Signs:  As of 16:59 EDT  BP- 137/86 HR- 87 Temp- 97.9 °F (36.6 °C) (Tympanic) O2 sat- 96%              --    Please note that portions of this were completed with a voice recognition program.       Note Disclaimer: At [facility], we believe that sharing information builds trust and better relationships. You are receiving this note because you are receiving care at [facility] or recently visited. It is possible you will see health information before a provider has talked with you about it. This kind of information can be easy to misunderstand. To help  you fully understand what it means for your health, we urge you to discuss this note with your provider.             Parag Ott MD  06/02/25 [id number]

## 2025-06-04 ENCOUNTER — OFFICE VISIT (OUTPATIENT)
Dept: INTERNAL MEDICINE | Facility: CLINIC | Age: 70
End: 2025-06-04
Payer: MEDICARE

## 2025-06-04 VITALS
WEIGHT: 145.6 LBS | HEART RATE: 73 BPM | RESPIRATION RATE: 18 BRPM | DIASTOLIC BLOOD PRESSURE: 74 MMHG | SYSTOLIC BLOOD PRESSURE: 126 MMHG | OXYGEN SATURATION: 97 % | HEIGHT: 60 IN | BODY MASS INDEX: 28.58 KG/M2

## 2025-06-04 DIAGNOSIS — T14.8XXA HEMATOMA: ICD-10-CM

## 2025-06-04 DIAGNOSIS — Z09 HOSPITAL DISCHARGE FOLLOW-UP: Primary | ICD-10-CM
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
## 2025-06-04 RX ORDER — KETOCONAZOLE 20 MG/G
CREAM TOPICAL
COMMUNITY
Start: 2025-03-19

## 2025-06-04 NOTE — PROGRESS NOTES
"Chief Complaint  Fall (Follow up and go over CT )    Subjective        Chris Moreno presents to [facility] PRIMARY CARE  History of Present Illness  She is here for follow up after evaluation in the emergency department on June 2 regarding a fall that occurred at home.  CT head and facial bones without contrast identified a supraorbital hematoma on the right side.  She had actually been seen by her eye doctor prior to the emergency department who recommended further evaluation with a CT in the emergency department.  She is overall doing much better still has some bruising and swelling on the right side of her face.  She has not had further falls since this incident.  She denies any changes in her vision and reports the swelling on her eye has gotten much better.    Objective   Vital Signs:  /74 (BP Location: Left arm, Patient Position: Sitting, Cuff Size: Adult)   Pulse 73   Resp 18   Ht 152.4 cm (60\")   Wt 66 kg (145 lb 9.6 oz)   SpO2 97%   BMI 28.44 kg/m²   Estimated body mass index is 28.44 kg/m² as calculated from the following:    Height as of this encounter: 152.4 cm (60\").    Weight as of this encounter: 66 kg (145 lb 9.6 oz).          Physical Exam  Vitals reviewed.   Constitutional:       General: She is not in acute distress.     Appearance: Normal appearance. She is normal weight.   Cardiovascular:      Rate and Rhythm: Normal rate.   Skin:     Comments: Ecchymosis periorbitally along with mild amount of swelling.  There is no drainage or evidence of infection.   Neurological:      Mental Status: She is alert.        Result Review :                Assessment and Plan   Diagnoses and all orders for this visit:    1. Hospital discharge follow-up (Primary)    2. Fall, subsequent encounter    3. Hematoma             Follow Up   Return in about 4 months (around 9/22/2025) for Medicare Wellness.  Patient was given instructions and counseling regarding her condition or for health " maintenance advice. Please see specific information pulled into the AVS if appropriate.

## 2025-06-11 ENCOUNTER — OFFICE VISIT (OUTPATIENT)
Dept: ENDOCRINOLOGY | Age: 70
End: 2025-06-11
Payer: MEDICARE

## 2025-06-11 VITALS
TEMPERATURE: 98.6 F | OXYGEN SATURATION: 94 % | HEART RATE: 88 BPM | DIASTOLIC BLOOD PRESSURE: 80 MMHG | WEIGHT: 146.4 LBS | SYSTOLIC BLOOD PRESSURE: 118 MMHG | BODY MASS INDEX: 28.59 KG/M2

## 2025-06-11 DIAGNOSIS — E04.1 THYROID NODULE: Primary | ICD-10-CM

## 2025-06-11 LAB
T4 FREE SERPL-MCNC: 1.13 NG/DL (ref 0.92–1.68)
TSH SERPL DL<=0.005 MIU/L-ACNC: 2.5 UIU/ML (ref 0.27–4.2)

## 2025-06-11 NOTE — PROGRESS NOTES
Chief complaint   Chief Complaint   Patient presents with    Right thyroid nodule          Subjective     History of Present Illness:      This is a 69 years old female I am seeing for thyroid disease   referred by PCP     other medical issues   1- HLD   2- HTN   3- Osteopenia   4- Other   Pt was Dx with thyroid nodule few years ago   Is not on thyroid medication now   had blood work done by PCP in 2-2024: TSH was 2.4, t4 free was 1.26 , Tpo Was Neg   Had a thyroid Us done in 5-2025 that showed   TI-RADS category 4 right thyroid nodule has enlarged slightly within the interim and given its current size now qualifies for fine-needle aspiration. The other thyroid nodules can be followed with repeat sonography in 1 year.  pt states that she is feeling fine and had a fall 2 weeks and had a bruse around right eye and in 2024 she had a screening us on carotid that picked up a thyroid nodule and then repeated US in 2025 that showed a large nodule and is here to discuss the finding on her last thyroid US and there is no history head and Neck radiation, no History of thyroid surgery, no family history of Thyroid disease, No prior history of Thyroid Dysfunction,  and has No dysphagia, No dyspnea, No dysphonia, No change size of neck, No neck pain or discomfort, No nervousness, No shakiness, No palpitations, Weight stable   Appetite is OK, No visual changes       Latest Reference Range & Units 02/14/24 11:38   TSH Baseline 0.450 - 4.500 uIU/mL 2.460   Free T4 0.82 - 1.77 ng/dL 1.26   T3, Free 2.0 - 4.4 pg/mL 3.2   Thyroglobulin Ab 0.0 - 0.9 IU/mL <1.0   Thyroid Peroxidase Antibody 0 - 34 IU/mL <9       Family History   Problem Relation Age of Onset    Diabetes Mother         Did not require insulin or dialysis    Breast cancer Mother     Melanoma Mother     Cancer Mother         malignant melanoma; breast cancer    Lung cancer Father     Cancer Father         lung and brain cancer    Hypertension Sister     Breast cancer  Sister     Cancer Sister         breast cancer    Lung cancer Maternal Grandmother     Diabetes Maternal Grandmother     Cancer Maternal Grandmother         stomach cancer ?    Glaucoma Paternal Grandmother     Diabetes Paternal Grandmother     Vision loss Paternal Grandmother         Glaucoma    Lung cancer Maternal Grandfather     Cancer Maternal Grandfather         cancer ? brain?    Breast cancer Sister     Cancer Sister         breast cancer    Hypertension Sister     Melanoma Maternal Uncle     Cancer Maternal Uncle         skin cancer     Social History     Socioeconomic History    Marital status:      Spouse name: Stephanie   Tobacco Use    Smoking status: Never    Smokeless tobacco: Never    Tobacco comments:     never smoked   Vaping Use    Vaping status: Never Used   Substance and Sexual Activity    Alcohol use: Yes     Comment: I have  1 glass of wine or 1 beer a month.    Drug use: Never    Sexual activity: Not Currently     Partners: Male     Birth control/protection: None     Past Medical History:   Diagnosis Date    Allergic 1985    eye and nose irritations    Basal cell carcinoma of nose     Breast cancer     03/2006 Ductal carcinoma in situ T1N0M0, multifocal, R breast, s/p total R mastectomy and axillary lymphatic mapping, .    Cataract 2018    Left eye surgery 02/15/24, right eye surgery 03/04/24t eye will be done 03/04/24    Dry eye syndrome     Fracture, humerus     11/2008 R lateral humeral epicondilar Fx, s/p Sx     HLD (hyperlipidemia)     Hypercholesterolemia     Hyperglycemia     Melanoma in situ     1996, L calf melanoma, s/p removal    Nulliparity     Osteopenia     Subclinical hypothyroidism     Visual impairment 2023    Starting to see floaters on outer right side of right eye    Vitamin D deficiency      Past Surgical History:   Procedure Laterality Date    BREAST SURGERY Right     mastectomy with TRAM flap reconstruction    COLONOSCOPY      2006    COLONOSCOPY N/A  09/16/2016    Procedure: COLONOSCOPY to cecum;  Surgeon: Dale Ann Jr., MD;  Location: [facility] ENDOSCOPY;  Service:     COSMETIC SURGERY  2006    Reconstructive TRAM flap after mastectomy    FRACTURE SURGERY      HUMERUS FRACTURE SURGERY      11/2008,  for the L lateral humeral epicondilar fracture    MALIGNANT SKIN LESION EXCISION Left 1996    calf melanoma    MASTECTOMY      03/06 R ductal carcinoma in situ, multifocal,  R total mastectomy    MOLE REMOVAL      TONSILLECTOMY      VEIN LIGATION         Current Outpatient Medications:     Calcium Citrate (CITRACAL PO), Take  by mouth Daily., Disp: , Rfl:     cetirizine (zyrTEC) 10 MG tablet, Take 1 tablet by mouth Daily., Disp: , Rfl:     ezetimibe (ZETIA) 10 MG tablet, Take 1 tablet by mouth Daily., Disp: 90 tablet, Rfl: 2    fluticasone (FLONASE) 50 MCG/ACT nasal spray, INSTILL TWO SPRAYS INTO EACH NOSTRIL DAILY AS DIRECTED BY PROVIDER, Disp: 48 mL, Rfl: 3    ketoconazole (NIZORAL) 2 % cream, APPLY SPARINGLY TO AFFECTED AREA TWICE A DAY, Disp: , Rfl:     olopatadine (PATADAY) 0.2 % solution ophthalmic solution, ADMINISTER 1 DROP TO BOTH EYES DAILY., Disp: 2.5 mL, Rfl: 11    rosuvastatin (CRESTOR) 20 MG tablet, Take 1 tablet by mouth Daily., Disp: 90 tablet, Rfl: 3    tretinoin (RETIN-A) 0.025 % cream, APPLY A SMALL AMOUNT TO AFFECTED AREA EVERY EVENING, Disp: , Rfl:     vitamin D3 125 MCG (5000 UT) capsule capsule, Take 1 capsule by mouth Daily., Disp: , Rfl:   Patient has no known allergies.    Objective   Vitals:    06/11/25 0928   BP: 118/80   Pulse: 88   Temp: 98.6 °F (37 °C)   SpO2: 94%          Physical Exam  Neurological:      General: No focal deficit present.      Mental Status: She is alert and oriented to person, place, and time.               Diagnoses and all orders for this visit:    1. Thyroid nodule (Primary)  -     TSH  -     T4, Free  -     US Fine Needle Aspiration BX 1st Lesion         Assessment:     This is a 69 years old  female I am seeing for thyroid disease, referred by PCP   Is not on thyroid medication now   had blood work done by PCP in 2-2024: TSH was 2.4, t4 free was 1.26 , Tpo Was Neg   Pt was Dx with thyroid nodule few years ago and her last thyroid Us done in 5-2025 that showed  TI-RADS category 4 right thyroid nodule has enlarged slightly within the interim and given its current size now qualifies for fine-needle aspiration. The other thyroid nodules can be followed with repeat sonography in 1 year.      Plan:    1. No thyroid treatment is needed now as further tests are pending   2. Labs today :TSH, Free T4   3. Indication for treatment will be based on TSH   4. Return in 12 months   5. Was informed she has a thyroid nodule and a thyroid biopsy is needed to role out thyroid cancer and the procedure was explained to her and if no cancer will  repeat thyroid US 1 year after the last scan and if it showed cancer will send her to the surgeon   6. Labs reviewed with the Patient : T4  7- I reviewed the notes from PCP   8- Needs to go back on PCP for all of the other sx she is having     I discussed with the patient/legal representative the risks and the benefits associated with the medications.  The patient/legal representative has been given the opportunity to ask questions.  Alternatives to the proposed treatment (s) were discussed, including the likely results of no treatment.  The patient/legal representative wishes to proceed.       Seble Greenberg MD   06/11/25  09:46 EDT

## 2025-06-12 ENCOUNTER — PATIENT ROUNDING (BHMG ONLY) (OUTPATIENT)
Dept: ENDOCRINOLOGY | Age: 70
End: 2025-06-12
Payer: MEDICARE

## 2025-06-12 ENCOUNTER — OFFICE VISIT (OUTPATIENT)
Dept: OBSTETRICS AND GYNECOLOGY | Age: 70
End: 2025-06-12
Payer: MEDICARE

## 2025-06-12 VITALS
SYSTOLIC BLOOD PRESSURE: 102 MMHG | DIASTOLIC BLOOD PRESSURE: 60 MMHG | BODY MASS INDEX: 28.35 KG/M2 | HEIGHT: 60 IN | WEIGHT: 144.4 LBS

## 2025-06-12 DIAGNOSIS — Z12.4 SCREENING FOR CERVICAL CANCER: ICD-10-CM

## 2025-06-12 DIAGNOSIS — Z12.31 ENCOUNTER FOR SCREENING MAMMOGRAM FOR MALIGNANT NEOPLASM OF BREAST: Primary | ICD-10-CM

## 2025-06-12 NOTE — PROGRESS NOTES
[facility]   Obstetrics and Gynecology     2025    Patient: Chris Moreno          MR#:[id number]    History of Present Illness    Chief Complaint   Patient presents with    Gynecologic Exam     CC: new gyn. Last pap 22 (-). Last mg 24. Last dexa 24.        69 y.o. female  who presents for annual exam. She did have breast cancer in .     Relevant data reviewed:    No LMP recorded.  Obstetric History:  OB History          0    Para   0    Term   0       0    AB   0    Living   0         SAB   0    IAB   0    Ectopic   0    Molar   0    Multiple   0    Live Births   0               Menstrual History:     No LMP recorded.       Social History     Substance and Sexual Activity   Sexual Activity Not Currently    Partners: Male    Birth control/protection: None     ______________________________________  Patient Active Problem List   Diagnosis    Atopic rhinitis    Pure hypercholesterolemia    Keratoconjunctivitis sicca    Osteopenia of left hip    Varicose veins    Vitamin D deficiency    Health care maintenance    History of breast cancer    History of melanoma    Medicare annual wellness visit, subsequent    Seasonal allergies    FH: breast cancer    Pain of upper abdomen    Localized osteoporosis without current pathological fracture    Multiple thyroid nodules     Past Medical History:   Diagnosis Date    Allergic     eye and nose irritations    Basal cell carcinoma of nose     Breast cancer     2006 Ductal carcinoma in situ T1N0M0, multifocal, R breast, s/p total R mastectomy and axillary lymphatic mapping, .    Cataract     Left eye surgery 02/15/24, right eye surgery 24t eye will be done 24    Dry eye syndrome     Fracture, humerus     2008 R lateral humeral epicondilar Fx, s/p Sx Dr.Tate VILLAD (hyperlipidemia)     Hypercholesterolemia     Hyperglycemia     Melanoma in situ     , L calf melanoma, s/p removal     Nulliparity     Osteopenia     Subclinical hypothyroidism     Visual impairment 2023    Starting to see floaters on outer right side of right eye    Vitamin D deficiency      Past Surgical History:   Procedure Laterality Date    BREAST SURGERY Right     mastectomy with TRAM flap reconstruction    COLONOSCOPY      2006    COLONOSCOPY N/A 09/16/2016    Procedure: COLONOSCOPY to cecum;  Surgeon: Dale Ann Jr., MD;  Location: [facility] ENDOSCOPY;  Service:     COSMETIC SURGERY  2006    Reconstructive TRAM flap after mastectomy    FRACTURE SURGERY      HUMERUS FRACTURE SURGERY      11/2008,  for the L lateral humeral epicondilar fracture    MALIGNANT SKIN LESION EXCISION Left 1996    calf melanoma    MASTECTOMY      03/06 R ductal carcinoma in situ, multifocal,  R total mastectomy    MOLE REMOVAL      TONSILLECTOMY      VEIN LIGATION       Social History     Tobacco Use   Smoking Status Never   Smokeless Tobacco Never   Tobacco Comments    never smoked     Family History   Problem Relation Age of Onset    Diabetes Mother         Did not require insulin or dialysis    Breast cancer Mother     Melanoma Mother     Cancer Mother         malignant melanoma; breast cancer    Lung cancer Father     Cancer Father         lung and brain cancer    Hypertension Sister     Breast cancer Sister     Cancer Sister         breast cancer    Lung cancer Maternal Grandmother     Diabetes Maternal Grandmother     Cancer Maternal Grandmother         stomach cancer ?    Glaucoma Paternal Grandmother     Diabetes Paternal Grandmother     Vision loss Paternal Grandmother         Glaucoma    Lung cancer Maternal Grandfather     Cancer Maternal Grandfather         cancer ? brain?    Breast cancer Sister     Cancer Sister         breast cancer    Hypertension Sister     Melanoma Maternal Uncle     Cancer Maternal Uncle         skin cancer     Prior to Admission medications    Medication Sig Start Date End Date Taking? Authorizing  Provider   Calcium Citrate (CITRACAL PO) Take  by mouth Daily.    García Rodriguez MD   cetirizine (zyrTEC) 10 MG tablet Take 1 tablet by mouth Daily. 8/1/19   García Rodriguez MD   ezetimibe (ZETIA) 10 MG tablet Take 1 tablet by mouth Daily. 11/18/24   Alecia Mckeon MD   fluticasone (FLONASE) 50 MCG/ACT nasal spray INSTILL TWO SPRAYS INTO EACH NOSTRIL DAILY AS DIRECTED BY PROVIDER 10/1/24   Alecia Mckeon MD   ketoconazole (NIZORAL) 2 % cream APPLY SPARINGLY TO AFFECTED AREA TWICE A DAY 3/19/25   García Rodriguez MD   olopatadine (PATADAY) 0.2 % solution ophthalmic solution ADMINISTER 1 DROP TO BOTH EYES DAILY. 1/13/20   Belkis Rangel MD   rosuvastatin (CRESTOR) 20 MG tablet Take 1 tablet by mouth Daily. 3/17/25   Alecia Mckeon MD   tretinoin (RETIN-A) 0.025 % cream APPLY A SMALL AMOUNT TO AFFECTED AREA EVERY EVENING 9/23/21   García Rodriguez MD   vitamin D3 125 MCG (5000 UT) capsule capsule Take 1 capsule by mouth Daily.    García Rodriguez MD     _______________________________________    Current contraception: post menopausal status  History of abnormal Pap smear: no  Family history of uterine or ovarian cancer: no  Family History of colon cancer/colon polyps: no  History of abnormal mammogram: yes - had personal history of breast cancer   History of abnormal lipids: yes - on a statin     The following portions of the patient's history were reviewed and updated as appropriate: allergies, current medications, past family history, past medical history, past social history, past surgical history, and problem list.        Pertinent items are noted in HPI.       Objective   Physical Exam  Constitutional:       Appearance: Normal appearance.   Genitourinary:      Bladder and urethral meatus normal.      No lesions in the vagina.      Genitourinary Comments: S/p reconstruction on R breast       Right Labia: No lesions or skin changes.     Left Labia: No lesions or skin changes.      "No labial fusion noted.      No vaginal bleeding.      No vaginal prolapse present.     Moderate vaginal atrophy present.       Right Adnexa: not tender and no mass present.     Left Adnexa: not tender and no mass present.     No cervical discharge or lesion.      Uterus is not enlarged or tender.      No urethral tenderness or mass present.   Rectum:      No external hemorrhoid.   Breasts:     Right: Normal. Breast implant present.      Left: Normal.   HENT:      Head: Normocephalic and atraumatic.   Cardiovascular:      Rate and Rhythm: Normal rate.   Pulmonary:      Effort: Pulmonary effort is normal.   Abdominal:      General: Abdomen is flat.      Palpations: Abdomen is soft.   Lymphadenopathy:      Upper Body:      Right upper body: No supraclavicular or axillary adenopathy.      Left upper body: No supraclavicular or axillary adenopathy.   Neurological:      Mental Status: She is alert and oriented to person, place, and time.   Skin:     General: Skin is warm and dry.   Psychiatric:         Mood and Affect: Mood normal.   Nursing note reviewed. Exam conducted with a chaperone present.          /60   Ht 152 cm (59.84\")   Wt 65.5 kg (144 lb 6.4 oz)   BMI 28.35 kg/m²    BP Readings from Last 3 Encounters:   06/12/25 102/60   06/11/25 118/80   06/04/25 126/74      Wt Readings from Last 3 Encounters:   06/12/25 65.5 kg (144 lb 6.4 oz)   06/11/25 66.4 kg (146 lb 6.4 oz)   06/04/25 66 kg (145 lb 9.6 oz)        BMI: Estimated body mass index is 28.35 kg/m² as calculated from the following:    Height as of this encounter: 152 cm (59.84\").    Weight as of this encounter: 65.5 kg (144 lb 6.4 oz).    As part of wellness and prevention, the following topics were discussed with the patient:  Encouraged self breast exam  Sexual transmitted disease prevention   Dental health discussed  Vaccinations/immunizations addressed.                  Problem List   Meds  History  Prep for Surg   " Imagin}    Assessment:  69 y.o. female  who presents for annual exam.  Diagnoses and all orders for this visit:    1. Encounter for screening mammogram for malignant neoplasm of breast (Primary)  -     Mammo screening digital tomosynthesis bilateral w CAD    2. Screening for cervical cancer  -     IGP, Apt HPV,rfx 16 / 18,45    - Pap collected today  - Mammo/Colonoscopy up to date  - Vaccine recs reviewed  - STI screening declined       Plan:  No follow-ups on file.    Radha Ramirez MD  2025 12:48 EDT

## 2025-06-14 LAB
CYTOLOGIST CVX/VAG CYTO: NORMAL
CYTOLOGY CVX/VAG DOC CYTO: NORMAL
CYTOLOGY CVX/VAG DOC THIN PREP: NORMAL
DX ICD CODE: NORMAL
HPV I/H RISK 4 DNA CVX QL PROBE+SIG AMP: NEGATIVE
OTHER STN SPEC: NORMAL
SERVICE CMNT-IMP: NORMAL
STAT OF ADQ CVX/VAG CYTO-IMP: NORMAL

## 2025-07-11 ENCOUNTER — HOSPITAL ENCOUNTER (OUTPATIENT)
Dept: ULTRASOUND IMAGING | Facility: HOSPITAL | Age: 70
Discharge: HOME | End: 2025-07-11
Payer: MEDICARE

## 2025-07-11 VITALS
OXYGEN SATURATION: 98 % | RESPIRATION RATE: 16 BRPM | DIASTOLIC BLOOD PRESSURE: 90 MMHG | HEART RATE: 65 BPM | BODY MASS INDEX: 25.27 KG/M2 | HEIGHT: 64 IN | TEMPERATURE: 97.5 F | WEIGHT: 148 LBS | SYSTOLIC BLOOD PRESSURE: 130 MMHG

## 2025-07-11 PROCEDURE — 25010000002 LIDOCAINE 1 % SOLUTION: Performed by: NURSE PRACTITIONER

## 2025-07-11 PROCEDURE — 88173 CYTOPATH EVAL FNA REPORT: CPT | Performed by: INTERNAL MEDICINE

## 2025-07-11 PROCEDURE — 88305 TISSUE EXAM BY PATHOLOGIST: CPT | Performed by: INTERNAL MEDICINE

## 2025-07-11 RX ORDER — SODIUM CHLORIDE 0.9 % (FLUSH) 0.9 %
10 SYRINGE (ML) INJECTION AS NEEDED
Status: DISCONTINUED | OUTPATIENT
Start: 2025-07-11 | End: 2025-07-12 | Stop reason: HOSPADM

## 2025-07-11 RX ORDER — SENNOSIDES 8.6 MG
650 CAPSULE ORAL EVERY 8 HOURS PRN
COMMUNITY

## 2025-07-11 RX ORDER — LIDOCAINE HYDROCHLORIDE 10 MG/ML
10 INJECTION, SOLUTION INFILTRATION; PERINEURAL ONCE
Status: COMPLETED | OUTPATIENT
Start: 2025-07-11 | End: 2025-07-11

## 2025-07-11 RX ADMIN — LIDOCAINE HYDROCHLORIDE 4 ML: 10 INJECTION, SOLUTION INFILTRATION; PERINEURAL at 14:49

## 2025-07-11 NOTE — NURSING NOTE
Pt BP remains elevated. Pt asymptomatic and will keep an eye on it. Pt denies any issues.  Ambulated self to main entrance. NAD noted.

## 2025-07-11 NOTE — DISCHARGE INSTRUCTIONS
EDUCATION / DISCHARGE INSTRUCTIONS  Aspiration:  An aspiration is a procedure used to take a sample of cells or tissue from a lump, mass or other area of concern.   Within a week the radiologist will send a report to your physician.  A pathologist will also examine the tissue and send a report.  THIS EDUCATION INFORMATION WAS REVIEWED PRIOR TO THE PROCEDURE AND CONSENT.        Post Procedure:    *  Rest today (no pushing pulling or straining).   *  Slowly increase activity tomorow.    *  If you received sedation do not drive for 24 hours.   *  Keep dressing clean and dry.   *  Leave dressing on puncture site for 24 hours.    *  You may shower when dressing removed.   *  If needed, use an ice pack at the site for up to 20 minutes at a time for pain.    Call your doctor if experiencing:   *  Signs of infection such as redness, swelling, excessive pain and / or foul smelling drainage from the puncture site.   *  Chills or fever over 101 degrees (by mouth).   *  Unrelieved pain.   *  Any new or severe symptoms.      Following the procedure:     Follow-up with the ordering physician as directed.    Continue to take other medications as directed by your physician unless otherwise instructed.        If you have any concerns please call the Radiology Nurses Desk at [phone].  You are the most important factor in your recovery.  Follow the above instructions carefully.

## 2025-07-14 LAB
CYTO UR: NORMAL
LAB AP CASE REPORT: NORMAL
LAB AP NON-GYN SPECIMEN ADEQUACY: NORMAL
PATH REPORT.FINAL DX SPEC: NORMAL
PATH REPORT.GROSS SPEC: NORMAL

## 2025-07-30 ENCOUNTER — TELEPHONE (OUTPATIENT)
Dept: OBSTETRICS AND GYNECOLOGY | Age: 70
End: 2025-07-30

## 2025-07-30 DIAGNOSIS — Z12.31 ENCOUNTER FOR SCREENING MAMMOGRAM FOR MALIGNANT NEOPLASM OF BREAST: Primary | ICD-10-CM

## 2025-08-22 DIAGNOSIS — E78.00 PURE HYPERCHOLESTEROLEMIA: ICD-10-CM

## 2025-08-22 RX ORDER — EZETIMIBE 10 MG/1
10 TABLET ORAL DAILY
Qty: 90 TABLET | Refills: 2 | Status: SHIPPED | OUTPATIENT
Start: 2025-08-22